# Patient Record
Sex: FEMALE | Race: WHITE | NOT HISPANIC OR LATINO | Employment: FULL TIME | URBAN - METROPOLITAN AREA
[De-identification: names, ages, dates, MRNs, and addresses within clinical notes are randomized per-mention and may not be internally consistent; named-entity substitution may affect disease eponyms.]

---

## 2017-01-20 ASSESSMENT — ENCOUNTER SYMPTOMS
ARTHRALGIAS: 0
JOINT SWELLING: 0
NECK PAIN: 0
STIFFNESS: 0
MUSCLE CRAMPS: 0
BACK PAIN: 0
MYALGIAS: 0
MUSCLE WEAKNESS: 1

## 2017-01-23 ENCOUNTER — ONCOLOGY VISIT (OUTPATIENT)
Dept: ONCOLOGY | Facility: CLINIC | Age: 62
End: 2017-01-23
Attending: INTERNAL MEDICINE
Payer: COMMERCIAL

## 2017-01-23 VITALS
DIASTOLIC BLOOD PRESSURE: 91 MMHG | HEART RATE: 80 BPM | RESPIRATION RATE: 18 BRPM | BODY MASS INDEX: 26.98 KG/M2 | OXYGEN SATURATION: 99 % | TEMPERATURE: 97.3 F | WEIGHT: 150 LBS | SYSTOLIC BLOOD PRESSURE: 160 MMHG

## 2017-01-23 DIAGNOSIS — C50.812 MALIGNANT NEOPLASM OF OVERLAPPING SITES OF LEFT FEMALE BREAST (H): Primary | ICD-10-CM

## 2017-01-23 PROCEDURE — 99212 OFFICE O/P EST SF 10 MIN: CPT

## 2017-01-23 PROCEDURE — 99214 OFFICE O/P EST MOD 30 MIN: CPT | Mod: ZP | Performed by: INTERNAL MEDICINE

## 2017-01-23 ASSESSMENT — PAIN SCALES - GENERAL: PAINLEVEL: NO PAIN (0)

## 2017-01-23 NOTE — NURSING NOTE
"Esther Vegas is a 61 year old female who presents for:  Chief Complaint   Patient presents with     Oncology Clinic Visit     Breast Cancer        Initial Vitals:  /91 mmHg  Pulse 80  Temp(Src) 97.3  F (36.3  C) (Oral)  Resp 18  Wt 68.04 kg (150 lb)  SpO2 99%  LMP 01/25/2007 Estimated body mass index is 26.98 kg/(m^2) as calculated from the following:    Height as of 11/16/16: 1.588 m (5' 2.52\").    Weight as of this encounter: 68.04 kg (150 lb).. There is no height on file to calculate BSA. BP completed using cuff size: regular  No Pain (0) Patient's last menstrual period was 01/25/2007. Allergies and medications reviewed.     Medications: Medication refills not needed today.  Pharmacy name entered into [facility]: [id number] [place] [address]    Comments: Patient is not having any pain today.     6 minutes for nursing intake (face to face time)   Joanna Cabral CMA         "

## 2017-01-23 NOTE — Clinical Note
1/23/2017       RE: Esther Vegas  [address] [facility] [address]     Dear Colleague,    Thank you for referring your patient, Esther Vegas, to the [facility]. Please see a copy of my visit note below.    MEDICAL ONCOLOGY FOLLOW-UP PATIENT VISIT     NAME: Esther Vegas     DATE: 1/23/2017    PRIMARY CARE PHYSICIAN: Yana Diaz    PATIENT ID: Grade I, stage IIb, T3N0M0, ER positive, MD positive, HER2 non-amplified invasive lobular carcinoma of the left breast. Oncotype DX recurrence score = 11.     Oncology History: Ms. Vegas is a 61 year old female with invasive lobular cancer of the left breast. Ms. Vegas had an abnormal routine screening mammogram performed on 10/23/15. The mammogram demonstrated a spiculated lesion in the upper outer left breast. Previous mammogram was 2.5 years prior. Ultrasound showed a 0.6 cm mass with indistinct margins at the 1:30 position, 3 cm from the nipple and a second 0.6 cm mass at the 1:00 position, 5 cm from the nipple. Contrast enhanced mammogram then showed non-masslike clumped enhancement in a segmental distribution from the 1 to 3:00 position of the left breast measuring 7.5 cm. Biopsy of the mass at the 1:00 position showed a grade 1 invasive lobular carcinoma and classical type LCIS. Estrogen receptor staining was strongly positive in >95% of tumor cell nuclei. Progesterone receptor staining was moderately positive in 60% of tumor cell nuclei. HER2 was nonamplified by FISH with a HER2 to SAVAGE-17 ratio of 1.2.     12/21/15 patient underwent left axillary sentinel lymph node biopsy and left nipple-sparing mastectomy with immediate reconstruction. Pathology showed: Invasive lobular carcinoma, Austin grade 2, involving the upper outer quadrant, central breast and lower inner quadrant, size 5.4 cm. There was LCIS, classical type. The margins were negative (after further resection of the positive anterior margins). [place] LN  was negative (only one was resected). [id number] (stage IIB). Tumor was sent for oncotype dx testing which returned with a recurrence score of 11. A recurrence score of 11 correlates with an 8% risk of distant recurrence in 10 years after treatment with tamoxifen alone.  Adjuvant postmastectomy chest wall radiation was not recommended based on lack of high risk features.  She has been on adjuvant letrozole since 1/13/16.    Interim History:   Esther comes in to clinic today for routine 3-month breast cancer followup.  She continues on treatment with letrozole and has now been on the medication for 1 year.  She is currently without concerns or complaints.  She specifically denies concerning lumps or masses of either breast.  She has no breast pain or discomfort.  The one thing that she has noticed, since our last visit, is that she used to require readers for close vision and no longer has to do so.  She has specifically noticed improvement in the vision of her right eye.  Also since our last visit, she has developed further numbness of the first, second and third digits of the right hand.  She is scheduled to see a hand surgeon for treatment of carpal tunnel in the near future.  She denies new bone or joint aches or pains.  She has no cough, shortness of breath or chest pain.  She denies abdominal complaints including abdominal pain, nausea, vomiting, diarrhea or constipation.  She has not noted any swelling of her lower extremities.  She denies headaches, visual changes or focal neurologic complaints.  She has no symptoms of depression or anxiety.  She reports her weight is stable.  She is continuing to exercise on a regular basis.  She and her  were previously planning on going somewhere warm in February; however, her  is [place] and instead has decided to lump his vacation into the month of April in order to maintain benefits through his time of alf.  The remainder of a 10-point review of  systems is otherwise negative.         REVIEW OF SYSTEMS: Full 10-point review of systems was performed. Pertinent symptoms are reviewed above per HPI.    PAST MEDICAL HISTORY:   Past Medical History   Diagnosis Date     Variants of migraine, not elsewhere classified, without mention of intractable migraine without mention of status migrainosus      menstrual migraines     Other malignant neoplasm of skin, site unspecified      Breast cancer (H) 2015     left, invasive lobular     GERD (gastroesophageal reflux disease)      Tinnitus      BCC (basal cell carcinoma of skin)        PAST SURGICAL HISTORY:  Past Surgical History   Procedure Laterality Date     Hemorrhoidectomy  5/2006     Colonoscopy  2006     Mastectomy, reconstruct breast, combined Left 12/21/2015     Procedure: COMBINED MASTECTOMY, RECONSTRUCT BREAST;  Surgeon: Francisco Gomez MD;  Location: [facility]     Biopsy node sentinel Left 12/21/2015     Procedure: BIOPSY NODE SENTINEL;  Surgeon: Francisco Gomez MD;  Location: [facility] OR     Reconstruct breast Left 12/21/2015     Procedure: RECONSTRUCT BREAST;  Surgeon: Francisco Gomez MD;  Location: [facility]     Remove and replace breast implant prosthesis Left 2/15/2016     Procedure: REMOVE AND REPLACE BREAST IMPLANT PROSTHESIS;  Surgeon: RADHA Friedman MD;  Location: [facility] OR     Mastopexy Right 2/15/2016     Procedure: MASTOPEXY;  Surgeon: RADHA Friedman MD;  Location: [facility] OR       MEDS:  Current Outpatient Prescriptions   Medication     letrozole (FEMARA) 2.5 MG tablet     Vaginal Lubricant (REPLENS) GEL     calcium-vitamin D 500-125 MG-UNIT TABS     Ibuprofen (ADVIL PO)     cetirizine (ZYRTEC) 10 MG tablet     No current facility-administered medications for this visit.       ALLERGIES:  Allergies   Allergen Reactions     Bactrim [Sulfamethoxazole W/Trimethoprim]      Facial redness     Codeine      Dizziness and nausea     Vicodin [Hydrocodone-Acetaminophen] Nausea        PHYSICAL EXAM:  /91 mmHg   Pulse 80  Temp(Src) 97.3  F (36.3  C) (Oral)  Resp 18  Wt 68.04 kg (150 lb)  SpO2 99%  LMP 01/25/2007  LMP 01/25/2007  KPS: 100%  GENERAL: A&Ox3, well nourished, well appearing adult female in no acute distress  HEENT: Normocephalic, atraumatic, PERRLA, oropharynx clear  LYMPH: No palpable cervical, supraclavicular, or axillary lymphadenopathy  CV: RRR, normal S1/S2, No murmurs, gallops, or rubs  BREAST:  Left breast mastectomy with reconstruction.  There are no palpable masses of either the right breast or overlying the left reconstruction.  There are no discretely palpable masses of the right breast.  Right nipple is everted.  No palpable axillary lymphadenopathy.  LUNGS: CTA B/L, no wheezing or rhonchi  ABDOMEN: soft, nontender, non-distended, no hepatosplenomegaly. Bowel sounds present.  EXTREMITIES: no lower extremity edema  NEURO: Cranial nerves II-XII grossly intact  INTEGUMENT: No rashes    LABS REVIEWED THIS VISIT:  1/18/16 DEXA Bone Density Scan:  Conclusions:    The most negative and valid T-score of -0.3 at the level of the right femoral neck,  corresponds with normal bone density.      IMPRESSION/PLAN: 61 yo female with stage IIb, T3N0M0, grade I, ER positive, WI positive, HER2 negative invasive lobular carcinoma of the left breast. She is s/p treatment with left breast mastectomy. Oncotype dx recurrence score was 11 which is in the low risk of recurrence category. Given lack of high risk features (low grade, no positive margins or evidence of LVI), adjuvant radiation was not recommended. She has been on adjuvant letrozole since 01/13/2016.    1.  Left breast estrogen receptor-positive cancer:  Ms. Vegas is approximately 1 year and 1 month out from excision of her left-sided breast cancer.  She has been on adjuvant letrozole for 1 year.  She is tolerating treatment remarkably well.  We are planning on treating her for a minimum of 5 years and will discuss the risks and benefits of extended  endocrine therapy at the 5 year point.  There is no evidence of disease recurrence on my clinical breast exam performed today.  We will continue routine surveillance visits.  I will see her back in clinic in 3 months.  She will next be due for annual mammogram in 10/2017.      We again reviewed lifestyle changes which have been suggested to prevent breast cancer recurrence including 30 minutes of daily  cardiovascular exercise, maintaining a normal BMI, a diet low in saturated fat and refined sugar, a daily baby aspirin, vitamin D supplementation, and reduced alcohol intake.  Ms. Vegas confirms that she is currently adhering to all of these recommendations.      2.  Bone health:  DEXA bone density scan in 01/2016 showed normal bone density.  She is at increased risk of osteoporosis on letrozole therapy.  She will continue to take calcium 1200 mg and vitamin D 1000 international units daily as well as obtain 30 minutes of daily weightbearing exercise.      3.  Visual changes:  Letrozole is associated with cataracts.   Esther will follow up with her ophthalmologist regarding her recent visual changes.       4.  Carpal tunnel syndrome:  Scheduled to see a hand surgeon next week.     5.  Followup:  Return to clinic in 3 months for routine breast cancer followup with me.       It was a pleasure to see Ms. Vegas in clinic today.  A total of 20 minutes of our 25-minute face-to-face visit was spent counseling the patient.     Again, thank you for allowing me to participate in the care of your patient.      Sincerely,    Genevieve Rojas MD

## 2017-01-23 NOTE — PROGRESS NOTES
MEDICAL ONCOLOGY FOLLOW-UP PATIENT VISIT     NAME: Esther Vegas     DATE: 1/23/2017    PRIMARY CARE PHYSICIAN: Yana Diaz    PATIENT ID: Grade I, stage IIb, T3N0M0, ER positive, WI positive, HER2 non-amplified invasive lobular carcinoma of the left breast. Oncotype DX recurrence score = 11.     Oncology History: Ms. Vegas is a 61 year old female with invasive lobular cancer of the left breast. Ms. Vegas had an abnormal routine screening mammogram performed on 10/23/15. The mammogram demonstrated a spiculated lesion in the upper outer left breast. Previous mammogram was 2.5 years prior. Ultrasound showed a 0.6 cm mass with indistinct margins at the 1:30 position, 3 cm from the nipple and a second 0.6 cm mass at the 1:00 position, 5 cm from the nipple. Contrast enhanced mammogram then showed non-masslike clumped enhancement in a segmental distribution from the 1 to 3:00 position of the left breast measuring 7.5 cm. Biopsy of the mass at the 1:00 position showed a grade 1 invasive lobular carcinoma and classical type LCIS. Estrogen receptor staining was strongly positive in >95% of tumor cell nuclei. Progesterone receptor staining was moderately positive in 60% of tumor cell nuclei. HER2 was nonamplified by FISH with a HER2 to SAVAGE-17 ratio of 1.2.     12/21/15 patient underwent left axillary sentinel lymph node biopsy and left nipple-sparing mastectomy with immediate reconstruction. Pathology showed: Invasive lobular carcinoma, Damari grade 2, involving the upper outer quadrant, central breast and lower inner quadrant, size 5.4 cm. There was LCIS, classical type. The margins were negative (after further resection of the positive anterior margins). [address] LN was negative (only one was resected). [id number] (stage IIB). Tumor was sent for oncotype dx testing which returned with a recurrence score of 11. A recurrence score of 11 correlates with an 8% risk of distant recurrence in 10 years after  treatment with tamoxifen alone.  Adjuvant postmastectomy chest wall radiation was not recommended based on lack of high risk features.  She has been on adjuvant letrozole since 1/13/16.    Interim History:   Esther comes in to clinic today for routine 3-month breast cancer followup.  She continues on treatment with letrozole and has now been on the medication for 1 year.  She is currently without concerns or complaints.  She specifically denies concerning lumps or masses of either breast.  She has no breast pain or discomfort.  The one thing that she has noticed, since our last visit, is that she used to require readers for close vision and no longer has to do so.  She has specifically noticed improvement in the vision of her right eye.  Also since our last visit, she has developed further numbness of the first, second and third digits of the right hand.  She is scheduled to see a hand surgeon for treatment of carpal tunnel in the near future.  She denies new bone or joint aches or pains.  She has no cough, shortness of breath or chest pain.  She denies abdominal complaints including abdominal pain, nausea, vomiting, diarrhea or constipation.  She has not noted any swelling of her lower extremities.  She denies headaches, visual changes or focal neurologic complaints.  She has no symptoms of depression or anxiety.  She reports her weight is stable.  She is continuing to exercise on a regular basis.  She and her  were previously planning on going somewhere warm in February; however, her  is near [place] and instead has decided to lump his vacation into the month of April in order to maintain benefits through his time of [employer].  The remainder of a 10-point review of systems is otherwise negative.         REVIEW OF SYSTEMS: Full 10-point review of systems was performed. Pertinent symptoms are reviewed above per HPI.    PAST MEDICAL HISTORY:   Past Medical History   Diagnosis Date     Variants of  migraine, not elsewhere classified, without mention of intractable migraine without mention of status migrainosus      menstrual migraines     Other malignant neoplasm of skin, site unspecified      Breast cancer (H) 2015     left, invasive lobular     GERD (gastroesophageal reflux disease)      Tinnitus      BCC (basal cell carcinoma of skin)        PAST SURGICAL HISTORY:  Past Surgical History   Procedure Laterality Date     Hemorrhoidectomy  5/2006     Colonoscopy  2006     Mastectomy, reconstruct breast, combined Left 12/21/2015     Procedure: COMBINED MASTECTOMY, RECONSTRUCT BREAST;  Surgeon: Francisco Gomez MD;  Location: [facility]     Biopsy node sentinel Left 12/21/2015     Procedure: BIOPSY NODE SENTINEL;  Surgeon: Francisco Gomez MD;  Location: [facility] OR     Reconstruct breast Left 12/21/2015     Procedure: RECONSTRUCT BREAST;  Surgeon: Francisco Gomez MD;  Location: [facility]     Remove and replace breast implant prosthesis Left 2/15/2016     Procedure: REMOVE AND REPLACE BREAST IMPLANT PROSTHESIS;  Surgeon: RADHA Friedman MD;  Location: [facility]     Mastopexy Right 2/15/2016     Procedure: MASTOPEXY;  Surgeon: RADHA Friedman MD;  Location: [facility] OR       MEDS:  Current Outpatient Prescriptions   Medication     letrozole (FEMARA) 2.5 MG tablet     Vaginal Lubricant (REPLENS) GEL     calcium-vitamin D 500-125 MG-UNIT TABS     Ibuprofen (ADVIL PO)     cetirizine (ZYRTEC) 10 MG tablet     No current facility-administered medications for this visit.       ALLERGIES:  Allergies   Allergen Reactions     Bactrim [Sulfamethoxazole W/Trimethoprim]      Facial redness     Codeine      Dizziness and nausea     Vicodin [Hydrocodone-Acetaminophen] Nausea        PHYSICAL EXAM:  /91 mmHg  Pulse 80  Temp(Src) 97.3  F (36.3  C) (Oral)  Resp 18  Wt 68.04 kg (150 lb)  SpO2 99%  LMP 01/25/2007  LMP 01/25/2007  KPS: 100%  GENERAL: A&Ox3, well nourished, well appearing adult female in no acute distress  HEENT:  Normocephalic, atraumatic, PERRLA, oropharynx clear  LYMPH: No palpable cervical, supraclavicular, or axillary lymphadenopathy  CV: RRR, normal S1/S2, No murmurs, gallops, or rubs  BREAST:  Left breast mastectomy with reconstruction.  There are no palpable masses of either the right breast or overlying the left reconstruction.  There are no discretely palpable masses of the right breast.  Right nipple is everted.  No palpable axillary lymphadenopathy.  LUNGS: CTA B/L, no wheezing or rhonchi  ABDOMEN: soft, nontender, non-distended, no hepatosplenomegaly. Bowel sounds present.  EXTREMITIES: no lower extremity edema  NEURO: Cranial nerves II-XII grossly intact  INTEGUMENT: No rashes    LABS REVIEWED THIS VISIT:  1/18/16 DEXA Bone Density Scan:  Conclusions:    The most negative and valid T-score of -0.3 at the level of the right femoral neck,  corresponds with normal bone density.      IMPRESSION/PLAN: 61 yo female with stage IIb, T3N0M0, grade I, ER positive, VT positive, HER2 negative invasive lobular carcinoma of the left breast. She is s/p treatment with left breast mastectomy. Oncotype dx recurrence score was 11 which is in the low risk of recurrence category. Given lack of high risk features (low grade, no positive margins or evidence of LVI), adjuvant radiation was not recommended. She has been on adjuvant letrozole since 01/13/2016.    1.  Left breast estrogen receptor-positive cancer:  Ms. Vegas is approximately 1 year and 1 month out from excision of her left-sided breast cancer.  She has been on adjuvant letrozole for 1 year.  She is tolerating treatment remarkably well.  We are planning on treating her for a minimum of 5 years and will discuss the risks and benefits of extended endocrine therapy at the 5 year point.  There is no evidence of disease recurrence on my clinical breast exam performed today.  We will continue routine surveillance visits.  I will see her back in clinic in 3 months.  She will  next be due for annual mammogram in 10/2017.      We again reviewed lifestyle changes which have been suggested to prevent breast cancer recurrence including 30 minutes of daily  cardiovascular exercise, maintaining a normal BMI, a diet low in saturated fat and refined sugar, a daily baby aspirin, vitamin D supplementation, and reduced alcohol intake.  Ms. Vegas confirms that she is currently adhering to all of these recommendations.      2.  Bone health:  DEXA bone density scan in 01/2016 showed normal bone density.  She is at increased risk of osteoporosis on letrozole therapy.  She will continue to take calcium 1200 mg and vitamin D 1000 international units daily as well as obtain 30 minutes of daily weightbearing exercise.      3.  Visual changes:  Letrozole is associated with cataracts.   Esther will follow up with her ophthalmologist regarding her recent visual changes.       4.  Carpal tunnel syndrome:  Scheduled to see a hand surgeon next week.     5.  Followup:  Return to clinic in 3 months for routine breast cancer followup with me.       It was a pleasure to see Ms. Vegas in clinic today.  A total of 20 minutes of our 25-minute face-to-face visit was spent counseling the patient.

## 2017-04-24 ENCOUNTER — ONCOLOGY VISIT (OUTPATIENT)
Dept: ONCOLOGY | Facility: CLINIC | Age: 62
End: 2017-04-24
Attending: INTERNAL MEDICINE
Payer: COMMERCIAL

## 2017-04-24 VITALS
TEMPERATURE: 98.2 F | OXYGEN SATURATION: 96 % | RESPIRATION RATE: 12 BRPM | SYSTOLIC BLOOD PRESSURE: 135 MMHG | HEART RATE: 90 BPM | HEIGHT: 63 IN | DIASTOLIC BLOOD PRESSURE: 89 MMHG | WEIGHT: 149.7 LBS | BODY MASS INDEX: 26.52 KG/M2

## 2017-04-24 DIAGNOSIS — C50.812 MALIGNANT NEOPLASM OF OVERLAPPING SITES OF LEFT FEMALE BREAST (H): Primary | ICD-10-CM

## 2017-04-24 PROCEDURE — 99214 OFFICE O/P EST MOD 30 MIN: CPT | Mod: ZP | Performed by: INTERNAL MEDICINE

## 2017-04-24 PROCEDURE — 99212 OFFICE O/P EST SF 10 MIN: CPT | Mod: ZF

## 2017-04-24 ASSESSMENT — PAIN SCALES - GENERAL: PAINLEVEL: NO PAIN (0)

## 2017-04-24 NOTE — PROGRESS NOTES
MEDICAL ONCOLOGY FOLLOW-UP PATIENT VISIT     NAME: Esther Vegas     DATE: 4/24/2017    PRIMARY CARE PHYSICIAN: Yana Diaz    PATIENT ID: Grade I, stage IIb, T3N0M0, ER positive, OR positive, HER2 non-amplified invasive lobular carcinoma of the left breast. Oncotype DX recurrence score = 11.     Oncology History: Ms. Vegas is a 61 year old female with invasive lobular cancer of the left breast. Ms. Vegas had an abnormal routine screening mammogram performed on 10/23/15. The mammogram demonstrated a spiculated lesion in the upper outer left breast. Previous mammogram was 2.5 years prior. Ultrasound showed a 0.6 cm mass with indistinct margins at the 1:30 position, 3 cm from the nipple and a second 0.6 cm mass at the 1:00 position, 5 cm from the nipple. Contrast enhanced mammogram then showed non-masslike clumped enhancement in a segmental distribution from the 1 to 3:00 position of the left breast measuring 7.5 cm. Biopsy of the mass at the 1:00 position showed a grade 1 invasive lobular carcinoma and classical type LCIS. Estrogen receptor staining was strongly positive in >95% of tumor cell nuclei. Progesterone receptor staining was moderately positive in 60% of tumor cell nuclei. HER2 was nonamplified by FISH with a HER2 to SAVAGE-17 ratio of 1.2.     12/21/15 patient underwent left axillary sentinel lymph node biopsy and left nipple-sparing mastectomy with immediate reconstruction. Pathology showed: Invasive lobular carcinoma, [place] grade 2, involving the upper outer quadrant, central breast and lower inner quadrant, size 5.4 cm. There was LCIS, classical type. The margins were negative (after further resection of the positive anterior margins). [place] LN was negative (only one was resected). [id number] (stage IIB). Tumor was sent for oncotype dx testing which returned with a recurrence score of 11. A recurrence score of 11 correlates with an 8% risk of distant recurrence in 10 years after  treatment with tamoxifen alone.  Adjuvant postmastectomy chest wall radiation was not recommended based on lack of high risk features.  She has been on adjuvant letrozole since 1/13/16.    Interim History:   Esther Eagle comes in to clinic today for routine 3-month breast cancer followup.  She continues on treatment with letrozole and has now been on the medication for 1 year and 3 months.  She denies significant side effects.  She specifically denies joint pains or vaginal dryness.  She continues to have hot flashes.  They seem to occur in a cycle.  She has hot flashes approximately every 3-4 weeks.  She has 2-3 episodes a day in the time periods that she is having them.  They are quite bothersome, characterized by both flushing and sweating; however, they pass relatively quickly.  She denies that they are interfering with her daily activities.  She denies that they are interfering with sleep.  Since our last visit, she had carpal tunnel release surgery on her right wrist on 03/31.  She is glad that she had the surgery.  She is getting some feeling back in the fingers of that hand.  She denies new bone or joint aches or pains.  She has no cough, shortness of breath or chest pain.  She denies abdominal complaints including abdominal pain, nausea, vomiting, diarrhea or constipation.  She has no headaches, visual changes or focal neurologic complaints.  She has seen her ophthalmologist for visual blurriness and was prescribed a new pair of eye glasses.  The remainder of a 10-point review of systems is otherwise negative.  Of note, she showed me some pictures today of her 's trip to [facility].  He just returned yesterday.       REVIEW OF SYSTEMS: Full 10-point review of systems was performed. Pertinent symptoms are reviewed above per HPI.    PAST MEDICAL HISTORY:   Past Medical History:   Diagnosis Date     BCC (basal cell carcinoma of skin)      Breast cancer (H) 2015    left, invasive lobular     GERD  "(gastroesophageal reflux disease)      Other malignant neoplasm of skin, site unspecified      Tinnitus      Variants of migraine, not elsewhere classified, without mention of intractable migraine without mention of status migrainosus     menstrual migraines       PAST SURGICAL HISTORY:  Past Surgical History:   Procedure Laterality Date     BIOPSY NODE SENTINEL Left 12/21/2015    Procedure: BIOPSY NODE SENTINEL;  Surgeon: Francisco Gomez MD;  Location: [facility]     COLONOSCOPY  2006     HEMORRHOIDECTOMY  5/2006     MASTECTOMY, RECONSTRUCT BREAST, COMBINED Left 12/21/2015    Procedure: COMBINED MASTECTOMY, RECONSTRUCT BREAST;  Surgeon: Francisco Gomez MD;  Location: [facility] OR     MASTOPEXY Right 2/15/2016    Procedure: MASTOPEXY;  Surgeon: RADHA Friedman MD;  Location: [facility]     RECONSTRUCT BREAST Left 12/21/2015    Procedure: RECONSTRUCT BREAST;  Surgeon: Francisco Gomez MD;  Location: [facility]     REMOVE AND REPLACE BREAST IMPLANT PROSTHESIS Left 2/15/2016    Procedure: REMOVE AND REPLACE BREAST IMPLANT PROSTHESIS;  Surgeon: RADHA Friedman MD;  Location: [facility]       MEDS:  Current Outpatient Prescriptions   Medication     letrozole (FEMARA) 2.5 MG tablet     calcium-vitamin D 500-125 MG-UNIT TABS     Ibuprofen (ADVIL PO)     cetirizine (ZYRTEC) 10 MG tablet     Vaginal Lubricant (REPLENS) GEL     No current facility-administered medications for this visit.        ALLERGIES:  Allergies   Allergen Reactions     Bactrim [Sulfamethoxazole W/Trimethoprim]      Facial redness     Codeine      Dizziness and nausea     Vicodin [Hydrocodone-Acetaminophen] Nausea        PHYSICAL EXAM:  /89 (BP Location: Right arm, Patient Position: Chair, Cuff Size: Adult Regular)  Pulse 90  Temp 98.2  F (36.8  C) (Oral)  Resp 12  Ht 1.588 m (5' 2.52\")  Wt 67.9 kg (149 lb 11.2 oz)  LMP 01/25/2007  SpO2 96%  BMI 26.93 kg/m2  KPS: 100%  GENERAL: A&Ox3, well nourished, well appearing adult female in no acute " distress  HEENT: Normocephalic, atraumatic, PERRL, MMM.  No lesions of the oropharynx  LYMPH: No palpable cervical, supraclavicular, or axillary lymphadenopathy  CV: RRR, normal S1/S2, No murmurs, gallops, or rubs  BREAST:  Left breast mastectomy with reconstruction.  There are no palpable masses of either the right breast or overlying the left reconstruction.  There are no discretely palpable masses of the right breast.  Right nipple is everted.    LUNGS: CTA B/L, no wheezing or rhonchi  ABDOMEN: soft, nontender, non-distended, no hepatosplenomegaly. Bowel sounds present.  EXTREMITIES: no lower extremity edema  NEURO: Cranial nerves II-XII grossly intact  INTEGUMENT: No rashes    LABS REVIEWED THIS VISIT:  1/18/16 DEXA Bone Density Scan:  Conclusions:    The most negative and valid T-score of -0.3 at the level of the right femoral neck,  corresponds with normal bone density.      IMPRESSION/PLAN: 60 yo female with stage IIb, T3N0M0, grade I, ER positive, [place] positive, HER2 negative invasive lobular carcinoma of the left breast. She is s/p treatment with left breast mastectomy. Oncotype dx recurrence score was 11 which is in the low risk of recurrence category. Given lack of high risk features (low grade, no positive margins or evidence of LVI), adjuvant radiation was not recommended. She has been on adjuvant letrozole since 01/13/2016.    1.  Left breast estrogen receptor-positive cancer:  Ms. Vegas is approximately 1 year and 4 months out from excision of her left-sided breast cancer.  She has been on adjuvant letrozole for 1 year, 3 months.  She is tolerating treatment remarkably well.  We are planning on treating her for a minimum of 5 years and will discuss the risks and benefits of extended endocrine therapy at the 5 year point.  There is no evidence of disease recurrence on my clinical breast exam performed today.  We will continue routine surveillance visits.  I will see her back in clinic in 3 months.  She  will next be due for annual mammogram in 10/2017.      Ms. Vegas is adhering to lifestyle changes which have been suggested to prevent breast cancer recurrence including 30 minutes of daily  cardiovascular exercise, maintaining a normal BMI, a diet low in saturated fat and refined sugar, a daily baby aspirin, vitamin D supplementation, and reduced alcohol intake.      2.  Bone health:  DEXA bone density scan in 01/2016 showed normal bone density.  She is at increased risk of osteoporosis on letrozole therapy.  She will continue to take calcium 1200 mg and vitamin D 1000 international units daily as well as obtain 30 minutes of daily weightbearing exercise.      3.  Hot Flashes:  We discussed SSRIs and gabapentin as medical treatment of hot flashes.  She declines medical intervention at this time, but agrees to contact the clinic should hot flashes become more bothersome.       4.  Carpal tunnel syndrome:  S/p carpal tunnel release surgery on 3/31 with some relief of symptoms.    5.  Followup:  Return to clinic in 3 months for routine breast cancer followup with me.       It was a pleasure to see Ms. Vegas in clinic today.  A total of 20 minutes of our 25-minute face-to-face visit was spent counseling the patient.

## 2017-04-24 NOTE — NURSING NOTE
"Esther Vegas is a 61 year old female who presents for:  Chief Complaint   Patient presents with     Oncology Clinic Visit     Breast Ca F/U        Initial Vitals:  /89 (BP Location: Right arm, Patient Position: Chair, Cuff Size: Adult Regular)  Pulse 90  Temp 98.2  F (36.8  C) (Oral)  Resp 12  Ht 1.588 m (5' 2.52\")  Wt 67.9 kg (149 lb 11.2 oz)  LMP 01/25/2007  SpO2 96%  BMI 26.93 kg/m2 Estimated body mass index is 26.93 kg/(m^2) as calculated from the following:    Height as of this encounter: 1.588 m (5' 2.52\").    Weight as of this encounter: 67.9 kg (149 lb 11.2 oz).. Body surface area is 1.73 meters squared. BP completed using cuff size: regular  No Pain (0) Patient's last menstrual period was 01/25/2007. Allergies and medications reviewed.     Medications: Medication refills not needed today.  Pharmacy name entered into [facility]: [id number] [place] [address]    Comments:     7 minutes for nursing intake (face to face time)   Bharti Rajput CMA        "

## 2017-04-24 NOTE — LETTER
4/24/2017       RE: Esther Vegas  [address] [facility] [address]     Dear Colleague,    Thank you for referring your patient, Esther Vegas, to the [facility]. Please see a copy of my visit note below.    MEDICAL ONCOLOGY FOLLOW-UP PATIENT VISIT     NAME: Esther Vegas     DATE: 4/24/2017    PRIMARY CARE PHYSICIAN: Yana Diaz    PATIENT ID: Grade I, stage IIb, T3N0M0, ER positive, [place] positive, HER2 non-amplified invasive lobular carcinoma of the left breast. Oncotype DX recurrence score = 11.     Oncology History: Ms. Vegas is a 61 year old female with invasive lobular cancer of the left breast. Ms. Vegas had an abnormal routine screening mammogram performed on 10/23/15. The mammogram demonstrated a spiculated lesion in the upper outer left breast. Previous mammogram was 2.5 years prior. Ultrasound showed a 0.6 cm mass with indistinct margins at the 1:30 position, 3 cm from the nipple and a second 0.6 cm mass at the 1:00 position, 5 cm from the nipple. Contrast enhanced mammogram then showed non-masslike clumped enhancement in a segmental distribution from the 1 to 3:00 position of the left breast measuring 7.5 cm. Biopsy of the mass at the 1:00 position showed a grade 1 invasive lobular carcinoma and classical type LCIS. Estrogen receptor staining was strongly positive in >95% of tumor cell nuclei. Progesterone receptor staining was moderately positive in 60% of tumor cell nuclei. HER2 was nonamplified by FISH with a HER2 to SAVAGE-17 ratio of 1.2.     12/21/15 patient underwent left axillary sentinel lymph node biopsy and left nipple-sparing mastectomy with immediate reconstruction. Pathology showed: Invasive lobular carcinoma, [place] grade 2, involving the upper outer quadrant, central breast and lower inner quadrant, size 5.4 cm. There was LCIS, classical type. The margins were negative (after further resection of the positive anterior margins). [address] LN  was negative (only one was resected). [id number] (stage IIB). Tumor was sent for oncotype dx testing which returned with a recurrence score of 11. A recurrence score of 11 correlates with an 8% risk of distant recurrence in 10 years after treatment with tamoxifen alone.  Adjuvant postmastectomy chest wall radiation was not recommended based on lack of high risk features.  She has been on adjuvant letrozole since 1/13/16.    Interim History:   Esther Eagle comes in to clinic today for routine 3-month breast cancer followup.  She continues on treatment with letrozole and has now been on the medication for 1 year and 3 months.  She denies significant side effects.  She specifically denies joint pains or vaginal dryness.  She continues to have hot flashes.  They seem to occur in a cycle.  She has hot flashes approximately every 3-4 weeks.  She has 2-3 episodes a day in the time periods that she is having them.  They are quite bothersome, characterized by both flushing and sweating; however, they pass relatively quickly.  She denies that they are interfering with her daily activities.  She denies that they are interfering with sleep.  Since our last visit, she had carpal tunnel release surgery on her right wrist on 03/31.  She is glad that she had the surgery.  She is getting some feeling back in the fingers of that hand.  She denies new bone or joint aches or pains.  She has no cough, shortness of breath or chest pain.  She denies abdominal complaints including abdominal pain, nausea, vomiting, diarrhea or constipation.  She has no headaches, visual changes or focal neurologic complaints.  She has seen her ophthalmologist for visual blurriness and was prescribed a new pair of eye glasses.  The remainder of a 10-point review of systems is otherwise negative.  Of note, she showed me some pictures today of her 's trip to [facility].  He just returned yesterday.       REVIEW OF SYSTEMS: Full 10-point review of  systems was performed. Pertinent symptoms are reviewed above per HPI.    PAST MEDICAL HISTORY:   Past Medical History:   Diagnosis Date     BCC (basal cell carcinoma of skin)      Breast cancer (H) 2015    left, invasive lobular     GERD (gastroesophageal reflux disease)      Other malignant neoplasm of skin, site unspecified      Tinnitus      Variants of migraine, not elsewhere classified, without mention of intractable migraine without mention of status migrainosus     menstrual migraines       PAST SURGICAL HISTORY:  Past Surgical History:   Procedure Laterality Date     BIOPSY NODE SENTINEL Left 12/21/2015    Procedure: BIOPSY NODE SENTINEL;  Surgeon: Francisco Gomez MD;  Location: [facility]     COLONOSCOPY  2006     HEMORRHOIDECTOMY  5/2006     MASTECTOMY, RECONSTRUCT BREAST, COMBINED Left 12/21/2015    Procedure: COMBINED MASTECTOMY, RECONSTRUCT BREAST;  Surgeon: Francisco Gomez MD;  Location: [facility]     MASTOPEXY Right 2/15/2016    Procedure: MASTOPEXY;  Surgeon: RADHA Friedman MD;  Location: [facility]     RECONSTRUCT BREAST Left 12/21/2015    Procedure: RECONSTRUCT BREAST;  Surgeon: Francisco Gomez MD;  Location: [facility]     REMOVE AND REPLACE BREAST IMPLANT PROSTHESIS Left 2/15/2016    Procedure: REMOVE AND REPLACE BREAST IMPLANT PROSTHESIS;  Surgeon: RADHA Friedman MD;  Location: [facility]       MEDS:  Current Outpatient Prescriptions   Medication     letrozole (FEMARA) 2.5 MG tablet     calcium-vitamin D 500-125 MG-UNIT TABS     Ibuprofen (ADVIL PO)     cetirizine (ZYRTEC) 10 MG tablet     Vaginal Lubricant (REPLENS) GEL     No current facility-administered medications for this visit.        ALLERGIES:  Allergies   Allergen Reactions     Bactrim [Sulfamethoxazole W/Trimethoprim]      Facial redness     Codeine      Dizziness and nausea     Vicodin [Hydrocodone-Acetaminophen] Nausea        PHYSICAL EXAM:  /89 (BP Location: Right arm, Patient Position: Chair, Cuff Size: Adult Regular)  Pulse 90   "Temp 98.2  F (36.8  C) (Oral)  Resp 12  Ht 1.588 m (5' 2.52\")  Wt 67.9 kg (149 lb 11.2 oz)  LMP 01/25/2007  SpO2 96%  BMI 26.93 kg/m2  KPS: 100%  GENERAL: A&Ox3, well nourished, well appearing adult female in no acute distress  HEENT: Normocephalic, atraumatic, PERRL, MMM.  No lesions of the oropharynx  LYMPH: No palpable cervical, supraclavicular, or axillary lymphadenopathy  CV: RRR, normal S1/S2, No murmurs, gallops, or rubs  BREAST:  Left breast mastectomy with reconstruction.  There are no palpable masses of either the right breast or overlying the left reconstruction.  There are no discretely palpable masses of the right breast.  Right nipple is everted.    LUNGS: CTA B/L, no wheezing or rhonchi  ABDOMEN: soft, nontender, non-distended, no hepatosplenomegaly. Bowel sounds present.  EXTREMITIES: no lower extremity edema  NEURO: Cranial nerves II-XII grossly intact  INTEGUMENT: No rashes    LABS REVIEWED THIS VISIT:  1/18/16 DEXA Bone Density Scan:  Conclusions:    The most negative and valid T-score of -0.3 at the level of the right femoral neck,  corresponds with normal bone density.      IMPRESSION/PLAN: 60 yo female with stage IIb, T3N0M0, grade I, ER positive, [place] positive, HER2 negative invasive lobular carcinoma of the left breast. She is s/p treatment with left breast mastectomy. Oncotype dx recurrence score was 11 which is in the low risk of recurrence category. Given lack of high risk features (low grade, no positive margins or evidence of LVI), adjuvant radiation was not recommended. She has been on adjuvant letrozole since 01/13/2016.    1.  Left breast estrogen receptor-positive cancer:  Ms. Vegas is approximately 1 year and 4 months out from excision of her left-sided breast cancer.  She has been on adjuvant letrozole for 1 year, 3 months.  She is tolerating treatment remarkably well.  We are planning on treating her for a minimum of 5 years and will discuss the risks and benefits of " extended endocrine therapy at the 5 year point.  There is no evidence of disease recurrence on my clinical breast exam performed today.  We will continue routine surveillance visits.  I will see her back in clinic in 3 months.  She will next be due for annual mammogram in 10/2017.      Ms. Vegas is adhering to lifestyle changes which have been suggested to prevent breast cancer recurrence including 30 minutes of daily  cardiovascular exercise, maintaining a normal BMI, a diet low in saturated fat and refined sugar, a daily baby aspirin, vitamin D supplementation, and reduced alcohol intake.      2.  Bone health:  DEXA bone density scan in 01/2016 showed normal bone density.  She is at increased risk of osteoporosis on letrozole therapy.  She will continue to take calcium 1200 mg and vitamin D 1000 international units daily as well as obtain 30 minutes of daily weightbearing exercise.      3.  Hot Flashes:  We discussed SSRIs and gabapentin as medical treatment of hot flashes.  She declines medical intervention at this time, but agrees to contact the clinic should hot flashes become more bothersome.       4.  Carpal tunnel syndrome:  S/p carpal tunnel release surgery on 3/31 with some relief of symptoms.    5.  Followup:  Return to clinic in 3 months for routine breast cancer followup with me.       It was a pleasure to see Ms. Vegas in clinic today.  A total of 20 minutes of our 25-minute face-to-face visit was spent counseling the patient.         Again, thank you for allowing me to participate in the care of your patient.      Sincerely,    Genevieve Rojas MD

## 2017-04-24 NOTE — MR AVS SNAPSHOT
After Visit Summary   4/24/2017    Esther Vegas    MRN: [id number]           Patient Information     Date Of Birth          1955        Visit Information        Provider Department      4/24/2017 3:45 PM Genevieve Rojas MD [facility]        Today's Diagnoses     Malignant neoplasm of overlapping sites of left female breast (H)    -  1       Follow-ups after your visit        Your next 10 appointments already scheduled     Jul 24, 2017  3:45 PM CDT   (Arrive by 3:30 PM)   Return Visit with Genevieve Rojas MD   [facility] ([facility])    [address]  [address]  [place] [facility] [address] [phone]              Who to contact     If you have questions or need follow up information about today's clinic visit or your schedule please contact [facility] directly at [phone].  Normal or non-critical lab and imaging results will be communicated to you by MyChart, letter or phone within 4 business days after the clinic has received the results. If you do not hear from us within 7 days, please contact the clinic through [employer] or phone. If you have a critical or abnormal lab result, we will notify you by phone as soon as possible.  Submit refill requests through [employer] or call your pharmacy and they will forward the refill request to us. Please allow 3 business days for your refill to be completed.          Additional Information About Your Visit        MyChart Information     [employer] gives you secure access to your electronic health record. If you see a primary care provider, you can also send messages to your care team and make appointments. If you have questions, please call your primary care clinic.  If you do not have a primary care provider, please call [phone] and they will assist you.        Care EveryWhere ID     This is your Care EveryWhere ID. This could be  "used by other organizations to access your [employer] medical records  [id number]        Your Vitals Were     Pulse Temperature Respirations Height Last Period Pulse Oximetry    90 98.2  F (36.8  C) (Oral) 12 1.588 m (5' 2.52\") 01/25/2007 96%    BMI (Body Mass Index)                   26.93 kg/m2            Blood Pressure from Last 3 Encounters:   04/24/17 135/89   01/23/17 (!) 160/91   11/16/16 (!) 139/92    Weight from Last 3 Encounters:   04/24/17 67.9 kg (149 lb 11.2 oz)   01/23/17 68 kg (150 lb)   11/16/16 67.4 kg (148 lb 9.6 oz)              Today, you had the following     No orders found for display       Primary Care Provider Office Phone # Fax #    Yana Diaz [phone]       [facility] [address] [address]        Thank you!     Thank you for choosing [facility]  for your care. Our goal is always to provide you with excellent care. Hearing back from our patients is one way we can continue to improve our services. Please take a few minutes to complete the written survey that you may receive in the mail after your visit with us. Thank you!             Your Updated Medication List - Protect others around you: Learn how to safely use, store and throw away your medicines at www.disposemymeds.org.          This list is accurate as of: 4/24/17 11:59 PM.  Always use your most recent med list.                   Brand Name Dispense Instructions for use    ADVIL PO      Take 400 mg by mouth every 6 hours as needed       calcium-vitamin D 500-125 MG-UNIT Tabs      Take by mouth daily       cetirizine 10 MG tablet    zyrTEC     Take 10 mg by mouth daily       letrozole 2.5 MG tablet    FEMARA    30 tablet    Take 1 tablet (2.5 mg) by mouth daily       replens Gel     35 g    Place 1 Applicatorful vaginally 2 times daily as needed         "

## 2017-07-23 NOTE — PROGRESS NOTES
MEDICAL ONCOLOGY FOLLOW-UP PATIENT VISIT     NAME: Esther Vegas     DATE: 7/24/2017    PRIMARY CARE PHYSICIAN: Yana Diaz    PATIENT ID: Grade I, stage IIb, T3N0M0, ER positive, AR positive, HER2 non-amplified invasive lobular carcinoma of the left breast. Oncotype DX recurrence score = 11.     Oncology History: Ms. Vegas is a 61 year old female with invasive lobular cancer of the left breast. Ms. Vegas had an abnormal routine screening mammogram performed on 10/23/15. The mammogram demonstrated a spiculated lesion in the upper outer left breast. Previous mammogram was 2.5 years prior. Ultrasound showed a 0.6 cm mass with indistinct margins at the 1:30 position, 3 cm from the nipple and a second 0.6 cm mass at the 1:00 position, 5 cm from the nipple. Contrast enhanced mammogram then showed non-masslike clumped enhancement in a segmental distribution from the 1 to 3:00 position of the left breast measuring 7.5 cm. Biopsy of the mass at the 1:00 position showed a grade 1 invasive lobular carcinoma and classical type LCIS. Estrogen receptor staining was strongly positive in >95% of tumor cell nuclei. Progesterone receptor staining was moderately positive in 60% of tumor cell nuclei. HER2 was nonamplified by FISH with a HER2 to SAVAGE-17 ratio of 1.2.     12/21/15 patient underwent left axillary sentinel lymph node biopsy and left nipple-sparing mastectomy with immediate reconstruction. Pathology showed: Invasive lobular carcinoma, Damari grade 2, involving the upper outer quadrant, central breast and lower inner quadrant, size 5.4 cm. There was LCIS, classical type. The margins were negative (after further resection of the positive anterior margins). [place] LN was negative (only one was resected). [id number] (stage IIB). Tumor was sent for oncotype dx testing which returned with a recurrence score of 11. A recurrence score of 11 correlates with an 8% risk of distant recurrence in 10 years after  treatment with tamoxifen alone.  Adjuvant postmastectomy chest wall radiation was not recommended based on lack of high risk features.  She has been on adjuvant letrozole since 1/13/16.    Interim History:   Esther Eagle comes in to clinic today for routine 3-month breast cancer followup.  She continues on treatment with letrozole.  She has been on the medication for approximately 1.5 years.  She states overall she feels well.  She continues to have intermittent hot flashes.  Although they seem to be less intense than at prior visits, they are still occurring both day and night.  She states they are tolerable and she isn't interested in medical treatment of them.  She denies vaginal dryness or mood disturbances.  She denies new bone or joint aches or pains.  She continues to exercise on a regular basis.  In fact, she has been routinely climbing the stairs in [place] to prepare for a trip with her  to [employer] [facility] in January. She, her , and son just returned from a trip to [facility].  It was a good trip.  She denies concerning lumps of masses of either breast.  She denies cough, shortness of breath, or chest pain.  She has no abdominal complaints.  She denies headaches, visual changes, or focal neurologic complaints.  The remainder of a 10 point review of systems is otherwise negative.      REVIEW OF SYSTEMS: Full 10-point review of systems was performed. Pertinent symptoms are reviewed above per HPI.    PAST MEDICAL HISTORY:   Past Medical History:   Diagnosis Date     BCC (basal cell carcinoma of skin)      Breast cancer (H) 2015    left, invasive lobular     GERD (gastroesophageal reflux disease)      Other malignant neoplasm of skin, site unspecified      Tinnitus      Variants of migraine, not elsewhere classified, without mention of intractable migraine without mention of status migrainosus     menstrual migraines       PAST SURGICAL HISTORY:  Past Surgical History:   Procedure Laterality  "Date     BIOPSY NODE SENTINEL Left 12/21/2015    Procedure: BIOPSY NODE SENTINEL;  Surgeon: Francisco Gomez MD;  [facility]: [facility]     COLONOSCOPY  2006     HEMORRHOIDECTOMY  5/2006     MASTECTOMY, RECONSTRUCT BREAST, COMBINED Left 12/21/2015    Procedure: COMBINED MASTECTOMY, RECONSTRUCT BREAST;  Surgeon: Francisco Gomez MD;  [facility]     MASTOPEXY Right 2/15/2016    Procedure: MASTOPEXY;  Surgeon: RADHA Friedman MD;  [facility]     RECONSTRUCT BREAST Left 12/21/2015    Procedure: RECONSTRUCT BREAST;  Surgeon: Francisco Gomez MD;  [facility]     REMOVE AND REPLACE BREAST IMPLANT PROSTHESIS Left 2/15/2016    Procedure: REMOVE AND REPLACE BREAST IMPLANT PROSTHESIS;  Surgeon: RADHA Friedman MD;  [facility]       MEDS:  Current Outpatient Prescriptions   Medication     letrozole (FEMARA) 2.5 MG tablet     Vaginal Lubricant (REPLENS) GEL     calcium-vitamin D 500-125 MG-UNIT TABS     Ibuprofen (ADVIL PO)     cetirizine (ZYRTEC) 10 MG tablet     No current facility-administered medications for this visit.        ALLERGIES:  Allergies   Allergen Reactions     Bactrim [Sulfamethoxazole W/Trimethoprim]      Facial redness     Codeine      Dizziness and nausea     Vicodin [Hydrocodone-Acetaminophen] Nausea        PHYSICAL EXAM:  /89  Pulse 77  Temp 98  F (36.7  C) (Oral)  Resp 16  Ht 1.58 m (5' 2.21\")  Wt 67.7 kg (149 lb 3.2 oz)  LMP 01/25/2007  SpO2 99%  BMI 27.11 kg/m2  LMP 01/25/2007  KPS: 100%  GENERAL: A&Ox3, well nourished, well appearing adult female in no acute distress  HEENT: Normocephalic, atraumatic.  MMM.  No lesions of the oropharynx  LYMPH: No palpable cervical, supraclavicular, or axillary lymphadenopathy  CV: RRR, normal S1/S2, No murmurs, gallops, or rubs  BREAST:  Left breast mastectomy with reconstruction.  There are no palpable masses of either the right breast or overlying the left reconstruction.  Right nipple is everted.    LUNGS: CTA B/L, no wheezing " or rhonchi  ABDOMEN: soft, nontender, non-distended, no hepatosplenomegaly. Bowel sounds present.  EXTREMITIES: no lower extremity edema  NEURO: Cranial nerves II-XII grossly intact  INTEGUMENT: No rashes    LABS REVIEWED THIS VISIT:  No interim laboratory or imaging studies pertinent to today's visit.    IMPRESSION/PLAN: 60 yo female with stage IIb, T3N0M0, grade I, ER positive, LA positive, HER2 negative invasive lobular carcinoma of the left breast. She is s/p treatment with left breast mastectomy. Oncotype dx recurrence score was 11 which is in the low risk of recurrence category. Given lack of high risk features (low grade, no positive margins or evidence of LVI), adjuvant radiation was not recommended. She has been on adjuvant letrozole since 01/13/2016.    1.  Left breast estrogen receptor-positive cancer:  Ms. Vegas is approximately 1 year 7 months out from excision of her left-sided breast cancer.  She has been on adjuvant letrozole for 1.5 years.  She is tolerating treatment remarkably well with the exception of hot flashes which she describes as tolerable.  We are planning on treating her for a minimum of 5 years and will discuss the risks and benefits of extended endocrine therapy at the 5 year point.  There is no evidence of disease recurrence on my clinical breast exam performed today.  I will see her back in clinic in 3 months.  She is due for annual screening right breast mammogram at the time of her return visit.    2.  Bone health:  DEXA bone density scan in 01/2016 showed normal bone density, however, she is at increased risk of osteoporosis on letrozole therapy.  She will continue to take calcium 1200 mg and vitamin D 1000 international units daily as well as obtain 30 minutes of daily weight bearing activity.    3.  Hot Flashes:  She declines medical intervention at this time.     4.  Followup:  Return to clinic around 3 months for right breast screening mammogram and visit with me.       It  was a pleasure to see Ms. Vegas in clinic today.  A total of 15 minutes of our 20 minute face-to-face visit was spent counseling the patient.

## 2017-07-24 ENCOUNTER — ONCOLOGY VISIT (OUTPATIENT)
Dept: ONCOLOGY | Facility: CLINIC | Age: 62
End: 2017-07-24
Attending: INTERNAL MEDICINE
Payer: COMMERCIAL

## 2017-07-24 VITALS
SYSTOLIC BLOOD PRESSURE: 142 MMHG | BODY MASS INDEX: 27.46 KG/M2 | HEIGHT: 62 IN | RESPIRATION RATE: 16 BRPM | OXYGEN SATURATION: 99 % | DIASTOLIC BLOOD PRESSURE: 89 MMHG | HEART RATE: 77 BPM | TEMPERATURE: 98 F | WEIGHT: 149.2 LBS

## 2017-07-24 DIAGNOSIS — Z12.31 VISIT FOR SCREENING MAMMOGRAM: ICD-10-CM

## 2017-07-24 DIAGNOSIS — Z17.0 MALIGNANT NEOPLASM OF OVERLAPPING SITES OF LEFT BREAST IN FEMALE, ESTROGEN RECEPTOR POSITIVE (H): Primary | ICD-10-CM

## 2017-07-24 DIAGNOSIS — C50.812 MALIGNANT NEOPLASM OF OVERLAPPING SITES OF LEFT BREAST IN FEMALE, ESTROGEN RECEPTOR POSITIVE (H): Primary | ICD-10-CM

## 2017-07-24 PROCEDURE — 99212 OFFICE O/P EST SF 10 MIN: CPT | Mod: ZF

## 2017-07-24 PROCEDURE — 99213 OFFICE O/P EST LOW 20 MIN: CPT | Mod: ZP | Performed by: INTERNAL MEDICINE

## 2017-07-24 ASSESSMENT — PAIN SCALES - GENERAL: PAINLEVEL: NO PAIN (0)

## 2017-07-24 NOTE — LETTER
7/24/2017       RE: Esther Vegas  [address] [facility] [address]     Dear Colleague,    Thank you for referring your patient, Esther Vegas, to the [facility]. Please see a copy of my visit note below.    MEDICAL ONCOLOGY FOLLOW-UP PATIENT VISIT     NAME: Esther Vegas     DATE: 7/24/2017    PRIMARY CARE PHYSICIAN: Yana Diaz    PATIENT ID: Grade I, stage IIb, T3N0M0, ER positive, MD positive, HER2 non-amplified invasive lobular carcinoma of the left breast. Oncotype DX recurrence score = 11.     Oncology History: Ms. Vegas is a 61 year old female with invasive lobular cancer of the left breast. Ms. Vegas had an abnormal routine screening mammogram performed on 10/23/15. The mammogram demonstrated a spiculated lesion in the upper outer left breast. Previous mammogram was 2.5 years prior. Ultrasound showed a 0.6 cm mass with indistinct margins at the 1:30 position, 3 cm from the nipple and a second 0.6 cm mass at the 1:00 position, 5 cm from the nipple. Contrast enhanced mammogram then showed non-masslike clumped enhancement in a segmental distribution from the 1 to 3:00 position of the left breast measuring 7.5 cm. Biopsy of the mass at the 1:00 position showed a grade 1 invasive lobular carcinoma and classical type LCIS. Estrogen receptor staining was strongly positive in >95% of tumor cell nuclei. Progesterone receptor staining was moderately positive in 60% of tumor cell nuclei. HER2 was nonamplified by FISH with a HER2 to SAVAGE-17 ratio of 1.2.     12/21/15 patient underwent left axillary sentinel lymph node biopsy and left nipple-sparing mastectomy with immediate reconstruction. Pathology showed: Invasive lobular carcinoma, Ransom grade 2, involving the upper outer quadrant, central breast and lower inner quadrant, size 5.4 cm. There was LCIS, classical type. The margins were negative (after further resection of the positive anterior margins). [place] LN  was negative (only one was resected). [id number] (stage IIB). Tumor was sent for oncotype dx testing which returned with a recurrence score of 11. A recurrence score of 11 correlates with an 8% risk of distant recurrence in 10 years after treatment with tamoxifen alone.  Adjuvant postmastectomy chest wall radiation was not recommended based on lack of high risk features.  She has been on adjuvant letrozole since 1/13/16.    Interim History:   Esther Eagle comes in to clinic today for routine 3-month breast cancer followup.  She continues on treatment with letrozole.  She has been on the medication for approximately 1.5 years.  She states overall she feels well.  She continues to have intermittent hot flashes.  Although they seem to be less intense than at prior visits, they are still occurring both day and night.  She states they are tolerable and she isn't interested in medical treatment of them.  She denies vaginal dryness or mood disturbances.  She denies new bone or joint aches or pains.  She continues to exercise on a regular basis.  In fact, she has been routinely climbing the stairs in [place] to prepare for a trip with her  to [place] [facility] in January. She, her , and son just returned from a trip to [facility].  It was a good trip.  She denies concerning lumps of masses of either breast.  She denies cough, shortness of breath, or chest pain.  She has no abdominal complaints.  She denies headaches, visual changes, or focal neurologic complaints.  The remainder of a 10 point review of systems is otherwise negative.      REVIEW OF SYSTEMS: Full 10-point review of systems was performed. Pertinent symptoms are reviewed above per HPI.    PAST MEDICAL HISTORY:   Past Medical History:   Diagnosis Date     BCC (basal cell carcinoma of skin)      Breast cancer (H) 2015    left, invasive lobular     GERD (gastroesophageal reflux disease)      Other malignant neoplasm of skin, site unspecified       "Tinnitus      Variants of migraine, not elsewhere classified, without mention of intractable migraine without mention of status migrainosus     menstrual migraines       PAST SURGICAL HISTORY:  Past Surgical History:   Procedure Laterality Date     BIOPSY NODE SENTINEL Left 12/21/2015    Procedure: BIOPSY NODE SENTINEL;  Surgeon: Frnacisco Gomez MD;  [facility]     COLONOSCOPY  2006     HEMORRHOIDECTOMY  5/2006     MASTECTOMY, RECONSTRUCT BREAST, COMBINED Left 12/21/2015    Procedure: COMBINED MASTECTOMY, RECONSTRUCT BREAST;  Surgeon: Francisco Gomez MD;  [facility]     MASTOPEXY Right 2/15/2016    Procedure: MASTOPEXY;  Surgeon: RADHA Friedman MD;  [facility]     RECONSTRUCT BREAST Left 12/21/2015    Procedure: RECONSTRUCT BREAST;  Surgeon: Francisco Gomez MD;  [facility]     REMOVE AND REPLACE BREAST IMPLANT PROSTHESIS Left 2/15/2016    Procedure: REMOVE AND REPLACE BREAST IMPLANT PROSTHESIS;  Surgeon: RADHA Friedman MD;  [facility]       MEDS:  Current Outpatient Prescriptions   Medication     letrozole (FEMARA) 2.5 MG tablet     Vaginal Lubricant (REPLENS) GEL     calcium-vitamin D 500-125 MG-UNIT TABS     Ibuprofen (ADVIL PO)     cetirizine (ZYRTEC) 10 MG tablet     No current facility-administered medications for this visit.        ALLERGIES:  Allergies   Allergen Reactions     Bactrim [Sulfamethoxazole W/Trimethoprim]      Facial redness     Codeine      Dizziness and nausea     Vicodin [Hydrocodone-Acetaminophen] Nausea        PHYSICAL EXAM:  /89  Pulse 77  Temp 98  F (36.7  C) (Oral)  Resp 16  Ht 1.58 m (5' 2.21\")  Wt 67.7 kg (149 lb 3.2 oz)  LMP 01/25/2007  SpO2 99%  BMI 27.11 kg/m2  LMP 01/25/2007  KPS: 100%  GENERAL: A&Ox3, well nourished, well appearing adult female in no acute distress  HEENT: Normocephalic, atraumatic.  MMM.  No lesions of the oropharynx  LYMPH: No palpable cervical, supraclavicular, or axillary lymphadenopathy  CV: RRR, normal " S1/S2, No murmurs, gallops, or rubs  BREAST:  Left breast mastectomy with reconstruction.  There are no palpable masses of either the right breast or overlying the left reconstruction.  Right nipple is everted.    LUNGS: CTA B/L, no wheezing or rhonchi  ABDOMEN: soft, nontender, non-distended, no hepatosplenomegaly. Bowel sounds present.  EXTREMITIES: no lower extremity edema  NEURO: Cranial nerves II-XII grossly intact  INTEGUMENT: No rashes    LABS REVIEWED THIS VISIT:  No interim laboratory or imaging studies pertinent to today's visit.    IMPRESSION/PLAN: 62 yo female with stage IIb, T3N0M0, grade I, ER positive, AK positive, HER2 negative invasive lobular carcinoma of the left breast. She is s/p treatment with left breast mastectomy. Oncotype dx recurrence score was 11 which is in the low risk of recurrence category. Given lack of high risk features (low grade, no positive margins or evidence of LVI), adjuvant radiation was not recommended. She has been on adjuvant letrozole since 01/13/2016.    1.  Left breast estrogen receptor-positive cancer:  Ms. Vegas is approximately 1 year 7 months out from excision of her left-sided breast cancer.  She has been on adjuvant letrozole for 1.5 years.  She is tolerating treatment remarkably well with the exception of hot flashes which she describes as tolerable.  We are planning on treating her for a minimum of 5 years and will discuss the risks and benefits of extended endocrine therapy at the 5 year point.  There is no evidence of disease recurrence on my clinical breast exam performed today.  I will see her back in clinic in 3 months.  She is due for annual screening right breast mammogram at the time of her return visit.    2.  Bone health:  DEXA bone density scan in 01/2016 showed normal bone density, however, she is at increased risk of osteoporosis on letrozole therapy.  She will continue to take calcium 1200 mg and vitamin D 1000 international units daily as well  as obtain 30 minutes of daily weight bearing activity.    3.  Hot Flashes:  She declines medical intervention at this time.     4.  Followup:  Return to clinic around 3 months for right breast screening mammogram and visit with me.       It was a pleasure to see Ms. Vegas in clinic today.  A total of 15 minutes of our 20 minute face-to-face visit was spent counseling the patient.         Again, thank you for allowing me to participate in the care of your patient.      Sincerely,    Genevieve Rojas MD

## 2017-07-24 NOTE — MR AVS SNAPSHOT
"              After Visit Summary   7/24/2017    Esther Vegas    MRN: [id number]           Patient Information     Date Of Birth          1955        Visit Information        Provider Department      7/24/2017 3:45 PM Genevieve Rojas MD [facility]        Today's Diagnoses     Malignant neoplasm of overlapping sites of left breast in female, estrogen receptor positive (H)    -  1    Visit for screening mammogram           Follow-ups after your visit        Your next 10 appointments already scheduled     Oct 30, 2017 11:45 AM CDT   MA SCREEN RIGHT W/ TRISHA with MGMA2, MG MA TECH   [facility]    [address] [place] [id number] [phone]           Do not use any powder, lotion or deodorant under your arms or on your breast. If you do, we will ask you to remove it before your exam.  Wear comfortable, two-piece clothing.  If you have any allergies, tell your care team.  Bring any previous mammograms from other facilities or have them mailed to the breast center.  Three-dimensional (3D) mammograms are available at [place] locations in [facility], and [place]. [facility] locations include [facility] in [place].   Benefits of 3D mammograms include: - Improved rate of cancer detection - Decreases your chance of having to go back for more tests, which means fewer: - \"False-positive\" results (This means that there is an abnormal area but it isn't cancer.) - Invasive testing procedures, such as a biopsy or surgery - Can provide clearer images of the breast if you have dense breast tissue. 3D mammography is an optional exam that anyone can have with a 2D mammogram. It doesn't replace or take the place of a 2D mammogram. 2D mammograms remain an effective screening test for all women.  Not all insurance companies cover the cost of a 3D mammogram. " Check with your insurance.            Oct 30, 2017 12:45 PM CDT   (Arrive by 12:30 PM)   Return Visit with Genevieve Rojas MD   [facility] ([facility])    [address]  [address] [facility] [id number] [phone]            Nov 07, 2017  8:15 AM CST   (Arrive by 8:00 AM)   Return Visit with RADHA Friedman MD   [facility] ([facility])    [address] [place] [address]  [address] [facility] [id number] [phone]            Nov 17, 2017  4:00 PM CST   MyChart Physical Adult with Yana Diaz MD   [facility] ([facility])    [address] [place] [id number] [phone]              Who to contact     If you have questions or need follow up information about today's clinic visit or your schedule please contact [facility] directly at [phone].  Normal or non-critical lab and imaging results will be communicated to you by [facility], letter or phone within 4 business days after the clinic has received the results. If you do not hear from us within 7 days, please contact the clinic through [facility] or phone. If you have a critical or abnormal lab result, we will notify you by phone as soon as possible.  Submit refill requests through [facility] or call your pharmacy and they will forward the refill request to us. Please allow 3 business days for your refill to be completed.          Additional Information About Your Visit        [facility] Information     [facility] gives you secure access to your electronic health record. If you see a primary care provider, you can also send messages to your care team and make appointments. If you have questions, please call your primary care clinic.  If you do not have a primary care provider, please call [phone] and they will assist you.        Care EveryWhere ID     This is your Care  "EveryWhere ID. This could be used by other organizations to access your [facility] medical records  [id number]        Your Vitals Were     Pulse Temperature Respirations Height Last Period Pulse Oximetry    77 98  F (36.7  C) (Oral) 16 1.58 m (5' 2.21\") 01/25/2007 99%    BMI (Body Mass Index)                   27.11 kg/m2            Blood Pressure from Last 3 Encounters:   07/24/17 142/89   04/24/17 135/89   01/23/17 (!) 160/91    Weight from Last 3 Encounters:   07/24/17 67.7 kg (149 lb 3.2 oz)   04/24/17 67.9 kg (149 lb 11.2 oz)   01/23/17 68 kg (150 lb)               Primary Care Provider Office Phone # Fax #    Yana Diaz [phone]       [facility] [address] [facility] [address]  [facility] [address]        Equal Access to Services     [facility] : Hadii aad ku hadasho Soomaali, waaxda luqadaha, qaybta kaalmada adeegyada, waxay idiin hayaan adeeg kharaeliot la'claudion . So [facility] [phone].    ATENCIÓN: Si habla español, tiene a neal disposición servicios gratuitos de asistencia lingüística. Llame al [phone].    We comply with applicable federal civil rights laws and [place] laws. We do not discriminate on the basis of race, color, national origin, age, disability sex, sexual orientation or gender identity.            Thank you!     Thank you for choosing [facility]  for your care. Our goal is always to provide you with excellent care. Hearing back from our patients is one way we can continue to improve our services. Please take a few minutes to complete the written survey that you may receive in the mail after your visit with us. Thank you!             Your Updated Medication List - Protect others around you: Learn how to safely use, store and throw away your medicines at www.disposemymeds.org.          This list is accurate as of: 7/24/17 11:59 PM.  Always use your most recent med list.                   Brand Name Dispense Instructions for use Diagnosis    " ADVIL PO      Take 400 mg by mouth every 6 hours as needed        calcium-vitamin D 500-125 MG-UNIT Tabs      Take by mouth daily        cetirizine 10 MG tablet    zyrTEC     Take 10 mg by mouth daily        letrozole 2.5 MG tablet    FEMARA    30 tablet    Take 1 tablet (2.5 mg) by mouth daily    Malignant neoplasm of overlapping sites of left female breast (H)       replens Gel     35 g    Place 1 Applicatorful vaginally 2 times daily as needed    Vaginal atrophy

## 2017-07-24 NOTE — NURSING NOTE
"Oncology Rooming Note    July 24, 2017 3:57 PM   Esther Vegas is a 61 year old female who presents for:    Chief Complaint   Patient presents with     Oncology Clinic Visit     breast ca      Initial Vitals: BP (!) 151/94  Pulse 77  Temp 98  F (36.7  C) (Oral)  Resp 16  Ht 1.58 m (5' 2.21\")  Wt 67.7 kg (149 lb 3.2 oz)  LMP 01/25/2007  SpO2 99%  BMI 27.11 kg/m2 Estimated body mass index is 27.11 kg/(m^2) as calculated from the following:    Height as of this encounter: 1.58 m (5' 2.21\").    Weight as of this encounter: 67.7 kg (149 lb 3.2 oz). Body surface area is 1.72 meters squared.  No Pain (0) Comment: Data Unavailable   Patient's last menstrual period was 01/25/2007.  Allergies reviewed: Yes  Medications reviewed: Yes    Medications: Medication refills not needed today.  Pharmacy name entered into [email or web address]: [id number] [place] [address]    Clinical concerns: no doc was NOT notified.    5 minutes for nursing intake (face to face time)     Srikanth Moon CMA              "

## 2017-10-27 ENCOUNTER — ANESTHESIA EVENT (OUTPATIENT)
Dept: GASTROENTEROLOGY | Facility: CLINIC | Age: 62
End: 2017-10-27
Payer: COMMERCIAL

## 2017-10-27 NOTE — ANESTHESIA PREPROCEDURE EVALUATION
Anesthesia Evaluation     . Pt has had prior anesthetic. Type: MAC and General           ROS/MED HX    ENT/Pulmonary:  - neg pulmonary ROS     Neurologic:     (+)migraines,     Cardiovascular:         METS/Exercise Tolerance:  >4 METS   Hematologic:  - neg hematologic  ROS       Musculoskeletal:  - neg musculoskeletal ROS       GI/Hepatic:     (+) GERD Asymptomatic on medication,       Renal/Genitourinary:  - ROS Renal section negative       Endo:  - neg endo ROS       Psychiatric:  - neg psychiatric ROS       Infectious Disease:  - neg infectious disease ROS       Malignancy:   (+) Malignancy History of Breast and Skin  Breast CA status post Surgery.         Other:    - neg other ROS                 Physical Exam  Normal systems: cardiovascular, pulmonary and dental    Airway   Mallampati: I  TM distance: >3 FB  Neck ROM: full    Dental     Cardiovascular       Pulmonary                     Anesthesia Plan      History & Physical Review      ASA Status:  2 .    NPO Status:  > 4 hours    Plan for MAC Reason for MAC:  Deep or markedly invasive procedure (G8)         Postoperative Care      Consents  Anesthetic plan, risks, benefits and alternatives discussed with:  Patient..                          .

## 2017-10-29 NOTE — PROGRESS NOTES
MEDICAL ONCOLOGY FOLLOW-UP PATIENT VISIT     NAME: Esther Vegas     DATE: 10/30/2017    PRIMARY CARE PHYSICIAN: Yana Diaz    PATIENT ID: Grade I, stage IIb, T3N0M0, ER positive, DC positive, HER2 non-amplified invasive lobular carcinoma of the left breast. Oncotype DX recurrence score = 11.     Oncology History: Ms. Vegas is a 61 year old female with invasive lobular cancer of the left breast. Routine screening mammogram on 10/23/15 demonstrated a spiculated lesion in the upper outer left breast. Previous mammogram was 2.5 years prior. Ultrasound showed a 0.6 cm mass with indistinct margins at the 1:30 position, 3 cm from the nipple and a second 0.6 cm mass at the 1:00 position, 5 cm from the nipple. Contrast enhanced mammogram then showed non-masslike clumped enhancement in a segmental distribution from the 1 to 3:00 position of the left breast measuring 7.5 cm. Biopsy of the mass at the 1:00 position showed a grade 1 invasive lobular carcinoma and classical type LCIS. Estrogen receptor staining was strongly positive in >95% of tumor cell nuclei. Progesterone receptor staining was moderately positive in 60% of tumor cell nuclei. HER2 was nonamplified by FISH with a HER2 to SAVAGE-17 ratio of 1.2.     Left axillary sentinel lymph node biopsy and left nipple-sparing mastectomy with immediate reconstruction was performed on 12/21/15. Pathology showed invasive lobular carcinoma, Damari grade 2, involving the upper outer quadrant, central breast and lower inner quadrant, size 5.4 cm. There was LCIS, classical type. Surgical margins were negative. A single sentinel LN was negative. [id number] (stage IIB). Tumor was sent for oncotype dx testing which returned with a recurrence score of 11. A recurrence score of 11 correlates with an 8% risk of distant recurrence in 10 years after treatment with tamoxifen alone.  Adjuvant postmastectomy chest wall radiation was not recommended based on lack of high risk  features.  She has been on adjuvant letrozole since 1/13/16.    Interim History:   Esther Eagle comes in to clinic today for routine 3-month breast cancer followup.  She continues on letrozole and is tolerating the medication remarkably well.  She does continue to have intermittent hot flashes.  She states that the hot flashes are occurring at the same frequency; however, they are more intense than they were previously.  They are still not bothersome enough to interfere with sleep or daily activity and therefore she continues to decline medication for them.  She and her  have been preparing for their trip to [facility] that they will be taking this January.  They have been hiking with their backpacks.  In fact, they recently went on a trip to [facility] and [facility] hiking with their backpacks.  Upon returning, she developed pain behind her right knee.  She is now taking a break from exercise this week.  She has also noted that when she steps on the gas pedal she will feel an achiness in the medial upper knee.  She has not been requiring any pain medication for this.  She has some achiness at the bases of the bilateral thumbs.  She states she is known to have arthritis in this area.  She denies other bone or joint aches or pains.  She has not noted any new breast lumps or masses.  She denies cough, shortness of breath, or chest pain.  She has no nausea, vomiting, diarrhea, or constipation.  She denies headaches, visual changes, or focal neurologic complaints.  The remainder of a 10-point review of systems is otherwise negative.     REVIEW OF SYSTEMS: Full 10-point review of systems was performed. Pertinent symptoms are reviewed above per HPI.    PAST MEDICAL HISTORY:   Past Medical History:   Diagnosis Date     BCC (basal cell carcinoma of skin)      Breast cancer (H) 2015    left, invasive lobular     GERD (gastroesophageal reflux disease)      Other malignant neoplasm of skin, site unspecified       "Tinnitus      Variants of migraine, not elsewhere classified, without mention of intractable migraine without mention of status migrainosus     menstrual migraines       PAST SURGICAL HISTORY:  Past Surgical History:   Procedure Laterality Date     BIOPSY NODE SENTINEL Left 12/21/2015    Procedure: BIOPSY NODE SENTINEL;  Surgeon: Francisco Gomez MD;  Location: [facility]     COLONOSCOPY  2006     HEMORRHOIDECTOMY  5/2006     MASTECTOMY, RECONSTRUCT BREAST, COMBINED Left 12/21/2015    Procedure: COMBINED MASTECTOMY, RECONSTRUCT BREAST;  Surgeon: Francisco Gomez MD;  Location: [facility]     MASTOPEXY Right 2/15/2016    Procedure: MASTOPEXY;  Surgeon: RADHA Friedman MD;  [facility]     RECONSTRUCT BREAST Left 12/21/2015    Procedure: RECONSTRUCT BREAST;  Surgeon: Francisco Gomez MD;  [facility]     REMOVE AND REPLACE BREAST IMPLANT PROSTHESIS Left 2/15/2016    Procedure: REMOVE AND REPLACE BREAST IMPLANT PROSTHESIS;  Surgeon: RADHA Friedman MD;  Location[facility]       MEDS:  Current Outpatient Prescriptions   Medication     letrozole (FEMARA) 2.5 MG tablet     Vaginal Lubricant (REPLENS) GEL     calcium-vitamin D 500-125 MG-UNIT TABS     Ibuprofen (ADVIL PO)     cetirizine (ZYRTEC) 10 MG tablet     No current facility-administered medications for this visit.        ALLERGIES:  Allergies   Allergen Reactions     Bactrim [Sulfamethoxazole W/Trimethoprim]      Facial redness     Codeine      Dizziness and nausea     Vicodin [Hydrocodone-Acetaminophen] Nausea        PHYSICAL EXAM:  /85  Pulse 83  Temp 97.2  F (36.2  C) (Oral)  Resp 16  Ht 1.58 m (5' 2.21\")  Wt 66 kg (145 lb 8 oz)  LMP 01/25/2007  SpO2 99%  BMI 26.44 kg/m2  LMP 01/25/2007  KPS: 100%  GENERAL: A&Ox3, well nourished, well appearing adult female in no acute distress  HEENT: Normocephalic, atraumatic.  MMM.  No lesions of the oropharynx  LYMPH: No palpable cervical, supraclavicular, or axillary lymphadenopathy  CV: RRR, normal " S1/S2, No murmurs, gallops, or rubs  BREAST:  Left breast mastectomy with reconstruction.  There is a palpable nodularity at the 4:30 position of the right breast and increased density beneath the right breast scar.  There are no dominant masses palpable in the right breast.  Right nipple is everted.    LUNGS: CTA B/L, no wheezing or rhonchi  ABDOMEN: soft, nontender, non-distended, no hepatosplenomegaly. Bowel sounds present.  EXTREMITIES: no lower extremity edema  NEURO: Cranial nerves II-XII grossly intact  INTEGUMENT: No rashes    LABS REVIEWED THIS VISIT:  10/30/17 Right breast mammogram:  FINDINGS:      BREAST DENSITY: Scattered fibroglandular densities.     No significant change mammographically. Breast reduction changes.         IMPRESSION: BI-RADS CATEGORY: 2 - Benign Finding(s).    IMPRESSION/PLAN: 62 yo female with stage IIb, T3N0M0, grade I, ER positive, CO positive, HER2 negative invasive lobular carcinoma of the left breast. She is s/p treatment with left breast mastectomy and has been on letrozole since 01/13/2016.    1.  Left breast estrogen receptor-positive cancer:  Ms. Vegas is approximately 1 year 10 months out from excision of her left-sided breast cancer.  She has been on adjuvant letrozole for nearly the same amount of time.  She is tolerating treatment well.  We are planning on treating her for a minimum of 5 years (thru 01/2021) and will discuss the risks and benefits of extended endocrine therapy at the 5 year point.  There is no evidence of disease recurrence on either my clinical breast exam or mammogram performed today.  I will see her back in clinic in 4 months.    2.  Bone health:  DEXA bone density scan in 01/2016 showed normal bone density.  She is at increased risk of osteoporosis on letrozole therapy.  She will continue to take calcium 1200 mg and vitamin D 1000 international units daily as well as continue daily weight bearing activity.  We will repeat a DEXA at the time of her  return visit.    3.  Hot Flashes:  She declines medical intervention at this time.      4.  Right knee pain:  Likely overuse injury.  Agree with rest x 1-2 weeks.  Asked that she contact the clinic if no improvement with rest, at which time I would recommend referral to sports medicine.    5.  Followup:  Return to in 4 months for DEXA bone density scan and visit with me.     It was a pleasure to see Ms. Vegas in clinic today.  A total of 20 minutes of our 25 minute face-to-face visit was spent counseling the patient.

## 2017-10-30 ENCOUNTER — ONCOLOGY VISIT (OUTPATIENT)
Dept: ONCOLOGY | Facility: CLINIC | Age: 62
End: 2017-10-30
Attending: INTERNAL MEDICINE
Payer: COMMERCIAL

## 2017-10-30 ENCOUNTER — RADIANT APPOINTMENT (OUTPATIENT)
Dept: MAMMOGRAPHY | Facility: CLINIC | Age: 62
End: 2017-10-30
Attending: INTERNAL MEDICINE

## 2017-10-30 VITALS
DIASTOLIC BLOOD PRESSURE: 85 MMHG | RESPIRATION RATE: 16 BRPM | SYSTOLIC BLOOD PRESSURE: 175 MMHG | TEMPERATURE: 97.2 F | WEIGHT: 145.5 LBS | OXYGEN SATURATION: 99 % | HEART RATE: 83 BPM | HEIGHT: 62 IN | BODY MASS INDEX: 26.78 KG/M2

## 2017-10-30 DIAGNOSIS — Z79.811 AROMATASE INHIBITOR USE: ICD-10-CM

## 2017-10-30 DIAGNOSIS — M89.9 DISORDER OF BONE AND CARTILAGE: ICD-10-CM

## 2017-10-30 DIAGNOSIS — M94.9 DISORDER OF BONE AND CARTILAGE: ICD-10-CM

## 2017-10-30 DIAGNOSIS — Z17.0 MALIGNANT NEOPLASM OF OVERLAPPING SITES OF LEFT BREAST IN FEMALE, ESTROGEN RECEPTOR POSITIVE (H): Primary | ICD-10-CM

## 2017-10-30 DIAGNOSIS — Z12.31 VISIT FOR SCREENING MAMMOGRAM: ICD-10-CM

## 2017-10-30 DIAGNOSIS — C50.812 MALIGNANT NEOPLASM OF OVERLAPPING SITES OF LEFT BREAST IN FEMALE, ESTROGEN RECEPTOR POSITIVE (H): Primary | ICD-10-CM

## 2017-10-30 PROCEDURE — 99214 OFFICE O/P EST MOD 30 MIN: CPT | Mod: ZP | Performed by: INTERNAL MEDICINE

## 2017-10-30 PROCEDURE — 99213 OFFICE O/P EST LOW 20 MIN: CPT | Mod: ZF

## 2017-10-30 RX ORDER — LETROZOLE 2.5 MG/1
2.5 TABLET, FILM COATED ORAL DAILY
Qty: 30 TABLET | Refills: 11 | Status: SHIPPED | OUTPATIENT
Start: 2017-10-30 | End: 2018-12-31

## 2017-10-30 ASSESSMENT — PAIN SCALES - GENERAL: PAINLEVEL: NO PAIN (0)

## 2017-10-30 NOTE — MR AVS SNAPSHOT
After Visit Summary   10/30/2017    Esther Vegas    MRN: [id number]           Patient Information     Date Of Birth          1955        Visit Information        Provider Department      10/30/2017 12:45 PM Genevieve Rojas MD [employer] [facility]        Today's Diagnoses     Malignant neoplasm of overlapping sites of left breast in female, estrogen receptor positive (H)    -  1    Disorder of bone and cartilage        Aromatase inhibitor use           Follow-ups after your visit        Your next 10 appointments already scheduled     Nov 07, 2017  8:15 AM CST   (Arrive by 8:00 AM)   Return Visit with RADHA Friedman MD   [facility] [place] ([facility])    [address]  [address] Floor  [facility] [address] [phone]            Nov 10, 2017   Procedure with Harjeet Dong MD   [facility] ([facility])    [address] [address] [phone]           The medical center is located at [address] (between [address] and [address] in [place], [place] miles north of [place]).            Nov 17, 2017  4:00 PM CST   MyChart Physical Adult with Yana Diaz MD   [facility] ([facility])    [address] [place] [address] [phone]              Who to contact     If you have questions or need follow up information about today's clinic visit or your schedule please contact [facility] directly at [phone].  Normal or non-critical lab and imaging results will be communicated to you by MyChart, letter or phone within 4 business days after the clinic has received the results. If you do not hear from us within 7 days, please contact the clinic through MyChart or phone. If you have a critical or abnormal lab result, we will notify you by phone as soon as possible.  Submit refill requests  "through [employer] or call your pharmacy and they will forward the refill request to us. Please allow 3 business days for your refill to be completed.          Additional Information About Your Visit        [employer] Information     [employer] gives you secure access to your electronic health record. If you see a primary care provider, you can also send messages to your care team and make appointments. If you have questions, please call your primary care clinic.  If you do not have a primary care provider, please call [phone] and they will assist you.        Care EveryWhere ID     This is your Care EveryWhere ID. This could be used by other organizations to access your [facility] medical records  [id number]        Your Vitals Were     Pulse Temperature Respirations Height Last Period Pulse Oximetry    83 97.2  F (36.2  C) (Oral) 16 1.58 m (5' 2.21\") 01/25/2007 99%    BMI (Body Mass Index)                   26.44 kg/m2            Blood Pressure from Last 3 Encounters:   10/30/17 175/85   07/24/17 142/89   04/24/17 135/89    Weight from Last 3 Encounters:   10/30/17 66 kg (145 lb 8 oz)   07/24/17 67.7 kg (149 lb 3.2 oz)   04/24/17 67.9 kg (149 lb 11.2 oz)                 Where to get your medicines      These medications were sent to [facility] [id number] - [address] [address], [place] [address]     Phone:  [phone]     letrozole 2.5 MG tablet          Primary Care Provider Office Phone # Fax #    Yana Diaz [phone]       [address] [address]        Equal Access to Services     ELVA HERCULES AH: Siddhartha Marinelli, amy carballo, pee fang. So [facility] [phone].    ATENCIÓN: Si habla español, tiene a neal disposición servicios gratuitos de asistencia lingüística. Sahra al [phone].    We comply with applicable " federal civil rights laws and [place] laws. We do not discriminate on the basis of race, color, national origin, age, disability, sex, sexual orientation, or gender identity.            Thank you!     Thank you for choosing [facility]  for your care. Our goal is always to provide you with excellent care. Hearing back from our patients is one way we can continue to improve our services. Please take a few minutes to complete the written survey that you may receive in the mail after your visit with us. Thank you!             Your Updated Medication List - Protect others around you: Learn how to safely use, store and throw away your medicines at www.disposemymeds.org.          This list is accurate as of: 10/30/17 11:59 PM.  Always use your most recent med list.                   Brand Name Dispense Instructions for use Diagnosis    ADVIL PO      Take 400 mg by mouth every 6 hours as needed        calcium-vitamin D 500-125 MG-UNIT Tabs      Take by mouth daily        cetirizine 10 MG tablet    zyrTEC     Take 10 mg by mouth daily        letrozole 2.5 MG tablet    FEMARA    30 tablet    Take 1 tablet (2.5 mg) by mouth daily    Malignant neoplasm of overlapping sites of left breast in female, estrogen receptor positive (H)       replens Gel     35 g    Place 1 Applicatorful vaginally 2 times daily as needed    Vaginal atrophy

## 2017-10-30 NOTE — LETTER
10/30/2017       RE: Esther Vegas  [address] [facility] [address]     Dear Colleague,    Thank you for referring your patient, Esther Vegas, to the [facility]. Please see a copy of my visit note below.    MEDICAL ONCOLOGY FOLLOW-UP PATIENT VISIT     NAME: Esther Veags     DATE: 10/30/2017    PRIMARY CARE PHYSICIAN: Yana Diaz    PATIENT ID: Grade I, stage IIb, T3N0M0, ER positive, IL positive, HER2 non-amplified invasive lobular carcinoma of the left breast. Oncotype DX recurrence score = 11.     Oncology History: Ms. Vegas is a 61 year old female with invasive lobular cancer of the left breast. Routine screening mammogram on 10/23/15 demonstrated a spiculated lesion in the upper outer left breast. Previous mammogram was 2.5 years prior. Ultrasound showed a 0.6 cm mass with indistinct margins at the 1:30 position, 3 cm from the nipple and a second 0.6 cm mass at the 1:00 position, 5 cm from the nipple. Contrast enhanced mammogram then showed non-masslike clumped enhancement in a segmental distribution from the 1 to 3:00 position of the left breast measuring 7.5 cm. Biopsy of the mass at the 1:00 position showed a grade 1 invasive lobular carcinoma and classical type LCIS. Estrogen receptor staining was strongly positive in >95% of tumor cell nuclei. Progesterone receptor staining was moderately positive in 60% of tumor cell nuclei. HER2 was nonamplified by FISH with a HER2 to SAVAGE-17 ratio of 1.2.     Left axillary sentinel lymph node biopsy and left nipple-sparing mastectomy with immediate reconstruction was performed on 12/21/15. Pathology showed invasive lobular carcinoma, Jerusalem grade 2, involving the upper outer quadrant, central breast and lower inner quadrant, size 5.4 cm. There was LCIS, classical type. Surgical margins were negative. A single sentinel LN was negative. [id number] (stage IIB). Tumor was sent for oncotype dx testing which returned with a  recurrence score of 11. A recurrence score of 11 correlates with an 8% risk of distant recurrence in 10 years after treatment with tamoxifen alone.  Adjuvant postmastectomy chest wall radiation was not recommended based on lack of high risk features.  She has been on adjuvant letrozole since 1/13/16.    Interim History:   Esther Eagle comes in to clinic today for routine 3-month breast cancer followup.  She continues on letrozole and is tolerating the medication remarkably well.  She does continue to have intermittent hot flashes.  She states that the hot flashes are occurring at the same frequency; however, they are more intense than they were previously.  They are still not bothersome enough to interfere with sleep or daily activity and therefore she continues to decline medication for them.  She and her  have been preparing for their trip to [facility] that they will be taking this January.  They have been hiking with their backpacks.  In fact, they recently went on a trip to [facility] and [facility] hiking with their backpacks.  Upon returning, she developed pain behind her right knee.  She is now taking a break from exercise this week.  She has also noted that when she steps on the gas pedal she will feel an achiness in the medial upper knee.  She has not been requiring any pain medication for this.  She has some achiness at the bases of the bilateral thumbs.  She states she is known to have arthritis in this area.  She denies other bone or joint aches or pains.  She has not noted any new breast lumps or masses.  She denies cough, shortness of breath, or chest pain.  She has no nausea, vomiting, diarrhea, or constipation.  She denies headaches, visual changes, or focal neurologic complaints.  The remainder of a 10-point review of systems is otherwise negative.     REVIEW OF SYSTEMS: Full 10-point review of systems was performed. Pertinent symptoms are reviewed above per HPI.    PAST MEDICAL HISTORY:  "  Past Medical History:   Diagnosis Date     BCC (basal cell carcinoma of skin)      Breast cancer (H) 2015    left, invasive lobular     GERD (gastroesophageal reflux disease)      Other malignant neoplasm of skin, site unspecified      Tinnitus      Variants of migraine, not elsewhere classified, without mention of intractable migraine without mention of status migrainosus     menstrual migraines       PAST SURGICAL HISTORY:  Past Surgical History:   Procedure Laterality Date     BIOPSY NODE SENTINEL Left 12/21/2015    Procedure: BIOPSY NODE SENTINEL;  Surgeon: Francisco Gomez MD;  [facility]: [facility]     COLONOSCOPY  2006     HEMORRHOIDECTOMY  5/2006     MASTECTOMY, RECONSTRUCT BREAST, COMBINED Left 12/21/2015    Procedure: COMBINED MASTECTOMY, RECONSTRUCT BREAST;  Surgeon: Francisco Gomez MD;  [facility]     MASTOPEXY Right 2/15/2016    Procedure: MASTOPEXY;  Surgeon: RADHA Friedman MD;  [facility]     RECONSTRUCT BREAST Left 12/21/2015    Procedure: RECONSTRUCT BREAST;  Surgeon: Francisco Gomez MD;  [facility]     REMOVE AND REPLACE BREAST IMPLANT PROSTHESIS Left 2/15/2016    Procedure: REMOVE AND REPLACE BREAST IMPLANT PROSTHESIS;  Surgeon: RADHA Friedman MD;  [facility]       MEDS:  Current Outpatient Prescriptions   Medication     letrozole (FEMARA) 2.5 MG tablet     Vaginal Lubricant (REPLENS) GEL     calcium-vitamin D 500-125 MG-UNIT TABS     Ibuprofen (ADVIL PO)     cetirizine (ZYRTEC) 10 MG tablet     No current facility-administered medications for this visit.        ALLERGIES:  Allergies   Allergen Reactions     Bactrim [Sulfamethoxazole W/Trimethoprim]      Facial redness     Codeine      Dizziness and nausea     Vicodin [Hydrocodone-Acetaminophen] Nausea        PHYSICAL EXAM:  /85  Pulse 83  Temp 97.2  F (36.2  C) (Oral)  Resp 16  Ht 1.58 m (5' 2.21\")  Wt 66 kg (145 lb 8 oz)  LMP 01/25/2007  SpO2 99%  BMI 26.44 kg/m2  LMP 01/25/2007  KPS: 100%  GENERAL: " A&Ox3, well nourished, well appearing adult female in no acute distress  HEENT: Normocephalic, atraumatic.  MMM.  No lesions of the oropharynx  LYMPH: No palpable cervical, supraclavicular, or axillary lymphadenopathy  CV: RRR, normal S1/S2, No murmurs, gallops, or rubs  BREAST:  Left breast mastectomy with reconstruction.  There is a palpable nodularity at the 4:30 position of the right breast and increased density beneath the right breast scar.  There are no dominant masses palpable in the right breast.  Right nipple is everted.    LUNGS: CTA B/L, no wheezing or rhonchi  ABDOMEN: soft, nontender, non-distended, no hepatosplenomegaly. Bowel sounds present.  EXTREMITIES: no lower extremity edema  NEURO: Cranial nerves II-XII grossly intact  INTEGUMENT: No rashes    LABS REVIEWED THIS VISIT:  10/30/17 Right breast mammogram:  FINDINGS:      BREAST DENSITY: Scattered fibroglandular densities.     No significant change mammographically. Breast reduction changes.         IMPRESSION: BI-RADS CATEGORY: 2 - Benign Finding(s).    IMPRESSION/PLAN: 62 yo female with stage IIb, T3N0M0, grade I, ER positive, CO positive, HER2 negative invasive lobular carcinoma of the left breast. She is s/p treatment with left breast mastectomy and has been on letrozole since 01/13/2016.    1.  Left breast estrogen receptor-positive cancer:  Ms. Vegas is approximately 1 year 10 months out from excision of her left-sided breast cancer.  She has been on adjuvant letrozole for nearly the same amount of time.  She is tolerating treatment well.  We are planning on treating her for a minimum of 5 years (thru 01/2021) and will discuss the risks and benefits of extended endocrine therapy at the 5 year point.  There is no evidence of disease recurrence on either my clinical breast exam or mammogram performed today.  I will see her back in clinic in 4 months.    2.  Bone health:  DEXA bone density scan in 01/2016 showed normal bone density.  She is at  increased risk of osteoporosis on letrozole therapy.  She will continue to take calcium 1200 mg and vitamin D 1000 international units daily as well as continue daily weight bearing activity.  We will repeat a DEXA at the time of her return visit.    3.  Hot Flashes:  She declines medical intervention at this time.      4.  Right knee pain:  Likely overuse injury.  Agree with rest x 1-2 weeks.  Asked that she contact the clinic if no improvement with rest, at which time I would recommend referral to sports medicine.    5.  Followup:  Return to in 4 months for DEXA bone density scan and visit with me.     It was a pleasure to see Ms. Vegas in clinic today.  A total of 20 minutes of our 25 minute face-to-face visit was spent counseling the patient.         Again, thank you for allowing me to participate in the care of your patient.      Sincerely,    Genevieve Rojas MD

## 2017-10-30 NOTE — NURSING NOTE
"Oncology Rooming Note    October 30, 2017 12:44 PM   Esther Vegas is a 61 year old female who presents for:    Chief Complaint   Patient presents with     Oncology Clinic Visit     Breast CA- No new concerns     Initial Vitals: /85  Pulse 83  Temp 97.2  F (36.2  C) (Oral)  Resp 16  Ht 1.58 m (5' 2.21\")  Wt 66 kg (145 lb 8 oz)  LMP 01/25/2007  SpO2 99%  BMI 26.44 kg/m2 Estimated body mass index is 26.44 kg/(m^2) as calculated from the following:    Height as of this encounter: 1.58 m (5' 2.21\").    Weight as of this encounter: 66 kg (145 lb 8 oz). Body surface area is 1.7 meters squared.  No Pain (0) Comment: Data Unavailable   Patient's last menstrual period was 01/25/2007.  Allergies reviewed: Yes  Medications reviewed: Yes    Medications: MEDICATION REFILLS NEEDED TODAY. Provider was notified.  Pharmacy name entered into [facility]: [facility] [id number] [place] [address]    Clinical concerns: Patient needs to change pharmacy for refill from [facility] to [facility].     10 minutes for nursing intake (face to face time)     Ashli Newberry LPN            "

## 2017-11-07 ENCOUNTER — OFFICE VISIT (OUTPATIENT)
Dept: PLASTIC SURGERY | Facility: CLINIC | Age: 62
End: 2017-11-07
Attending: PLASTIC SURGERY
Payer: COMMERCIAL

## 2017-11-07 VITALS
OXYGEN SATURATION: 98 % | WEIGHT: 146.6 LBS | SYSTOLIC BLOOD PRESSURE: 170 MMHG | TEMPERATURE: 98.3 F | HEART RATE: 77 BPM | HEIGHT: 62 IN | BODY MASS INDEX: 26.98 KG/M2 | DIASTOLIC BLOOD PRESSURE: 92 MMHG

## 2017-11-07 DIAGNOSIS — Z98.890 S/P BREAST RECONSTRUCTION, LEFT: Primary | ICD-10-CM

## 2017-11-07 PROCEDURE — 99212 OFFICE O/P EST SF 10 MIN: CPT | Mod: ZF

## 2017-11-07 ASSESSMENT — PAIN SCALES - GENERAL: PAINLEVEL: NO PAIN (0)

## 2017-11-07 NOTE — MR AVS SNAPSHOT
After Visit Summary   11/7/2017    Esther Vegas    MRN: [id number]           Patient Information     Date Of Birth          1955        Visit Information        Provider Department      11/7/2017 8:15 AM RADHA Friedman MD [facility]        Today's Diagnoses     S/P breast reconstruction, left    -  1       Follow-ups after your visit        Follow-up notes from your care team     Return in about 1 year (around 11/7/2018).      Your next 10 appointments already scheduled     Nov 10, 2017   Procedure with Harjeet Dong MD   [facility] ([facility])    [address] [address]   [phone]           The medical center is located at [address]. (between [address] and [address] in [place], [place] north of [place]).            Nov 17, 2017  4:00 PM CST   [facility] Physical Adult with Yana Diaz MD   [facility] ([facility])    [address] [place] [address] [phone]              Who to contact     If you have questions or need follow up information about today's clinic visit or your schedule please contact [facility] directly at [phone].  Normal or non-critical lab and imaging results will be communicated to you by Invia.czhart, letter or phone within 4 business days after the clinic has received the results. If you do not hear from us within 7 days, please contact the clinic through Invia.czhart or phone. If you have a critical or abnormal lab result, we will notify you by phone as soon as possible.  Submit refill requests through [employer]leg[employer] or call your pharmacy and they will forward the refill request to us. Please allow 3 business days for your refill to be completed.          Additional Information About Your Visit        Invia[employer]har[employer] Engines Information     [employer] gives you secure access to your electronic health record. If you see a primary  "care provider, you can also send messages to your care team and make appointments. If you have questions, please call your primary care clinic.  If you do not have a primary care provider, please call [phone] and they will assist you.        Care EveryWhere ID     This is your Care EveryWhere ID. This could be used by other organizations to access your [place] medical records  [id number]        Your Vitals Were     Pulse Temperature Height Last Period Pulse Oximetry BMI (Body Mass Index)    77 98.3  F (36.8  C) (Oral) 5' 2.21\" 01/25/2007 98% 26.63 kg/m2       Blood Pressure from Last 3 Encounters:   11/07/17 (!) 170/92   10/30/17 175/85   07/24/17 142/89    Weight from Last 3 Encounters:   11/07/17 146 lb 9.6 oz   10/30/17 145 lb 8 oz   07/24/17 149 lb 3.2 oz              Today, you had the following     No orders found for display       Primary Care Provider Office Phone # Fax #    Yana Diaz [phone]       [address]  [facility] [address]        Equal Access to Services     [facility]: Hadii aad ku hadasho Soomaali, waaxda luqadaha, qaybta kaalmada adeegyada, pee francisco hayclaudion chi orona la'claudion ah. So [facility] [phone].    ATENCIÓN: Si habla español, tiene a neal disposición servicios gratuitos de asistencia lingüística. [facility] [phone].    We comply with applicable federal civil rights laws and [place] laws. We do not discriminate on the basis of race, color, national origin, age, disability, sex, sexual orientation, or gender identity.            Thank you!     Thank you for choosing [facility]  for your care. Our goal is always to provide you with excellent care. Hearing back from our patients is one way we can continue to improve our services. Please take a few minutes to complete the written survey that you may receive in the mail after your visit with us. Thank you!             Your Updated Medication List - Protect others around you: " Learn how to safely use, store and throw away your medicines at www.disposemymeds.org.          This list is accurate as of: 11/7/17  8:23 AM.  Always use your most recent med list.                   Brand Name Dispense Instructions for use Diagnosis    ADVIL PO      Take 400 mg by mouth every 6 hours as needed        calcium-vitamin D 500-125 MG-UNIT Tabs      Take by mouth daily        cetirizine 10 MG tablet    zyrTEC     Take 10 mg by mouth daily        letrozole 2.5 MG tablet    FEMARA    30 tablet    Take 1 tablet (2.5 mg) by mouth daily    Malignant neoplasm of overlapping sites of left breast in female, estrogen receptor positive (H)       replens Gel     35 g    Place 1 Applicatorful vaginally 2 times daily as needed    Vaginal atrophy

## 2017-11-07 NOTE — NURSING NOTE
"Oncology Rooming Note    November 7, 2017 8:18 AM   Esther Vegas is a 61 year old female who presents for:    Chief Complaint   Patient presents with     Oncology Clinic Visit     Follow up-Breast CA     Initial Vitals: BP (!) 170/92 (BP Location: Right arm, Patient Position: Sitting, Cuff Size: Adult Regular)  Pulse 77  Temp 98.3  F (36.8  C) (Oral)  Ht 1.58 m (5' 2.21\")  Wt 66.5 kg (146 lb 9.6 oz)  LMP 01/25/2007  SpO2 98%  BMI 26.63 kg/m2 Estimated body mass index is 26.63 kg/(m^2) as calculated from the following:    Height as of this encounter: 1.58 m (5' 2.21\").    Weight as of this encounter: 66.5 kg (146 lb 9.6 oz). Body surface area is 1.71 meters squared.  No Pain (0) Comment: Data Unavailable   Patient's last menstrual period was 01/25/2007.  Allergies reviewed: Yes  Medications reviewed: Yes    Medications: Medication refills not needed today.  Pharmacy name entered into [facility]:    [id number] [place], [place] - [address] [place] [address] DRUG STORE [facility] - [address] AT [facility] [address]    Clinical concerns: Questions Dr. Friedman was notified.    10 minutes for nursing intake (face to face time)     Annabella Bishop LPN              "

## 2017-11-07 NOTE — LETTER
11/7/2017       RE: Esther Vegas  [address] [facility] [address]     Dear Colleague,    Thank you for referring your patient, Esther Vegas, to the [facility] at [facility]. Please see a copy of my visit note below.    PRESENTING COMPLAINT:  Postoperative visit, status post left-sided stage II reconstruction and left-sided lift done on 02/15/2016.       HISTORY OF PRESENTING COMPLAINT:  Mr. Vegas is 60 years old.  She is now over 1.5 years  out from her last surgery, doing extremely well, very happy with the results, no issues.       PHYSICAL EXAMINATION:  On exam vital signs stable.  She is afebrile in no obvious distress.  Breasts are healed.  Right breast is slightly more ptotic than the left side but overall volume symmetry is excellent.       ASSESSMENT AND PLAN:  Based on above findings, a diagnosis of bilateral breast reconstruction was made.  She is happy with the result.  I am happy with the result.  I will see her back on a yearly basis to monitor the implant.  All questions were answered.  She was happy with the visit.  All exam done in the presence of my nurse.     Again, thank you for allowing me to participate in the care of your patient.      Sincerely,    RADHA Friedman MD

## 2017-11-10 ENCOUNTER — ANESTHESIA (OUTPATIENT)
Dept: GASTROENTEROLOGY | Facility: CLINIC | Age: 62
End: 2017-11-10
Payer: COMMERCIAL

## 2017-11-10 ENCOUNTER — HOSPITAL ENCOUNTER (OUTPATIENT)
Facility: CLINIC | Age: 62
Discharge: HOME | End: 2017-11-10
Attending: SURGERY | Admitting: SURGERY
Payer: COMMERCIAL

## 2017-11-10 ENCOUNTER — SURGERY (OUTPATIENT)
Age: 62
End: 2017-11-10

## 2017-11-10 VITALS
BODY MASS INDEX: 26.87 KG/M2 | TEMPERATURE: 98.2 F | RESPIRATION RATE: 16 BRPM | HEIGHT: 62 IN | SYSTOLIC BLOOD PRESSURE: 178 MMHG | OXYGEN SATURATION: 100 % | WEIGHT: 146 LBS | DIASTOLIC BLOOD PRESSURE: 96 MMHG

## 2017-11-10 LAB — COLONOSCOPY: NORMAL

## 2017-11-10 PROCEDURE — 25000125 ZZHC RX 250: Performed by: NURSE ANESTHETIST, CERTIFIED REGISTERED

## 2017-11-10 PROCEDURE — 45384 COLONOSCOPY W/LESION REMOVAL: CPT | Mod: 59 | Performed by: SURGERY

## 2017-11-10 PROCEDURE — 45385 COLONOSCOPY W/LESION REMOVAL: CPT | Mod: PT | Performed by: SURGERY

## 2017-11-10 PROCEDURE — 25000125 ZZHC RX 250: Performed by: SURGERY

## 2017-11-10 PROCEDURE — 45384 COLONOSCOPY W/LESION REMOVAL: CPT | Performed by: SURGERY

## 2017-11-10 PROCEDURE — 25000128 H RX IP 250 OP 636: Performed by: SURGERY

## 2017-11-10 PROCEDURE — 45385 COLONOSCOPY W/LESION REMOVAL: CPT | Mod: PT

## 2017-11-10 PROCEDURE — 37000008 ZZH ANESTHESIA TECHNICAL FEE, 1ST 30 MIN: Performed by: SURGERY

## 2017-11-10 PROCEDURE — 88305 TISSUE EXAM BY PATHOLOGIST: CPT | Performed by: SURGERY

## 2017-11-10 PROCEDURE — 25000128 H RX IP 250 OP 636: Performed by: NURSE ANESTHETIST, CERTIFIED REGISTERED

## 2017-11-10 PROCEDURE — 88305 TISSUE EXAM BY PATHOLOGIST: CPT | Mod: 26 | Performed by: SURGERY

## 2017-11-10 RX ORDER — ONDANSETRON 2 MG/ML
4 INJECTION INTRAMUSCULAR; INTRAVENOUS
Status: DISCONTINUED | OUTPATIENT
Start: 2017-11-10 | End: 2017-11-10 | Stop reason: HOSPADM

## 2017-11-10 RX ORDER — LIDOCAINE 40 MG/G
CREAM TOPICAL
Status: DISCONTINUED | OUTPATIENT
Start: 2017-11-10 | End: 2017-11-10 | Stop reason: HOSPADM

## 2017-11-10 RX ORDER — PROPOFOL 10 MG/ML
INJECTION, EMULSION INTRAVENOUS PRN
Status: DISCONTINUED | OUTPATIENT
Start: 2017-11-10 | End: 2017-11-10

## 2017-11-10 RX ORDER — SODIUM CHLORIDE, SODIUM LACTATE, POTASSIUM CHLORIDE, CALCIUM CHLORIDE 600; 310; 30; 20 MG/100ML; MG/100ML; MG/100ML; MG/100ML
INJECTION, SOLUTION INTRAVENOUS CONTINUOUS
Status: DISCONTINUED | OUTPATIENT
Start: 2017-11-10 | End: 2017-11-10 | Stop reason: HOSPADM

## 2017-11-10 RX ORDER — GLYCOPYRROLATE 0.2 MG/ML
INJECTION, SOLUTION INTRAMUSCULAR; INTRAVENOUS PRN
Status: DISCONTINUED | OUTPATIENT
Start: 2017-11-10 | End: 2017-11-10

## 2017-11-10 RX ORDER — LIDOCAINE HYDROCHLORIDE 10 MG/ML
INJECTION, SOLUTION EPIDURAL; INFILTRATION; INTRACAUDAL; PERINEURAL PRN
Status: DISCONTINUED | OUTPATIENT
Start: 2017-11-10 | End: 2017-11-10

## 2017-11-10 RX ORDER — PROPOFOL 10 MG/ML
INJECTION, EMULSION INTRAVENOUS CONTINUOUS PRN
Status: DISCONTINUED | OUTPATIENT
Start: 2017-11-10 | End: 2017-11-10

## 2017-11-10 RX ADMIN — LIDOCAINE HYDROCHLORIDE 100 MG: 10 INJECTION, SOLUTION EPIDURAL; INFILTRATION; INTRACAUDAL; PERINEURAL at 10:20

## 2017-11-10 RX ADMIN — GLYCOPYRROLATE 0.2 MG: 0.2 INJECTION, SOLUTION INTRAMUSCULAR; INTRAVENOUS at 10:20

## 2017-11-10 RX ADMIN — SODIUM CHLORIDE, POTASSIUM CHLORIDE, SODIUM LACTATE AND CALCIUM CHLORIDE: 600; 310; 30; 20 INJECTION, SOLUTION INTRAVENOUS at 09:31

## 2017-11-10 RX ADMIN — LIDOCAINE HYDROCHLORIDE 1 ML: 10 INJECTION, SOLUTION EPIDURAL; INFILTRATION; INTRACAUDAL; PERINEURAL at 09:32

## 2017-11-10 RX ADMIN — PROPOFOL 200 MCG/KG/MIN: 10 INJECTION, EMULSION INTRAVENOUS at 10:21

## 2017-11-10 RX ADMIN — PROPOFOL 100 MG: 10 INJECTION, EMULSION INTRAVENOUS at 10:21

## 2017-11-10 NOTE — ANESTHESIA CARE TRANSFER NOTE
Patient: Esther Vegas    Procedure(s):  colonoscopy with polyp removal - Wound Class: II-Clean Contaminated    Diagnosis: screening  Diagnosis Additional Information: No value filed.    Anesthesia Type:   MAC     Note:  Airway :Room Air  Patient transferred to:Phase II  Handoff Report: Identifed the Patient, Identified the Reponsible Provider, Reviewed the pertinent medical history, Discussed the surgical course, Reviewed Intra-OP anesthesia mangement and issues during anesthesia, Set expectations for post-procedure period and Allowed opportunity for questions and acknowledgement of understanding      Vitals: (Last set prior to Anesthesia Care Transfer)    CRNA VITALS  11/10/2017 1007 - 11/10/2017 1038      11/10/2017             Pulse: 85    SpO2: 100 %                Electronically Signed By: MARINO Mallory CRNA  November 10, 2017  10:38 AM

## 2017-11-10 NOTE — H&P
61 year old year old female here for colonoscopy for screening.    Patient Active Problem List   Diagnosis     Migraine variant     Other malignant neoplasm of skin, site unspecified     CARDIOVASCULAR SCREENING; LDL GOAL LESS THAN 160     Vaginal atrophy     Malignant neoplasm of left breast (H)     HX: breast cancer     S/P breast reconstruction, left       Past Medical History:   Diagnosis Date     BCC (basal cell carcinoma of skin)      Breast cancer (H) 2015    left, invasive lobular     GERD (gastroesophageal reflux disease)      Other malignant neoplasm of skin, site unspecified      Tinnitus      Variants of migraine, not elsewhere classified, without mention of intractable migraine without mention of status migrainosus     menstrual migraines       Past Surgical History:   Procedure Laterality Date     BIOPSY NODE SENTINEL Left 12/21/2015    Procedure: BIOPSY NODE SENTINEL;  Surgeon: Francisco Gomez MD;  [facility]     COLONOSCOPY  2006     HEMORRHOIDECTOMY  5/2006     MASTECTOMY, RECONSTRUCT BREAST, COMBINED Left 12/21/2015    Procedure: COMBINED MASTECTOMY, RECONSTRUCT BREAST;  Surgeon: Francisco Gomez MD;  [facility]     MASTOPEXY Right 2/15/2016    Procedure: MASTOPEXY;  Surgeon: RADHA Friedman MD;  [facility]     RECONSTRUCT BREAST Left 12/21/2015    Procedure: RECONSTRUCT BREAST;  Surgeon: Francisco Gomez MD;  [facility]     REMOVE AND REPLACE BREAST IMPLANT PROSTHESIS Left 2/15/2016    Procedure: REMOVE AND REPLACE BREAST IMPLANT PROSTHESIS;  Surgeon: RADHA rFiedman MD;  [facility]       @[facility]@    No current outpatient prescriptions on file.       Allergies   Allergen Reactions     Bactrim [Sulfamethoxazole W/Trimethoprim]      Facial redness     Codeine      Dizziness and nausea     Codeine      Other reaction(s): Dizziness  Dizziness and nausea     Sulfamethoxazole-Trimethoprim      Other reaction(s): Other - Describe In Comment Field  Facial redness      "Vicodin [Hydrocodone-Acetaminophen] Nausea       Pt reports that she has never smoked. She does not have any smokeless tobacco history on file. She reports that she drinks about 1.2 - 1.8 oz of alcohol per week  She reports that she does not use illicit drugs.    Exam:  /85  Temp 98.2  F (36.8  C) (Oral)  Resp 16  Ht 1.58 m (5' 2.21\")  Wt 66.2 kg (146 lb)  LMP 01/25/2007  SpO2 99%  Breastfeeding? No  BMI 26.52 kg/m2    Awake, Alert OX3  Lungs - CTA bilaterally  CV - RRR, no murmurs, distal pulses intact  Abd - soft, non-distended, non-tender, +BS  Extr - No cyanosis or edema    A/P 61 year old year old female in need of colonoscopy for screening. Risks, benefits, alternatives, and complications were discussed including the possibility of perforation and the patient agreed to proceed    Casey Correa MD     "

## 2017-11-10 NOTE — ANESTHESIA POSTPROCEDURE EVALUATION
Patient: Esther Vegas    Procedure(s):  colonoscopy with polyp removal - Wound Class: II-Clean Contaminated    Diagnosis:screening  Diagnosis Additional Information: No value filed.    Anesthesia Type:  MAC    Note:  Anesthesia Post Evaluation    Patient location during evaluation: Bedside  Patient participation: Able to fully participate in evaluation  Level of consciousness: awake and alert  Pain management: adequate  Airway patency: patent  Cardiovascular status: acceptable  Respiratory status: acceptable  Hydration status: acceptable  PONV: none     Anesthetic complications: None          Last vitals:  Vitals:    11/10/17 0920   BP: 181/85   Resp: 16   Temp: 36.8  C (98.2  F)   SpO2: 99%         Electronically Signed By: MARINO Mallory CRNA  November 10, 2017  10:38 AM

## 2017-11-14 LAB — COPATH REPORT: NORMAL

## 2017-11-17 ENCOUNTER — OFFICE VISIT (OUTPATIENT)
Dept: OBGYN | Facility: CLINIC | Age: 62
End: 2017-11-17
Payer: COMMERCIAL

## 2017-11-17 VITALS
BODY MASS INDEX: 27.22 KG/M2 | WEIGHT: 147.9 LBS | DIASTOLIC BLOOD PRESSURE: 91 MMHG | SYSTOLIC BLOOD PRESSURE: 154 MMHG | TEMPERATURE: 98 F | HEIGHT: 62 IN | HEART RATE: 76 BPM

## 2017-11-17 DIAGNOSIS — Z01.419 ENCOUNTER FOR GYNECOLOGICAL EXAMINATION WITHOUT ABNORMAL FINDING: Primary | ICD-10-CM

## 2017-11-17 PROCEDURE — 99396 PREV VISIT EST AGE 40-64: CPT | Performed by: OBSTETRICS & GYNECOLOGY

## 2017-11-17 NOTE — PROGRESS NOTES
Esther is a 62 year old  who presents for annual exam.   Postmenopausal since .  She is having hot flashes, mild. No vaginal bleeding noted.     Besides routine health maintenance, she has no other health concerns today.  She has been using replens and it really helps with her vaginal dryness.   She and her  went to [place], then hiked in to [place].  GYNECOLOGIC HISTORY:  Menarche: 11   Age at first intercourse: 27 Number of lifetime partners: <6  She is sexually active with male partner(s) and she is currently in monogamous relationship with .    History sexually transmitted infections:No STD history  STI testing offered?  Declined  Estrogen replacement therapy: No  JOHNNY exposure: No    History of abnormal Pap smear: YES - updated in Problem List and Health Maintenance accordingly  Family history of breast CA: Yes (Please explain): self ,sister  Family history of uterine/ovarian CA: No  Family history of colon CA: Yes (Please explain): paternal uncle    HEALTH MAINTENANCE:  Cholesterol: (No components found for: CHOL2 ) History of abnormal lipids: Yes  Mammo: 10/30/2017 . History of abnormal Mammo: Yes  Regular Self Breast Exams: Yes  Colonoscopy: 11/10/2017 History of abnormal Colonoscopy: Yes and found a polyp, check every 5 years  Dexa: 2016 History of abnormal Dexa: No  Calcium/Vitamin D intake: source:  dairy, dietary supplement(s) Adequate? Yes  TSH: (No components found for: TSH1 )  Pap; (  Lab Results   Component Value Date    PAP NIL 10/23/2015    PAP NIL 2011    PAP NIL 2010    )    HISTORY:  Obstetric History       T4      L4     SAB0   TAB0   Ectopic0   Multiple0   Live Births4       # Outcome Date GA Lbr Zack/2nd Weight Sex Delivery Anes PTL Lv   8  96 40w0d       CONCETTA   7  90 40w0d       CONCETTA   6  88 40w0d       CONCETTA   5  85 40w0d        CONCETTA   4 Term            3 Term            2 Term            1 Term                 Past Medical History:   Diagnosis Date     BCC (basal cell carcinoma of skin)      Breast cancer (H) 2015    left, invasive lobular     GERD (gastroesophageal reflux disease)      Other malignant neoplasm of skin, site unspecified      Tinnitus      Tubular adenoma of colon      Variants of migraine, not elsewhere classified, without mention of intractable migraine without mention of status migrainosus     menstrual migraines     Past Surgical History:   Procedure Laterality Date     BIOPSY NODE SENTINEL Left 12/21/2015    Procedure: BIOPSY NODE SENTINEL;  Surgeon: Francisco Gomez MD;  [facility]     COLONOSCOPY  2006     HEMORRHOIDECTOMY  5/2006     MASTECTOMY, RECONSTRUCT BREAST, COMBINED Left 12/21/2015    Procedure: COMBINED MASTECTOMY, RECONSTRUCT BREAST;  Surgeon: Francisco Gomez MD;  [facility]     MASTOPEXY Right 2/15/2016    Procedure: MASTOPEXY;  Surgeon: RADHA Friedman MD;  [facility]     RECONSTRUCT BREAST Left 12/21/2015    Procedure: RECONSTRUCT BREAST;  Surgeon: Francisco Gomez MD;  [facility]     REMOVE AND REPLACE BREAST IMPLANT PROSTHESIS Left 2/15/2016    Procedure: REMOVE AND REPLACE BREAST IMPLANT PROSTHESIS;  Surgeon: RADHA Friedman MD;  [facility]     Family History   Problem Relation Age of Onset     C.A.D. Mother      Hypertension Mother      CEREBROVASCULAR DISEASE Mother      Lipids Mother      high cholesterol     Circulatory Father      CANCER Father      stomach, skin     Breast Cancer Sister      Age 54     Hypertension Brother      Hypertension Sister      HEART DISEASE Sister      a fib     Breast Cancer Cousin      paternal     Colon Cancer Other      Pateral uncle     Social History     Social History     Marital status:      Spouse name: Yoan     Number of children: 4     Years of education: N/A     Occupational History     RN      Social History  Main Topics     Smoking status: Never Smoker     Smokeless tobacco: Not on file     Alcohol use 1.2 - 1.8 oz/week     2 - 3 Standard drinks or equivalent per week      Comment: 5 X week     Drug use: No     Sexual activity: Yes     Partners: Male     Other Topics Concern     Not on file     Social History Narrative    Caffeine intake/servings daily - 2    Calcium intake/servings daily - 3-4    Exercise 3-4 times weekly - describe walking    Sunscreen used - Yes    Seatbelts used - Yes    Guns stored in the home - Yes    Self Breast Exam - Yes    Pap test up to date -  Yes    Eye exam up to date -  Yes    Dental exam up to date -  Yes    DEXA scan up to date -  No    Flex Sig/Colonoscopy up to date -  Yes and 2005/2006?    Mammography up to date -  Yes 9/11 neg    Immunizations reviewed and up to date - No    Abuse: Current or Past (Physical, Sexual or Emotional) - No    Do you feel safe in your environment - Yes    Do you cope well with stress - Yes    Do you suffer from insomnia - No    Last updated by: Joanne Gilligan RN 11/23/11                                   Current Outpatient Prescriptions:      letrozole (FEMARA) 2.5 MG tablet, Take 1 tablet (2.5 mg) by mouth daily, Disp: 30 tablet, Rfl: 11     Vaginal Lubricant (REPLENS) GEL, Place 1 Applicatorful vaginally 2 times daily as needed, Disp: 35 g, Rfl: 3     calcium-vitamin D 500-125 MG-UNIT TABS, Take by mouth daily, Disp: , Rfl:      Ibuprofen (ADVIL PO), Take 400 mg by mouth every 6 hours as needed , Disp: , Rfl:      cetirizine (ZYRTEC) 10 MG tablet, Take 10 mg by mouth daily, Disp: , Rfl:      Allergies   Allergen Reactions     Bactrim [Sulfamethoxazole W/Trimethoprim]      Facial redness     Codeine      Dizziness and nausea     Codeine      Other reaction(s): Dizziness  Dizziness and nausea     Sulfamethoxazole-Trimethoprim      Other reaction(s): Other - Describe In Comment Field  Facial redness     Vicodin [Hydrocodone-Acetaminophen] Nausea       Past  "medical, surgical, social and family history were reviewed and updated in EPIC.    ROS:   C:       NEGATIVE for fever, chills, change in weight  I:         NEGATIVE for worrisome rashes, moles or lesions  E:       NEGATIVE for vision changes or irritation  E/M:   NEGATIVE for ear, mouth and throat problems  R:       NEGATIVE for significant cough or SOB  CV:     NEGATIVE for chest pain, palpitations or peripheral edema  GI:      NEGATIVE for nausea, abdominal pain, heartburn, or change in bowel habits  :    NEGATIVE for frequency, dysuria, hematuria, vaginal discharge, or bleeding  M:       NEGATIVE for significant arthralgias or myalgia  N:       NEGATIVE for weakness, dizziness or paresthesias  E:       NEGATIVE for temperature intolerance, skin/hair changes  P:       NEGATIVE for changes in mood or affect    EXAM:  BP (!) 154/91  Pulse 76  Temp 98  F (36.7  C) (Oral)  Ht 5' 2.21\" (1.58 m)  Wt 147 lb 14.4 oz (67.1 kg)  LMP 01/25/2007  BMI 26.87 kg/m2   BMI: Body mass index is 26.87 kg/(m^2).  Constitutional: healthy, alert and no distress  Head: Normocephalic. No masses, lesions, tenderness or abnormalities  Neck: Neck supple. Trachea midline. No adenopathy. Thyroid symmetric, normal size.   Cardiovascular: RRR.   Respiratory: Negative.   Breast: No nodularity, asymmetry or nipple discharge bilaterally.  Gastrointestinal: Abdomen soft, non-tender, non-distended. No masses, organomegaly  :  Vulva:  No external lesions, normal female hair distribution, no inguinal adenopathy.    Urethra:  Midline, non-tender, well supported, no discharge  Vagina:  Atrophic, no abnormal discharge, no lesions  Uterus:  Normal size, non-tender, freely mobile  Ovaries:  No masses appreciated, non-tender, mobile  Rectal Exam: deferred  Musculoskeletal: extremities normal  Skin: no suspicious lesions or rashes  Psychiatric: Affect appropriate, cooperative,mentation appears normal.     COUNSELING:   Reviewed preventive health " counseling, as reflected in patient instructions  Special attention given to:        Regular exercise       Healthy diet/nutrition       Osteoporosis Prevention/Bone Health       (Jessica)menopause management   reports that she has never smoked. She does not have any smokeless tobacco history on file.    Body mass index is 26.87 kg/(m^2).  Weight management plan: Discussed healthy diet and exercise guidelines and patient will follow up in 12 months in clinic to re-evaluate.    FRAX Risk Assessment  ASSESSMENT:  62 year old  with satisfactory annual exam  Plan: pap UTD, due 2018.  Labs will be done with PCP.  Mammo/breast via oncologist.  colonoscopy done.    ARTIE HILARIO MD

## 2017-11-17 NOTE — NURSING NOTE
"Chief Complaint   Patient presents with     Physical     annual        Initial BP (!) 154/91  Pulse 76  Temp 98  F (36.7  C) (Oral)  Ht 5' 2.21\" (1.58 m)  Wt 147 lb 14.4 oz (67.1 kg)  LMP 2007  BMI 26.87 kg/m2 Estimated body mass index is 26.87 kg/(m^2) as calculated from the following:    Height as of this encounter: 5' 2.21\" (1.58 m).    Weight as of this encounter: 147 lb 14.4 oz (67.1 kg).  BP completed using cuff size: regular        The following HM Due: Dexa      The following patient reported/Care Every where data was sent to:  P ABSTRACT QUALITY INITIATIVES [[id number]]       patient has appointment for today  Janice Monteiro               "

## 2017-11-17 NOTE — MR AVS SNAPSHOT
After Visit Summary   11/17/2017    Esther [place]    MRN: [id number]           Patient Information     Date Of Birth          1955        Visit Information        Provider Department      11/17/2017 4:00 PM Yana Diaz MD [facility]        Today's Diagnoses     Encounter for gynecological examination without abnormal finding    -  1       Follow-ups after your visit        Your next 10 appointments already scheduled     Dec 04, 2017  8:45 AM CST   SHORT with MARINO Fernandez CNP   [facility]    [address]  [address]  [facility] [place] [id number] [phone]            Mar 05, 2018  2:30 PM CST   DX HIP/PELVIS/SPINE with UCDX1   [facility] ([facility])    [address] [place] [address] [phone]           Please do not take any of the following 24 hours prior to the day of your exam: vitamins, calcium tablets, antacids.  If possible, please wear clothes without metal (snaps, zippers). A sweatsuit works well.            Mar 05, 2018  3:45 PM CST   (Arrive by 3:30 PM)   Return Visit with Genevieve Rojas MD   [facility] ([facility])    [address] [place] [address] [phone]              Who to contact     If you have questions or need follow up information about today's clinic visit or your schedule please contact [facility] [place] [place] directly at [phone].  Normal or non-critical lab and imaging results will be communicated to you by MyChart, letter or phone within 4 business days after the clinic has received the results. If you do not hear from us within 7 days, please contact the clinic through MyChart or phone. If you have a critical or abnormal lab result, we will notify you by phone as soon as possible.  Submit refill requests through  "MyChart or call your pharmacy and they will forward the refill request to us. Please allow 3 business days for your refill to be completed.          Additional Information About Your Visit        MyChart Information     [employer] gives you secure access to your electronic health record. If you see a primary care provider, you can also send messages to your care team and make appointments. If you have questions, please call your primary care clinic.  If you do not have a primary care provider, please call [phone] and they will assist you.        Care EveryWhere ID     This is your Care EveryWhere ID. This could be used by other organizations to access your [facility] medical records  [id number]        Your Vitals Were     Pulse Temperature Height Last Period BMI (Body Mass Index)       76 98  F (36.7  C) (Oral) 5' 2.21\" (1.58 m) 01/25/2007 26.87 kg/m2        Blood Pressure from Last 3 Encounters:   11/17/17 (!) 154/91   11/10/17 (!) 178/96   11/07/17 (!) 170/92    Weight from Last 3 Encounters:   11/17/17 147 lb 14.4 oz (67.1 kg)   11/10/17 146 lb (66.2 kg)   11/07/17 146 lb 9.6 oz (66.5 kg)              Today, you had the following     No orders found for display       Primary Care Provider Office Phone # Fax #    Yana Diaz [phone]       [address]  [facility] [address]        Equal Access to Services     [facility] AH: Hadii aad ku hadasho Soomaali, waaxda luqadaha, qaybta kaalmada adeegyada, waxay maryam calvert . So [facility] [phone].    ATENCIÓN: Si habla español, tiene a neal disposición servicios gratuitos de asistencia lingüística. Llame al [phone].    We comply with applicable federal civil rights laws and [place] laws. We do not discriminate on the basis of race, color, national origin, age, disability, sex, sexual orientation, or gender identity.            Thank you!     Thank you for choosing [facility] [place] [facility]  for your care. " Our goal is always to provide you with excellent care. Hearing back from our patients is one way we can continue to improve our services. Please take a few minutes to complete the written survey that you may receive in the mail after your visit with us. Thank you!             Your Updated Medication List - Protect others around you: Learn how to safely use, store and throw away your medicines at www.disposemymeds.org.          This list is accurate as of: 11/17/17  4:54 PM.  Always use your most recent med list.                   Brand Name Dispense Instructions for use Diagnosis    ADVIL PO      Take 400 mg by mouth every 6 hours as needed        calcium-vitamin D 500-125 MG-UNIT Tabs      Take by mouth daily        cetirizine 10 MG tablet    zyrTEC     Take 10 mg by mouth daily        letrozole 2.5 MG tablet    FEMARA    30 tablet    Take 1 tablet (2.5 mg) by mouth daily    Malignant neoplasm of overlapping sites of left breast in female, estrogen receptor positive (H)       replens Gel     35 g    Place 1 Applicatorful vaginally 2 times daily as needed    Vaginal atrophy

## 2017-12-04 ENCOUNTER — OFFICE VISIT (OUTPATIENT)
Dept: FAMILY MEDICINE | Facility: CLINIC | Age: 62
End: 2017-12-04
Payer: COMMERCIAL

## 2017-12-04 VITALS — SYSTOLIC BLOOD PRESSURE: 160 MMHG | TEMPERATURE: 98.6 F | DIASTOLIC BLOOD PRESSURE: 90 MMHG

## 2017-12-04 DIAGNOSIS — Z23 NEED FOR VACCINATION: ICD-10-CM

## 2017-12-04 DIAGNOSIS — Z71.84 TRAVEL ADVICE ENCOUNTER: Primary | ICD-10-CM

## 2017-12-04 PROCEDURE — 90471 IMMUNIZATION ADMIN: CPT | Mod: GA | Performed by: NURSE PRACTITIONER

## 2017-12-04 PROCEDURE — 90691 TYPHOID VACCINE IM: CPT | Mod: GA | Performed by: NURSE PRACTITIONER

## 2017-12-04 PROCEDURE — 99402 PREV MED CNSL INDIV APPRX 30: CPT | Mod: 25 | Performed by: NURSE PRACTITIONER

## 2017-12-04 PROCEDURE — 90715 TDAP VACCINE 7 YRS/> IM: CPT | Mod: GA | Performed by: NURSE PRACTITIONER

## 2017-12-04 PROCEDURE — 90472 IMMUNIZATION ADMIN EACH ADD: CPT | Mod: GA | Performed by: NURSE PRACTITIONER

## 2017-12-04 PROCEDURE — 90632 HEPA VACCINE ADULT IM: CPT | Mod: GA | Performed by: NURSE PRACTITIONER

## 2017-12-04 RX ORDER — AZITHROMYCIN 500 MG/1
500 TABLET, FILM COATED ORAL DAILY
Qty: 6 TABLET | Refills: 0 | Status: SHIPPED | OUTPATIENT
Start: 2017-12-04 | End: 2017-12-07

## 2017-12-04 RX ORDER — ATOVAQUONE AND PROGUANIL HYDROCHLORIDE 250; 100 MG/1; MG/1
1 TABLET, FILM COATED ORAL DAILY
Qty: 25 TABLET | Refills: 0 | Status: SHIPPED | OUTPATIENT
Start: 2017-12-04 | End: 2018-03-05

## 2017-12-04 RX ORDER — ACETAZOLAMIDE 125 MG/1
TABLET ORAL
Qty: 25 TABLET | Refills: 0 | Status: SHIPPED | OUTPATIENT
Start: 2017-12-04 | End: 2018-03-05

## 2017-12-04 NOTE — MR AVS SNAPSHOT
After Visit Summary   12/4/2017    Esther [place]    MRN: [id number]           Patient Information     Date Of Birth          1955        Visit Information        Provider Department      12/4/2017 8:45 AM Lisa Cobb APRN CNP [facility]        Today's Diagnoses     Travel advice encounter    -  1    Need for vaccination          Care Instructions    Today December 4, 2017 you received the    Hepatitis A Vaccine - Please return on 6/2/18 or later for your 2nd and final dose.    Tetanus (Tdap) Vaccine    Typhoid - injectable. This vaccine is valid for two years.   .    These appointments can be made as a NURSE ONLY visit.    **It is very important for the vaccinations to be given on the scheduled day(s), this helps ensure you receive the full effectiveness of the vaccine.**    Please call [facility] with any questions [phone]    Thank you for visiting [facility]              Follow-ups after your visit        Your next 10 appointments already scheduled     Mar 05, 2018  2:30 PM CST   DX HIP/PELVIS/SPINE with UCDX1   [facility] ([facility])    [address] [place] [address] [phone]           Please do not take any of the following 24 hours prior to the day of your exam: vitamins, calcium tablets, antacids.  If possible, please wear clothes without metal (snaps, zippers). A sweatsuit works well.            Mar 05, 2018  3:45 PM CST   (Arrive by 3:30 PM)   Return Visit with Genevieve Rojas MD   [facility] ([facility])    [address] [place] [address] [phone]              Who to contact     If you have questions or need follow up information about today's clinic visit or your schedule please contact [facility] directly at [phone].  Normal or  non-critical lab and imaging results will be communicated to you by MyChart, letter or phone within 4 business days after the clinic has received the results. If you do not hear from us within 7 days, please contact the clinic through [employer] or phone. If you have a critical or abnormal lab result, we will notify you by phone as soon as possible.  Submit refill requests through [employer] or call your pharmacy and they will forward the refill request to us. Please allow 3 business days for your refill to be completed.          Additional Information About Your Visit        [employer] Information     [employer] gives you secure access to your electronic health record. If you see a primary care provider, you can also send messages to your care team and make appointments. If you have questions, please call your primary care clinic.  If you do not have a primary care provider, please call [phone] and they will assist you.        Care EveryWhere ID     This is your Care EveryWhere ID. This could be used by other organizations to access your [facility] medical records  [id number]        Your Vitals Were     Temperature Last Period                98.6  F (37  C) (Oral) 01/25/2007           Blood Pressure from Last 3 Encounters:   12/04/17 160/90   11/17/17 (!) 154/91   11/10/17 (!) 178/96    Weight from Last 3 Encounters:   11/17/17 147 lb 14.4 oz (67.1 kg)   11/10/17 146 lb (66.2 kg)   11/07/17 146 lb 9.6 oz (66.5 kg)              We Performed the Following     HEPA VACCINE ADULT IM     TDAP VACCINE (ADACEL)     TYPHOID VACCINE, IM          Today's Medication Changes          These changes are accurate as of: 12/4/17  9:32 AM.  If you have any questions, ask your nurse or doctor.               Start taking these medicines.        Dose/Directions    acetaZOLAMIDE 125 MG tablet   Commonly known as:  DIAMOX   Used for:  Travel advice encounter   Started by:  Lisa Cobb APRN CNP        To prevent altitude illness: Take  one tab every 12 hours starting 24 hours prior to ascent and continue for 24 hours while at the highest   Quantity:  25 tablet   Refills:  0       atovaquone-proguanil 250-100 MG per tablet   Commonly known as:  MALARONE   Used for:  Travel advice encounter   Started by:  Lisa Cobb APRN CNP        Dose:  1 tablet   Take 1 tablet by mouth daily Start 2 days before exposure to Malaria and continue daily till  7 days after exposure.   Quantity:  25 tablet   Refills:  0       azithromycin 500 MG tablet   Commonly known as:  ZITHROMAX   Used for:  Travel advice encounter   Started by:  Lisa Cobb APRN CNP        Dose:  500 mg   Take 1 tablet (500 mg) by mouth daily for 3 doses Take 1 tablet a day for up to 3 days for severe diarrhea   Quantity:  6 tablet   Refills:  0            Where to get your medicines      These medications were sent to [id number] - [address] AT [facility] [address]  [address] [facility], [place] [address]     Phone:  [phone]     acetaZOLAMIDE 125 MG tablet    atovaquone-proguanil 250-100 MG per tablet    azithromycin 500 MG tablet                Primary Care Provider Office Phone # Fax #    Yana Diaz [phone]       [address] [facility] [address]  [facility] [address]        Equal Access to Services     [facility] AH: Hadii priscilla ku hadasho Soomaali, waaxda luqadaha, qaybta kaalmada adeegyada, epe francisco hayradha calvert . So [facility] [phone].    ATENCIÓN: Si habla español, tiene a neal disposición servicios gratuitos de asistencia lingüística. Zacame al [phone].    We comply with applicable federal civil rights laws and [place] laws. We do not discriminate on the basis of race, color, national origin, age, disability, sex, sexual orientation, or gender identity.            Thank you!     Thank you for choosing [facility]  for your care. Our goal is always to provide you  with excellent care. Hearing back from our patients is one way we can continue to improve our services. Please take a few minutes to complete the written survey that you may receive in the mail after your visit with us. Thank you!             Your Updated Medication List - Protect others around you: Learn how to safely use, store and throw away your medicines at www.disposemymeds.org.          This list is accurate as of: 12/4/17  9:32 AM.  Always use your most recent med list.                   Brand Name Dispense Instructions for use Diagnosis    acetaZOLAMIDE 125 MG tablet    DIAMOX    25 tablet    To prevent altitude illness: Take one tab every 12 hours starting 24 hours prior to ascent and continue for 24 hours while at the highest    Travel advice encounter       ADVIL PO      Take 400 mg by mouth every 6 hours as needed        atovaquone-proguanil 250-100 MG per tablet    MALARONE    25 tablet    Take 1 tablet by mouth daily Start 2 days before exposure to Malaria and continue daily till  7 days after exposure.    Travel advice encounter       azithromycin 500 MG tablet    ZITHROMAX    6 tablet    Take 1 tablet (500 mg) by mouth daily for 3 doses Take 1 tablet a day for up to 3 days for severe diarrhea    Travel advice encounter       calcium-vitamin D 500-125 MG-UNIT Tabs      Take by mouth daily        cetirizine 10 MG tablet    zyrTEC     Take 10 mg by mouth daily        letrozole 2.5 MG tablet    FEMARA    30 tablet    Take 1 tablet (2.5 mg) by mouth daily    Malignant neoplasm of overlapping sites of left breast in female, estrogen receptor positive (H)       replens Gel     35 g    Place 1 Applicatorful vaginally 2 times daily as needed    Vaginal atrophy

## 2017-12-04 NOTE — NURSING NOTE
"Chief Complaint   Patient presents with     Travel Clinic     initial /90  Temp 98.6  F (37  C) (Oral)  LMP 01/25/2007 Estimated body mass index is 26.87 kg/(m^2) as calculated from the following:    Height as of 11/17/17: 5' 2.21\" (1.58 m).    Weight as of 11/17/17: 147 lb 14.4 oz (67.1 kg).  BP completed using cuff size: regular.   R arm      Health Maintenance that is potentially due pending provider review:  NONE    n/a    Brandon Muse ma  "

## 2017-12-04 NOTE — PROGRESS NOTES
Nurse Note      Itinerary:  [facility]      Departure Date: 1/27/18      Return Date: 2/11/18      Length of Trip 2 weeks      Reason for Travel: Tourism           Urban or rural: both      Accommodations: Hotel    Camping        IMMUNIZATION HISTORY  Have you received any immunizations within the past 4 weeks?  No  Have you ever fainted from having your blood drawn or from an injection?  No  Have you ever had a fever reaction to vaccination?  No  Have you ever had any bad reaction or side effect from any vaccination?  No  Have you ever had hepatitis A or B vaccine?  Yes  Do you live (or work closely) with anyone who has AIDS, an AIDS-like condition, any other immune disorder or who is on chemotherapy for cancer?  No  Do you have a family history of immunodeficiency?  No  Have you received any injection of immune globulin or any blood products during the past 12 months?  No    Patient roomed by Brandon Aaron  Esther Vegas is a 62 year old female seen today with spouse for counsultation for international travel to [facility] for Tourism.  Patient will be departing in  7 week(s) and staying for   2 week(s) and  traveling with spouse  with organized tour group.  ([employer])    Patient itinerary :  will be arrive in  [facility] then [facility] > [facility] in [facility] region [facility] which presents risk for Malaria, Dengue Fever, Chikungungya, Zika,  Trypanosomiasis, Schistosomiasis, Rabies, food borne illnesses, motor vehicle accidents, Typhoid, Leishmaniasis and Lassa Fever. exposure.      Patient's activities will include sightseeing, safari/game sears, camping, hiking [facility] and high altitude exposure.    Patient's country of birth is [place]    Special medical concerns: none  Pre-travel questionnaire was completed by patient and reviewed by provider.     Vitals: /90  Temp 98.6  F (37  C) (Oral)  LMP 01/25/2007  Patient monitors her BP at home and it is consistently normal.    BMI= There is no height or weight on file to calculate BMI.    EXAM:  General:  Well-nourished, well-developed in no acute distress.  Appears to be stated age, interacts appropriately and expresses understanding of information given to patient.    Current Outpatient Prescriptions   Medication Sig Dispense Refill     letrozole (FEMARA) 2.5 MG tablet Take 1 tablet (2.5 mg) by mouth daily 30 tablet 11     Vaginal Lubricant (REPLENS) GEL Place 1 Applicatorful vaginally 2 times daily as needed 35 g 3     calcium-vitamin D 500-125 MG-UNIT TABS Take by mouth daily       Ibuprofen (ADVIL PO) Take 400 mg by mouth every 6 hours as needed        cetirizine (ZYRTEC) 10 MG tablet Take 10 mg by mouth daily       Patient Active Problem List   Diagnosis     Migraine variant     Other malignant neoplasm of skin, site unspecified     CARDIOVASCULAR SCREENING; LDL GOAL LESS THAN 160     Vaginal atrophy     Malignant neoplasm of left breast (H)     HX: breast cancer     S/P breast reconstruction, left     Tubular adenoma of colon     Allergies   Allergen Reactions     Bactrim [Sulfamethoxazole W/Trimethoprim]      Facial redness     Codeine      Dizziness and nausea     Codeine      Other reaction(s): Dizziness  Dizziness and nausea     Sulfamethoxazole-Trimethoprim      Other reaction(s): Other - Describe In Comment Field  Facial redness     Vicodin [Hydrocodone-Acetaminophen] Nausea         Immunizations discussed include:   Hepatitis A:  Ordered/given today, risks, benefits and side effects reviewed  Hepatitis B: Up to date  Influenza: Up to date  Typhoid: Ordered/given today, risks, benefits and side effects reviewed  Rabies: Declined  reviewed managment of a animal bite or scratch (washing wound, seek medical care within 24 hours for post exposure prophylaxis )  Yellow Fever: Not indicated  Japanese Encephalitis: Not indicated  Meningococcus: Not indicated  Tetanus/Diphtheria: Ordered/given today, risks, benefits and side  effects reviewed  Measles/Mumps/Rubella: Up to date  (Nurse)  Cholera: Not needed  Polio: Up to date  Pneumococcal: Under age of 65  Varicella: Immune by disease history per patient report  Zostavax:  deferred  HPV:  Not indicated  TB:  Low risk    Altitude Exposure on this trip: YES  Past tolerance to Altitude: only has been as high as about 8,000 overnight    ASSESSMENT/PLAN:    ICD-10-CM    1. Travel advice encounter Z71.89 HEPA VACCINE ADULT IM     TYPHOID VACCINE, IM     TDAP VACCINE (ADACEL)     atovaquone-proguanil (MALARONE) 250-100 MG per tablet     azithromycin (ZITHROMAX) 500 MG tablet     acetaZOLAMIDE (DIAMOX) 125 MG tablet   2. Need for vaccination Z23 HEPA VACCINE ADULT IM     TYPHOID VACCINE, IM     TDAP VACCINE (ADACEL)     I have reviewed general recommendations for safe travel   including: food/water precautions, insect precautions,   roadway safety. Educational materials and [employer] report provided.    Malaraia prophylaxis recommended: Malarone  Symptomatic treatment for traveler's diarrhea: azithromycin  Altitude illness prevention and treatment: diamox given  (patient will trial the diamox prior to travel due to mild allergy to Sulfa)  Additionally, the couple is planning a preacclimitzation trip to [place] .       Evacuation insurance advised and resources were provided to patient.    Total visit time 30 minutes  with over 50% of time spent counseling patient as detailed above.    Lisa Cobb CNP

## 2017-12-04 NOTE — PATIENT INSTRUCTIONS
Today December 4, 2017 you received the    Hepatitis A Vaccine - Please return on 6/2/18 or later for your 2nd and final dose.    Tetanus (Tdap) Vaccine    Typhoid - injectable. This vaccine is valid for two years.   .    These appointments can be made as a NURSE ONLY visit.    **It is very important for the vaccinations to be given on the scheduled day(s), this helps ensure you receive the full effectiveness of the vaccine.**    Please call [facility] with any questions [phone]    Thank you for visiting [facility]

## 2018-03-05 ENCOUNTER — ONCOLOGY VISIT (OUTPATIENT)
Dept: ONCOLOGY | Facility: CLINIC | Age: 63
End: 2018-03-05
Attending: INTERNAL MEDICINE
Payer: COMMERCIAL

## 2018-03-05 ENCOUNTER — RADIANT APPOINTMENT (OUTPATIENT)
Dept: BONE DENSITY | Facility: CLINIC | Age: 63
End: 2018-03-05
Attending: INTERNAL MEDICINE
Payer: COMMERCIAL

## 2018-03-05 VITALS
DIASTOLIC BLOOD PRESSURE: 85 MMHG | RESPIRATION RATE: 16 BRPM | HEART RATE: 87 BPM | BODY MASS INDEX: 27.23 KG/M2 | HEIGHT: 62 IN | OXYGEN SATURATION: 98 % | WEIGHT: 148 LBS | SYSTOLIC BLOOD PRESSURE: 155 MMHG

## 2018-03-05 DIAGNOSIS — S49.92XA: ICD-10-CM

## 2018-03-05 DIAGNOSIS — I89.0 LYMPHEDEMA: ICD-10-CM

## 2018-03-05 DIAGNOSIS — C50.812 MALIGNANT NEOPLASM OF OVERLAPPING SITES OF LEFT BREAST IN FEMALE, ESTROGEN RECEPTOR POSITIVE (H): Primary | ICD-10-CM

## 2018-03-05 DIAGNOSIS — M89.9 DISORDER OF BONE AND CARTILAGE: ICD-10-CM

## 2018-03-05 DIAGNOSIS — M94.9 DISORDER OF BONE AND CARTILAGE: ICD-10-CM

## 2018-03-05 DIAGNOSIS — Z79.811 AROMATASE INHIBITOR USE: ICD-10-CM

## 2018-03-05 DIAGNOSIS — Z17.0 MALIGNANT NEOPLASM OF OVERLAPPING SITES OF LEFT BREAST IN FEMALE, ESTROGEN RECEPTOR POSITIVE (H): Primary | ICD-10-CM

## 2018-03-05 PROCEDURE — 99214 OFFICE O/P EST MOD 30 MIN: CPT | Mod: ZP | Performed by: INTERNAL MEDICINE

## 2018-03-05 PROCEDURE — G0463 HOSPITAL OUTPT CLINIC VISIT: HCPCS | Mod: ZF

## 2018-03-05 ASSESSMENT — PAIN SCALES - GENERAL: PAINLEVEL: NO PAIN (0)

## 2018-03-05 NOTE — PROGRESS NOTES
MEDICAL ONCOLOGY FOLLOW-UP PATIENT VISIT     NAME: Esther Vegas     DATE: 3/5/2018    PRIMARY CARE PHYSICIAN: Yana Diaz    PATIENT ID: Grade I, stage IIb, T3N0M0, ER positive, VA positive, HER2 non-amplified invasive lobular carcinoma of the left breast. Oncotype DX recurrence score = 11.     Oncology History: Ms. Vegas is a 62 year old female with invasive lobular cancer of the left breast. Routine screening mammogram on 10/23/15 demonstrated a spiculated lesion in the upper outer left breast. Previous mammogram was 2.5 years prior. Ultrasound showed a 0.6 cm mass with indistinct margins at the 1:30 position, 3 cm from the nipple and a second 0.6 cm mass at the 1:00 position, 5 cm from the nipple. Contrast enhanced mammogram then showed non-masslike clumped enhancement in a segmental distribution from the 1 to 3:00 position of the left breast measuring 7.5 cm. Biopsy of the mass at the 1:00 position showed a grade 1 invasive lobular carcinoma and classical type LCIS. Estrogen receptor staining was strongly positive in >95% of tumor cell nuclei. Progesterone receptor staining was moderately positive in 60% of tumor cell nuclei. HER2 was nonamplified by FISH with a HER2 to SAVAGE-17 ratio of 1.2.     Left axillary sentinel lymph node biopsy and left nipple-sparing mastectomy with immediate reconstruction was performed on 12/21/15. Pathology showed invasive lobular carcinoma, Damari grade 2, involving the upper outer quadrant, central breast and lower inner quadrant, size 5.4 cm. There was LCIS, classical type. Surgical margins were negative. A single sentinel LN was negative. [id number] (stage IIB). Tumor was sent for oncotype dx testing which returned with a recurrence score of 11. A recurrence score of 11 correlates with an 8% risk of distant recurrence in 10 years after treatment with tamoxifen alone.  Adjuvant postmastectomy chest wall radiation was not recommended based on lack of high risk  features.  She has been on adjuvant letrozole since 1/13/16.    Interim History:   Ms. Vegas comes in to clinic today for routine breast cancer followup.  Since our last visit, around mid January, she and her  went on a trip to hike [facility].  Unfortunately, she developed altitude sickness and therefore had to descend on day 4 rather than the planned day 8.  Her altitude sickness was characterized by cough, fevers and rigors.  The cough resolved as soon as she descended.  The fevers and chills lasted a bit longer.  Fortunately, she was still able to enjoy her time in [facility].  She is currently without concerns or complaints.  She specifically denies right breast masses or lumps or masses overlying her left implant.  She continues on treatment with letrozole.  She has intermittent hot flashes.  They are not bothersome enough for her to take medicine for them.  She denies vaginal symptoms.  She has no symptoms of depression or anxiety.  She does continue to have joint pains.  Most recently she has had pain in the mid lateral left foot.  She has noted a slight bump in this area that is tender to palpation.  Despite having the pain in the foot, she has been running on her treadmill.  She believes the pain is likely an overuse injury.  She has no current cough, shortness of breath or chest pain.  No abdominal complaints.  No headaches, visual changes or focal neurologic complaints.  The remainder of a 10-point review of systems is otherwise negative.     PAST MEDICAL HISTORY:   Past Medical History:   Diagnosis Date     BCC (basal cell carcinoma of skin)      Breast cancer (H) 2015    left, invasive lobular     GERD (gastroesophageal reflux disease)      Other malignant neoplasm of skin, site unspecified      Tinnitus      Tubular adenoma of colon      Variants of migraine, not elsewhere classified, without mention of intractable migraine without mention of status migrainosus     menstrual migraines  "      PAST SURGICAL HISTORY:  Past Surgical History:   Procedure Laterality Date     BIOPSY NODE SENTINEL Left 12/21/2015    Procedure: BIOPSY NODE SENTINEL;  Surgeon: Francisco Gomez MD;  Location: [facility]     COLONOSCOPY  2006     HEMORRHOIDECTOMY  5/2006     MASTECTOMY, RECONSTRUCT BREAST, COMBINED Left 12/21/2015    Procedure: COMBINED MASTECTOMY, RECONSTRUCT BREAST;  Surgeon: Francisco Gomez MD;  Location: [facility]     MASTOPEXY Right 2/15/2016    Procedure: MASTOPEXY;  Surgeon: RADHA Friedman MD;  Location: [facility]     RECONSTRUCT BREAST Left 12/21/2015    Procedure: RECONSTRUCT BREAST;  Surgeon: Francisco Gomez MD;  Location: [facility]     REMOVE AND REPLACE BREAST IMPLANT PROSTHESIS Left 2/15/2016    Procedure: REMOVE AND REPLACE BREAST IMPLANT PROSTHESIS;  Surgeon: RADHA Friedman MD;  Location: [facility]       MEDS:  Current Outpatient Prescriptions   Medication     atovaquone-proguanil (MALARONE) 250-100 MG per tablet     acetaZOLAMIDE (DIAMOX) 125 MG tablet     letrozole (FEMARA) 2.5 MG tablet     Vaginal Lubricant (REPLENS) GEL     calcium-vitamin D 500-125 MG-UNIT TABS     Ibuprofen (ADVIL PO)     cetirizine (ZYRTEC) 10 MG tablet     No current facility-administered medications for this visit.        ALLERGIES:  Allergies   Allergen Reactions     Bactrim [Sulfamethoxazole W/Trimethoprim]      Facial redness     Codeine      Dizziness and nausea     Codeine      Other reaction(s): Dizziness  Dizziness and nausea     Sulfamethoxazole-Trimethoprim      Other reaction(s): Other - Describe In Comment Field  Facial redness     Vicodin [Hydrocodone-Acetaminophen] Nausea        PHYSICAL EXAM:  /85 (BP Location: Right arm, Patient Position: Chair, Cuff Size: Adult Regular)  Pulse 87  Resp 16  Ht 1.58 m (5' 2.21\")  Wt 67.1 kg (148 lb)  LMP 01/25/2007  SpO2 98%  BMI 26.89 kg/m2  LMP 01/25/2007  KPS: 100%  GENERAL: A&Ox3, well nourished, well appearing adult female in no acute distress  HEENT: " Normocephalic, atraumatic.  MMM.  No lesions of the oropharynx  LYMPH: No palpable cervical, supraclavicular, or axillary lymphadenopathy  CV: RRR, normal S1/S2, No murmurs, gallops, or rubs  BREAST:  Left breast mastectomy with reconstruction.  Left axillary cord with slight deviation of the nipple laterally.  There are no dominant masses palpable in the right breast or overlying the left implant.  Right nipple is everted.    LUNGS: CTA B/L, no wheezing or rhonchi  ABDOMEN: soft, nontender, non-distended, no hepatosplenomegaly. Bowel sounds present.  EXTREMITIES: no lower extremity edema  NEURO: Cranial nerves II-XII grossly intact  INTEGUMENT: No rashes    LABS REVIEWED THIS VISIT:  3/5/18 DEXA bone density scan:  Lowest T-score = -0.4    IMPRESSION/PLAN: 63 yo female with stage IIb, T3N0M0, grade I, ER positive, MD positive, HER2 negative invasive lobular carcinoma of the left breast. She is s/p treatment with left breast mastectomy and has been on letrozole since 01/13/2016.    1.  Left breast estrogen receptor-positive cancer:  Ms. Vegas is approximately 2 years 2 months out from excision of her left-sided breast cancer.  She continues on letrozole.  She is tolerating treatment well.  We are planning on treating her for a total of 10 years.  There is no evidence of disease recurrence on my clinical breast exam performed today.  I will see her back in clinic in 4 months.  Next screening mammogram of the right breast will be due in 10/2016.    2.  Bone health:  She is at increased risk of osteoporosis on letrozole.  DEXA bone density scan in 01/2016 showed normal bone density.  DEXA today shows a lowest T-score of -0.4.  Continue to take calcium 1200 mg and vitamin D 1000 IU daily as well as walk a half hour daily.      3.  Hot Flashes:  She declines medical intervention at this time.      4.  Left lateral foot pain:  Likely overuse injury vs stress fracture.  Advised rest/refraining from activities that  exacerbate pain.  Contact the clinic if persistent pain in the upcoming weeks at which time I would recommend further evaluation with an X-ray.    5.  Left axillary cord:  Referral to lymphedema clinic placed today.      6.  Followup:  Return to clinic in 4 months for visit with me.     It was a pleasure to see Ms. Vegas in clinic today.  A total of 20 minutes of our 25 minute face-to-face visit was spent counseling the patient.

## 2018-03-05 NOTE — NURSING NOTE
"Oncology Rooming Note    March 5, 2018 3:37 PM   Esther Vegas is a 62 year old female who presents for:    Chief Complaint   Patient presents with     Oncology Clinic Visit     Return: Breast ca      Initial Vitals: /85 (BP Location: Right arm, Patient Position: Chair, Cuff Size: Adult Regular)  Pulse 87  Resp 16  Ht 1.58 m (5' 2.21\")  Wt 67.1 kg (148 lb)  LMP 01/25/2007  SpO2 98%  BMI 26.89 kg/m2 Estimated body mass index is 26.89 kg/(m^2) as calculated from the following:    Height as of this encounter: 1.58 m (5' 2.21\").    Weight as of this encounter: 67.1 kg (148 lb). Body surface area is 1.72 meters squared.  No Pain (0) Comment: Data Unavailable   Patient's last menstrual period was 01/25/2007.  Allergies reviewed: YES  Medications reviewed: YES    Medications: Medication refills not needed today.  Pharmacy name entered into [facility]:    [facility] [id number] [place] [address] [facility] [address] [place] [address] AT [facility] [address]    Clinical concerns: no new concerns.  Pt received flu shot elsewhere. See Immunizations       6 minutes for nursing intake (face to face time)     Janice Richey CMA                "

## 2018-03-05 NOTE — LETTER
3/5/2018       RE: Esther Vegas  [address] [facility] [address]     Dear Colleague,    Thank you for referring your patient, Esther Vegas, to the [facility]. Please see a copy of my visit note below.    MEDICAL ONCOLOGY FOLLOW-UP PATIENT VISIT     NAME: Esther Vegas     DATE: 3/5/2018    PRIMARY CARE PHYSICIAN: Yana Diaz    PATIENT ID: Grade I, stage IIb, T3N0M0, ER positive, IN positive, HER2 non-amplified invasive lobular carcinoma of the left breast. Oncotype DX recurrence score = 11.     Oncology History: Ms. Vegas is a 62 year old female with invasive lobular cancer of the left breast. Routine screening mammogram on 10/23/15 demonstrated a spiculated lesion in the upper outer left breast. Previous mammogram was 2.5 years prior. Ultrasound showed a 0.6 cm mass with indistinct margins at the 1:30 position, 3 cm from the nipple and a second 0.6 cm mass at the 1:00 position, 5 cm from the nipple. Contrast enhanced mammogram then showed non-masslike clumped enhancement in a segmental distribution from the 1 to 3:00 position of the left breast measuring 7.5 cm. Biopsy of the mass at the 1:00 position showed a grade 1 invasive lobular carcinoma and classical type LCIS. Estrogen receptor staining was strongly positive in >95% of tumor cell nuclei. Progesterone receptor staining was moderately positive in 60% of tumor cell nuclei. HER2 was nonamplified by FISH with a HER2 to SAVAGE-17 ratio of 1.2.     Left axillary sentinel lymph node biopsy and left nipple-sparing mastectomy with immediate reconstruction was performed on 12/21/15. Pathology showed invasive lobular carcinoma, Damari grade 2, involving the upper outer quadrant, central breast and lower inner quadrant, size 5.4 cm. There was LCIS, classical type. Surgical margins were negative. A single sentinel LN was negative. [id number] (stage IIB). Tumor was sent for oncotype dx testing which returned with a  recurrence score of 11. A recurrence score of 11 correlates with an 8% risk of distant recurrence in 10 years after treatment with tamoxifen alone.  Adjuvant postmastectomy chest wall radiation was not recommended based on lack of high risk features.  She has been on adjuvant letrozole since 1/13/16.    Interim History:   Ms. Vegas comes in to clinic today for routine breast cancer followup.  Since our last visit, around mid January, she and her  went on a trip to hike [facility].  Unfortunately, she developed altitude sickness and therefore had to descend on day 4 rather than the planned day 8.  Her altitude sickness was characterized by cough, fevers and rigors.  The cough resolved as soon as she descended.  The fevers and chills lasted a bit longer.  Fortunately, she was still able to enjoy her time in [facility].  She is currently without concerns or complaints.  She specifically denies right breast masses or lumps or masses overlying her left implant.  She continues on treatment with letrozole.  She has intermittent hot flashes.  They are not bothersome enough for her to take medicine for them.  She denies vaginal symptoms.  She has no symptoms of depression or anxiety.  She does continue to have joint pains.  Most recently she has had pain in the mid lateral left foot.  She has noted a slight bump in this area that is tender to palpation.  Despite having the pain in the foot, she has been running on her treadmill.  She believes the pain is likely an overuse injury.  She has no current cough, shortness of breath or chest pain.  No abdominal complaints.  No headaches, visual changes or focal neurologic complaints.  The remainder of a 10-point review of systems is otherwise negative.     PAST MEDICAL HISTORY:   Past Medical History:   Diagnosis Date     BCC (basal cell carcinoma of skin)      Breast cancer (H) 2015    left, invasive lobular     GERD (gastroesophageal reflux disease)      Other  malignant neoplasm of skin, site unspecified      Tinnitus      Tubular adenoma of colon      Variants of migraine, not elsewhere classified, without mention of intractable migraine without mention of status migrainosus     menstrual migraines       PAST SURGICAL HISTORY:  Past Surgical History:   Procedure Laterality Date     BIOPSY NODE SENTINEL Left 12/21/2015    Procedure: BIOPSY NODE SENTINEL;  Surgeon: Francisco Gomez MD;  Location: [facility]     COLONOSCOPY  2006     HEMORRHOIDECTOMY  5/2006     MASTECTOMY, RECONSTRUCT BREAST, COMBINED Left 12/21/2015    Procedure: COMBINED MASTECTOMY, RECONSTRUCT BREAST;  Surgeon: Francisco Gomez MD;  Location: [facility]     MASTOPEXY Right 2/15/2016    Procedure: MASTOPEXY;  Surgeon: RADHA Friedman MD;  Location: [facility]     RECONSTRUCT BREAST Left 12/21/2015    Procedure: RECONSTRUCT BREAST;  Surgeon: Francisco Gomez MD;  Location[facility]     REMOVE AND REPLACE BREAST IMPLANT PROSTHESIS Left 2/15/2016    Procedure: REMOVE AND REPLACE BREAST IMPLANT PROSTHESIS;  Surgeon: RADHA Friedman MD;  Location: [facility]       MEDS:  Current Outpatient Prescriptions   Medication     atovaquone-proguanil (MALARONE) 250-100 MG per tablet     acetaZOLAMIDE (DIAMOX) 125 MG tablet     letrozole (FEMARA) 2.5 MG tablet     Vaginal Lubricant (REPLENS) GEL     calcium-vitamin D 500-125 MG-UNIT TABS     Ibuprofen (ADVIL PO)     cetirizine (ZYRTEC) 10 MG tablet     No current facility-administered medications for this visit.        ALLERGIES:  Allergies   Allergen Reactions     Bactrim [Sulfamethoxazole W/Trimethoprim]      Facial redness     Codeine      Dizziness and nausea     Codeine      Other reaction(s): Dizziness  Dizziness and nausea     Sulfamethoxazole-Trimethoprim      Other reaction(s): Other - Describe In Comment Field  Facial redness     Vicodin [Hydrocodone-Acetaminophen] Nausea        PHYSICAL EXAM:  /85 (BP Location: Right arm, Patient Position: Chair, Cuff Size: Adult  "Regular)  Pulse 87  Resp 16  Ht 1.58 m (5' 2.21\")  Wt 67.1 kg (148 lb)  LMP 01/25/2007  SpO2 98%  BMI 26.89 kg/m2  LMP 01/25/2007  KPS: 100%  GENERAL: A&Ox3, well nourished, well appearing adult female in no acute distress  HEENT: Normocephalic, atraumatic.  MMM.  No lesions of the oropharynx  LYMPH: No palpable cervical, supraclavicular, or axillary lymphadenopathy  CV: RRR, normal S1/S2, No murmurs, gallops, or rubs  BREAST:  Left breast mastectomy with reconstruction.  Left axillary cord with slight deviation of the nipple laterally.  There are no dominant masses palpable in the right breast or overlying the left implant.  Right nipple is everted.    LUNGS: CTA B/L, no wheezing or rhonchi  ABDOMEN: soft, nontender, non-distended, no hepatosplenomegaly. Bowel sounds present.  EXTREMITIES: no lower extremity edema  NEURO: Cranial nerves II-XII grossly intact  INTEGUMENT: No rashes    LABS REVIEWED THIS VISIT:  3/5/18 DEXA bone density scan:  Lowest T-score = -0.4    IMPRESSION/PLAN: 63 yo female with stage IIb, T3N0M0, grade I, ER positive, IN positive, HER2 negative invasive lobular carcinoma of the left breast. She is s/p treatment with left breast mastectomy and has been on letrozole since 01/13/2016.    1.  Left breast estrogen receptor-positive cancer:  Ms. Vegas is approximately 2 years 2 months out from excision of her left-sided breast cancer.  She continues on letrozole.  She is tolerating treatment well.  We are planning on treating her for a total of 10 years.  There is no evidence of disease recurrence on my clinical breast exam performed today.  I will see her back in clinic in 4 months.  Next screening mammogram of the right breast will be due in 10/2016.    2.  Bone health:  She is at increased risk of osteoporosis on letrozole.  DEXA bone density scan in 01/2016 showed normal bone density.  DEXA today shows a lowest T-score of -0.4.  Continue to take calcium 1200 mg and vitamin D 1000 IU " daily as well as walk a half hour daily.      3.  Hot Flashes:  She declines medical intervention at this time.      4.  Left lateral foot pain:  Likely overuse injury vs stress fracture.  Advised rest/refraining from activities that exacerbate pain.  Contact the clinic if persistent pain in the upcoming weeks at which time I would recommend further evaluation with an X-ray.    5.  Left axillary cord:  Referral to lymphedema clinic placed today.      6.  Followup:  Return to clinic in 4 months for visit with me.     It was a pleasure to see Ms. Vegas in clinic today.  A total of 20 minutes of our 25 minute face-to-face visit was spent counseling the patient.     Again, thank you for allowing me to participate in the care of your patient.      Sincerely,    Genevieve Rojas MD

## 2018-03-05 NOTE — MR AVS SNAPSHOT
"              After Visit Summary   3/5/2018    Esther Vegas    MRN: [id number]           Patient Information     Date Of Birth          1955        Visit Information        Provider Department      3/5/2018 3:45 PM Genevieve Rojas MD [facility]        Today's Diagnoses     Malignant neoplasm of overlapping sites of left breast in female, estrogen receptor positive (H)    -  1    Lymphedema        Injury of left axilla, initial encounter           Follow-ups after your visit        Additional Services     LYMPHEDEMA THERAPY REFERRAL       *This therapy referral will be filtered to a centralized scheduling office at [facility] and the patient will receive a call to schedule an appointment at a [place] location most convenient for them. *   If you have not heard from the scheduling office within 2 business days, please call [phone] for all locations, with the exception of [place], please call [phone].     Treatment: PT or OT Evaluation & Treatment  Special Instructions: Left axillary cord, h/o left breast mastectomy  PT/OT Treatment Diagnosis: Left axillary cord    Please be aware that coverage of these services is subject to the terms and limitations of your health insurance plan.  Call member services at your health plan with any benefit or coverage questions.      **Note to Provider:  If you are referring outside of [place] for the therapy appointment, please list the name of the location in the \"special instructions\" above, print the referral and give to the patient to schedule the appointment.                  Your next 10 appointments already scheduled     Jul 09, 2018  4:15 PM CDT   (Arrive by 4:00 PM)   Return Visit with Genevieve Roajs MD   [facility] ([facility])    [address] [place] [address]  [address] [place] [id number] [phone]            Nov 06, 2018  8:15 AM CST " "  (Arrive by 8:00 AM)   Return Visit with RADHA Friedman MD   [facility] ([facility])    [address]  [address]  [facility] [address] [phone]              Who to contact     If you have questions or need follow up information about today's clinic visit or your schedule please contact [facility] directly at [phone].  Normal or non-critical lab and imaging results will be communicated to you by Cervilenzhart, letter or phone within 4 business days after the clinic has received the results. If you do not hear from us within 7 days, please contact the clinic through BookingPalt or phone. If you have a critical or abnormal lab result, we will notify you by phone as soon as possible.  Submit refill requests through CitySpark or call your pharmacy and they will forward the refill request to us. Please allow 3 business days for your refill to be completed.          Additional Information About Your Visit        CitySpark Information     CitySpark gives you secure access to your electronic health record. If you see a primary care provider, you can also send messages to your care team and make appointments. If you have questions, please call your primary care clinic.  If you do not have a primary care provider, please call [phone] and they will assist you.        Care EveryWhere ID     This is your Care EveryWhere ID. This could be used by other organizations to access your [place] medical records  [id number]        Your Vitals Were     Pulse Respirations Height Last Period Pulse Oximetry BMI (Body Mass Index)    87 16 1.58 m (5' 2.21\") 01/25/2007 98% 26.89 kg/m2       Blood Pressure from Last 3 Encounters:   03/05/18 155/85   12/04/17 160/90   11/17/17 (!) 154/91    Weight from Last 3 Encounters:   03/05/18 67.1 kg (148 lb)   11/17/17 67.1 kg (147 lb 14.4 oz)   11/10/17 66.2 kg (146 lb)              We Performed the Following     LYMPHEDEMA " THERAPY REFERRAL        Primary Care Provider Office Phone # Fax #    Yana Diaz [phone]       [address] [facility] [address]  [facility] [address]        Equal Access to Services     ELVA HERCULES : Siddhartha priscilla florence michaela Sonani, waaxda luqadaha, qaybta kaalmada adeegyada, pee orona laGaberadha baez. So [facility] [phone].    ATENCIÓN: Si habla español, tiene a neal disposición servicios gratuitos de asistencia lingüística. Llame al [phone].    We comply with applicable federal civil rights laws and [place] laws. We do not discriminate on the basis of race, color, national origin, age, disability, sex, sexual orientation, or gender identity.            Thank you!     Thank you for choosing [facility]  for your care. Our goal is always to provide you with excellent care. Hearing back from our patients is one way we can continue to improve our services. Please take a few minutes to complete the written survey that you may receive in the mail after your visit with us. Thank you!             Your Updated Medication List - Protect others around you: Learn how to safely use, store and throw away your medicines at www.disposemymeds.org.          This list is accurate as of 3/5/18  4:04 PM.  Always use your most recent med list.                   Brand Name Dispense Instructions for use Diagnosis    ADVIL PO      Take 400 mg by mouth every 6 hours as needed        calcium-vitamin D 500-125 MG-UNIT Tabs      Take by mouth daily        cetirizine 10 MG tablet    zyrTEC     Take 10 mg by mouth daily        letrozole 2.5 MG tablet    FEMARA    30 tablet    Take 1 tablet (2.5 mg) by mouth daily    Malignant neoplasm of overlapping sites of left breast in female, estrogen receptor positive (H)       replens Gel     35 g    Place 1 Applicatorful vaginally 2 times daily as needed    Vaginal atrophy

## 2018-07-08 NOTE — PROGRESS NOTES
MEDICAL ONCOLOGY FOLLOW-UP PATIENT VISIT     NAME: Esther Vegas     DATE: 7/9/2018    PRIMARY CARE PHYSICIAN: Yana Diaz    PATIENT ID: Grade I, stage IIb, T3N0M0, ER positive, IL positive, HER2 non-amplified invasive lobular carcinoma of the left breast. Oncotype DX recurrence score = 11.     Oncology History: Ms. Vegas is a 62 year old female with invasive lobular cancer of the left breast. Routine screening mammogram on 10/23/15 demonstrated a spiculated lesion in the upper outer left breast. Ultrasound showed a 0.6 cm mass with indistinct margins at the 1:30 position, 3 cm from the nipple and a second 0.6 cm mass at the 1:00 position, 5 cm from the nipple. Contrast enhanced mammogram then showed non-masslike clumped enhancement in a segmental distribution from the 1 to 3:00 position of the left breast measuring 7.5 cm. Biopsy of the mass at the 1:00 position showed a grade 1 invasive lobular carcinoma and classical type LCIS. Estrogen receptor staining was strongly positive in >95% of tumor cell nuclei. Progesterone receptor staining was moderately positive in 60% of tumor cell nuclei. HER2 was nonamplified by FISH with a HER2 to SAVAGE-17 ratio of 1.2.     Left axillary sentinel lymph node biopsy and left nipple-sparing mastectomy with immediate reconstruction was performed on 12/21/15. Pathology showed invasive lobular carcinoma, [place] grade 2, involving the upper outer quadrant, central breast and lower inner quadrant, size 5.4 cm. There was LCIS, classical type. Surgical margins were negative. A single sentinel LN was negative. [id number] (stage IIB). Tumor was sent for oncotype dx testing which returned with a recurrence score of 11. A recurrence score of 11 correlates with an 8% risk of distant recurrence in 10 years after treatment with tamoxifen alone.  Adjuvant postmastectomy chest wall radiation was not recommended based on lack of high risk features.  She has been on adjuvant letrozole  since 1/13/16.    Interim History:    Ms. Vegas comes in to the clinic today for routine 4-month breast cancer followup.  She is doing well.  Most bothersome to her at this time is swelling of her right knee.  Swelling has been present for the last couple of weeks.  Initially she had pain.  She states it is worse when she kneels.  In fact, when kneeling at [employer] this past weekend, she almost passed out due to the pain.  It has not been painful this past week; however, there is persistent swelling.  She has not had any redness, no fevers or chills.  She denies locking or popping of the knee.  She also reports for the past week she has had right SI pain.  She feels that it is likely due to increased gardening and bending over.  Pain has only been present for a week and  has not been bothersome enough for her take pain medicine; however, it did wake her from sleep last night.  Pain radiates out to the side.  She denies radiation down the leg.  She has had no bowel or bladder incontinence and no numbness of the inner thighs.  She denies other bone or joint aches or pains.  She has no concerning breast lumps or masses.  No shortness of breath, chest pain or cough.  She exercises on a regular basis.  She has been walking as well as climbing stairs in preparation for a hiking trip to [place] this fall.  She has no headaches, visual changes or focal neurologic complaints. Of note, she continues on treatment with letrozole.  She has intermittent hot flashes.  They are not very bothersome to her.  She denies vaginal symptoms.  The remainder of a complete 12-point review of systems is otherwise negative.        PAST MEDICAL HISTORY:   Past Medical History:   Diagnosis Date     BCC (basal cell carcinoma of skin)      Breast cancer (H) 2015    left, invasive lobular     GERD (gastroesophageal reflux disease)      Other malignant neoplasm of skin, site unspecified      Tinnitus      Tubular adenoma of colon      Variants  "of migraine, not elsewhere classified, without mention of intractable migraine without mention of status migrainosus     menstrual migraines       PAST SURGICAL HISTORY:  Past Surgical History:   Procedure Laterality Date     BIOPSY NODE SENTINEL Left 12/21/2015    Procedure: BIOPSY NODE SENTINEL;  Surgeon: Francisco Gomez MD;  Location: [facility] OR     COLONOSCOPY  2006     HEMORRHOIDECTOMY  5/2006     MASTECTOMY, RECONSTRUCT BREAST, COMBINED Left 12/21/2015    Procedure: COMBINED MASTECTOMY, RECONSTRUCT BREAST;  Surgeon: Francisco Gomez MD;  Location: [facility] OR     MASTOPEXY Right 2/15/2016    Procedure: MASTOPEXY;  Surgeon: RADHA Friedman MD;  Location: [facility] OR     RECONSTRUCT BREAST Left 12/21/2015    Procedure: RECONSTRUCT BREAST;  Surgeon: Francisco Gomez MD;  Location: [facility] OR     REMOVE AND REPLACE BREAST IMPLANT PROSTHESIS Left 2/15/2016    Procedure: REMOVE AND REPLACE BREAST IMPLANT PROSTHESIS;  Surgeon: RADHA Friedman MD;  Location: [facility] OR       MEDS:  Current Outpatient Prescriptions   Medication     calcium-vitamin D 500-125 MG-UNIT TABS     cetirizine (ZYRTEC) 10 MG tablet     Ibuprofen (ADVIL PO)     letrozole (FEMARA) 2.5 MG tablet     Vaginal Lubricant (REPLENS) GEL     No current facility-administered medications for this visit.        ALLERGIES:  Allergies   Allergen Reactions     Bactrim [Sulfamethoxazole W/Trimethoprim]      Facial redness     Codeine      Dizziness and nausea     Codeine      Other reaction(s): Dizziness  Dizziness and nausea     Sulfamethoxazole-Trimethoprim      Other reaction(s): Other - Describe In Comment Field  Facial redness     Vicodin [Hydrocodone-Acetaminophen] Nausea        PHYSICAL EXAM:  /69  Pulse 75  Temp 97.7  F (36.5  C) (Oral)  Resp 16  Ht 1.581 m (5' 2.25\")  Wt 68.3 kg (150 lb 9 oz)  LMP 01/25/2007  SpO2 98%  BMI 27.32 kg/m2  LMP 01/25/2007  KPS: 100%  GENERAL: A&Ox3, well nourished, well appearing adult female in no acute distress  HEENT: " Normocephalic, atraumatic.  MMM.  No lesions of the oropharynx  LYMPH: No palpable cervical, supraclavicular, or axillary lymphadenopathy  CV: RRR, normal S1/S2, No murmurs, gallops, or rubs  BREAST:  Left breast mastectomy with reconstruction.  Left axillary cord still present but not as tight as prior exam.  There are no dominant masses palpable in the right breast or overlying the left implant.  Right nipple is everted.    LUNGS: CTA B/L, no wheezing or rhonchi  ABDOMEN: soft, nontender, non-distended, no hepatosplenomegaly. Bowel sounds present.  EXTREMITIES: no pitting edema of the bilateral lower extremities.  Full ROM of LUE.  NEURO: Cranial nerves II-XII grossly intact.  Gait stable.  INTEGUMENT: No visible rashes or lesions.    LABS REVIEWED THIS VISIT:  No interim imaging or laboratory results pertinent to today's visit.    IMPRESSION/PLAN: 63 yo female with stage IIb, T3N0M0, grade I, ER positive, NV positive, HER2 negative invasive lobular carcinoma of the left breast. She is s/p treatment with left breast mastectomy and has been on letrozole since 01/13/2016.    1.  Left breast estrogen receptor-positive cancer:  Ms. Vegas is approximately 2 years 6.5 months out from excision of her left breast cancer.  She continues on letrozole.  She is tolerating it well.  We are planning on treating her for a total of 10 years (through 01/2026).  There is no evidence of disease recurrence on my clinical breast exam performed today.  I will see her back in clinic in 4 months.  She will be due for annual screening mammogram of the right breast at that time.    2.  Bone health:  She is at increased risk of osteoporosis on letrozole.  DEXA in 03/2018 showed normal bone density with a lowest T-score of -0.4.  Continue to take calcium 1200 mg and vitamin D 1000 IU daily as well as walk a half hour daily.      3.  Hot Flashes:  She declines medical intervention at this time.      4.  Right knee bursitis:  Advised to  avoid bending of the knee such as climbing stairs and kneeling.  She has a hiking trip in [place] in early May.  We discussed if still swollen in the next 2 weeks, would recommend going to the 4th floor orthopedic walk-in clinic for aspiration.    5.  Left SI pain:  Likely DJD.  Advised rest (avoiding repetitive bending over) and NSAIDs prn.  Contact the clinic if persistent pain in the next 2 weeks.    6.  Left axillary cord:  Still present but not as stiff as on prior exams.  She declines visit with lymphedema clinic at this time, but will notify us if decreased range of movement.    7.  Followup:  Return to clinic in approximately 4 months for right breast screening mammogram and visit with me.     It was a pleasure to see Ms. Vegas in clinic today.  A total of 20 minutes of our 25 minute face-to-face visit was spent counseling the patient.

## 2018-07-09 ENCOUNTER — ONCOLOGY VISIT (OUTPATIENT)
Dept: ONCOLOGY | Facility: CLINIC | Age: 63
End: 2018-07-09
Attending: INTERNAL MEDICINE
Payer: COMMERCIAL

## 2018-07-09 VITALS
WEIGHT: 150.56 LBS | BODY MASS INDEX: 27.7 KG/M2 | OXYGEN SATURATION: 98 % | SYSTOLIC BLOOD PRESSURE: 124 MMHG | TEMPERATURE: 97.7 F | HEIGHT: 62 IN | RESPIRATION RATE: 16 BRPM | HEART RATE: 75 BPM | DIASTOLIC BLOOD PRESSURE: 69 MMHG

## 2018-07-09 DIAGNOSIS — Z12.31 ENCOUNTER FOR SCREENING MAMMOGRAM FOR BREAST CANCER: ICD-10-CM

## 2018-07-09 DIAGNOSIS — C50.812 MALIGNANT NEOPLASM OF OVERLAPPING SITES OF LEFT BREAST IN FEMALE, ESTROGEN RECEPTOR POSITIVE (H): Primary | ICD-10-CM

## 2018-07-09 DIAGNOSIS — Z17.0 MALIGNANT NEOPLASM OF OVERLAPPING SITES OF LEFT BREAST IN FEMALE, ESTROGEN RECEPTOR POSITIVE (H): Primary | ICD-10-CM

## 2018-07-09 PROCEDURE — 99214 OFFICE O/P EST MOD 30 MIN: CPT | Mod: ZP | Performed by: INTERNAL MEDICINE

## 2018-07-09 PROCEDURE — G0463 HOSPITAL OUTPT CLINIC VISIT: HCPCS | Mod: ZF

## 2018-07-09 ASSESSMENT — ENCOUNTER SYMPTOMS
ARTHRALGIAS: 1
JOINT SWELLING: 1
BACK PAIN: 1

## 2018-07-09 ASSESSMENT — PAIN SCALES - GENERAL: PAINLEVEL: NO PAIN (0)

## 2018-07-09 NOTE — NURSING NOTE
"Oncology Rooming Note    July 9, 2018 4:38 PM   Esther Vegas is a 62 year old female who presents for:    Chief Complaint   Patient presents with     Oncology Clinic Visit     Return: Breast CA     Initial Vitals: /69  Pulse 75  Temp 97.7  F (36.5  C) (Oral)  Resp 16  Ht 1.581 m (5' 2.25\")  Wt 68.3 kg (150 lb 9 oz)  LMP 01/25/2007  SpO2 98%  BMI 27.32 kg/m2 Estimated body mass index is 27.32 kg/(m^2) as calculated from the following:    Height as of this encounter: 1.581 m (5' 2.25\").    Weight as of this encounter: 68.3 kg (150 lb 9 oz). Body surface area is 1.73 meters squared.  No Pain (0) Comment: Data Unavailable   Patient's last menstrual period was 01/25/2007.  Allergies reviewed: Yes  Medications reviewed: Yes    Medications: Medication refills not needed today.  Pharmacy name entered into [facility]:    [id number] [place] [address] [facility] [address] [place] [address] AT [facility] [address]    Clinical concerns: no new concerns today Dr. Rojas was notified.    10 minutes for nursing intake (face to face time)     Lloyd Lazaro CMA              "

## 2018-07-09 NOTE — MR AVS SNAPSHOT
"              After Visit Summary   7/9/2018    Esther Vegas    MRN: [id number]           Patient Information     Date Of Birth          1955        Visit Information        Provider Department      7/9/2018 4:15 PM Genevieve Rojas MD [facility]        Today's Diagnoses     Malignant neoplasm of overlapping sites of left breast in female, estrogen receptor positive (H)    -  1    Encounter for screening mammogram for breast cancer           Follow-ups after your visit        Your next 10 appointments already scheduled     Nov 06, 2018  8:15 AM CST   (Arrive by 8:00 AM)   Return Visit with RADHA Friedman MD   [facility] [place] ([email or web address] [place])    [address]  [address]  [facility] [id number] [phone]            Nov 12, 2018  3:30 PM CST   MA SCREENING DIGITAL RIGHT with [id number]   [facility] ([facility])    [address]  [facility] [id number] [phone]           Do not use any powder, lotion or deodorant under your arms or on your breast. If you do, we will ask you to remove it before your exam.  Wear comfortable, two-piece clothing.  If you have any allergies, tell your care team.  Bring any previous mammograms from other facilities or have them mailed to the breast center. Three-dimensional (3D) mammograms are available at [place] locations in [facility], [place], [place], [facility], [place], [place], and [place]. [facility] locations include [place] and [facility] in [place]. Benefits of 3D mammograms include: - Improved rate of cancer detection - Decreases your chance of having to go back for more tests, which means fewer: - \"False-positive\" results (This means that there is an abnormal area but it isn't cancer.) - Invasive testing procedures, such as a biopsy or surgery - Can provide clearer images of the breast " if you have dense breast tissue. 3D mammography is an optional exam that anyone can have with a 2D mammogram. It doesn't replace or take the place of a 2D mammogram. 2D mammograms remain an effective screening test for all women.  Not all insurance companies cover the cost of a 3D mammogram. Check with your insurance.            Nov 12, 2018  4:15 PM CST   (Arrive by 4:00 PM)   Return Visit with Genevieve Rojas MD   [facility] ([facility] [place])    [address]  [address]  [facility] [address] [phone]              Who to contact     If you have questions or need follow up information about today's clinic visit or your schedule please contact [facility] directly at [phone].  Normal or non-critical lab and imaging results will be communicated to you by Heaphart, letter or phone within 4 business days after the clinic has received the results. If you do not hear from us within 7 days, please contact the clinic through Heaphart or phone. If you have a critical or abnormal lab result, we will notify you by phone as soon as possible.  Submit refill requests through DealTraction or call your pharmacy and they will forward the refill request to us. Please allow 3 business days for your refill to be completed.          Additional Information About Your Visit        DealTraction Information     DealTraction gives you secure access to your electronic health record. If you see a primary care provider, you can also send messages to your care team and make appointments. If you have questions, please call your primary care clinic.  If you do not have a primary care provider, please call [phone] and they will assist you.        Care EveryWhere ID     This is your Care EveryWhere ID. This could be used by other organizations to access your [place] medical records  [id number]        Your Vitals Were     Pulse Temperature Respirations Height Last  "Period Pulse Oximetry    75 97.7  F (36.5  C) (Oral) 16 1.581 m (5' 2.25\") 01/25/2007 98%    BMI (Body Mass Index)                   27.32 kg/m2            Blood Pressure from Last 3 Encounters:   07/09/18 124/69   03/05/18 155/85   12/04/17 160/90    Weight from Last 3 Encounters:   07/09/18 68.3 kg (150 lb 9 oz)   03/05/18 67.1 kg (148 lb)   11/17/17 67.1 kg (147 lb 14.4 oz)               Primary Care Provider Office Phone # Fax #    Yana Diaz [phone]       [address] [address]        Equal Access to Services     ELVA [facility] SystemBRUNILDA : Hadtanya jennings Sonani, waaxda luqadaha, qaybta kaalmada adeegyaporfirio, pee calvert . So [facility] [phone].    ATENCIÓN: Si habla español, tiene a neal disposición servicios gratuitos de asistencia lingüística. Llame al [phone].    We comply with applicable federal civil rights laws and [place] laws. We do not discriminate on the basis of race, color, national origin, age, disability, sex, sexual orientation, or gender identity.            Thank you!     Thank you for choosing [facility]  for your care. Our goal is always to provide you with excellent care. Hearing back from our patients is one way we can continue to improve our services. Please take a few minutes to complete the written survey that you may receive in the mail after your visit with us. Thank you!             Your Updated Medication List - Protect others around you: Learn how to safely use, store and throw away your medicines at www.disposemymeds.org.          This list is accurate as of 7/9/18 11:59 PM.  Always use your most recent med list.                   Brand Name Dispense Instructions for use Diagnosis    ADVIL PO      Take 400 mg by mouth every 6 hours as needed        calcium-vitamin D 500-125 MG-UNIT Tabs      Take by mouth daily        cetirizine 10 MG tablet    zyrTEC     Take 10 mg by mouth daily        " letrozole 2.5 MG tablet    FEMARA    30 tablet    Take 1 tablet (2.5 mg) by mouth daily    Malignant neoplasm of overlapping sites of left breast in female, estrogen receptor positive (H)

## 2018-07-09 NOTE — LETTER
7/9/2018       RE: Esther Vegas  [address] [facility] [address]     Dear Colleague,    Thank you for referring your patient, Esther Vegas, to the [facility]. Please see a copy of my visit note below.    MEDICAL ONCOLOGY FOLLOW-UP PATIENT VISIT     NAME: Esther Vegas     DATE: 7/9/2018    PRIMARY CARE PHYSICIAN: Yana Diaz    PATIENT ID: Grade I, stage IIb, T3N0M0, ER positive, MS positive, HER2 non-amplified invasive lobular carcinoma of the left breast. Oncotype DX recurrence score = 11.     Oncology History: Ms. Vegas is a 62 year old female with invasive lobular cancer of the left breast. Routine screening mammogram on 10/23/15 demonstrated a spiculated lesion in the upper outer left breast. Ultrasound showed a 0.6 cm mass with indistinct margins at the 1:30 position, 3 cm from the nipple and a second 0.6 cm mass at the 1:00 position, 5 cm from the nipple. Contrast enhanced mammogram then showed non-masslike clumped enhancement in a segmental distribution from the 1 to 3:00 position of the left breast measuring 7.5 cm. Biopsy of the mass at the 1:00 position showed a grade 1 invasive lobular carcinoma and classical type LCIS. Estrogen receptor staining was strongly positive in >95% of tumor cell nuclei. Progesterone receptor staining was moderately positive in 60% of tumor cell nuclei. HER2 was nonamplified by FISH with a HER2 to SAVAGE-17 ratio of 1.2.     Left axillary sentinel lymph node biopsy and left nipple-sparing mastectomy with immediate reconstruction was performed on 12/21/15. Pathology showed invasive lobular carcinoma, [place] grade 2, involving the upper outer quadrant, central breast and lower inner quadrant, size 5.4 cm. There was LCIS, classical type. Surgical margins were negative. A single sentinel LN was negative. [id number] (stage IIB). Tumor was sent for oncotype dx testing which returned with a recurrence score of 11. A recurrence score of  11 correlates with an 8% risk of distant recurrence in 10 years after treatment with tamoxifen alone.  Adjuvant postmastectomy chest wall radiation was not recommended based on lack of high risk features.  She has been on adjuvant letrozole since 1/13/16.    Interim History:    Ms. Vegas comes in to the clinic today for routine 4-month breast cancer followup.  She is doing well.  Most bothersome to her at this time is swelling of her right knee.  Swelling has been present for the last couple of weeks.  Initially she had pain.  She states it is worse when she kneels.  In fact, when kneeling at [employer] this past weekend, she almost passed out due to the pain.  It has not been painful this past week; however, there is persistent swelling.  She has not had any redness, no fevers or chills.  She denies locking or popping of the knee.  She also reports for the past week she has had right SI pain.  She feels that it is likely due to increased gardening and bending over.  Pain has only been present for a week and  has not been bothersome enough for her take pain medicine; however, it did wake her from sleep last night.  Pain radiates out to the side.  She denies radiation down the leg.  She has had no bowel or bladder incontinence and no numbness of the inner thighs.  She denies other bone or joint aches or pains.  She has no concerning breast lumps or masses.  No shortness of breath, chest pain or cough.  She exercises on a regular basis.  She has been walking as well as climbing stairs in preparation for a hiking trip to [place] this fall.  She has no headaches, visual changes or focal neurologic complaints. Of note, she continues on treatment with letrozole.  She has intermittent hot flashes.  They are not very bothersome to her.  She denies vaginal symptoms.  The remainder of a complete 12-point review of systems is otherwise negative.        PAST MEDICAL HISTORY:   Past Medical History:   Diagnosis Date     BCC  (basal cell carcinoma of skin)      Breast cancer (H) 2015    left, invasive lobular     GERD (gastroesophageal reflux disease)      Other malignant neoplasm of skin, site unspecified      Tinnitus      Tubular adenoma of colon      Variants of migraine, not elsewhere classified, without mention of intractable migraine without mention of status migrainosus     menstrual migraines       PAST SURGICAL HISTORY:  Past Surgical History:   Procedure Laterality Date     BIOPSY NODE SENTINEL Left 12/21/2015    Procedure: BIOPSY NODE SENTINEL;  Surgeon: Francisco Gomez MD;  Location: [facility] OR     COLONOSCOPY  2006     HEMORRHOIDECTOMY  5/2006     MASTECTOMY, RECONSTRUCT BREAST, COMBINED Left 12/21/2015    Procedure: COMBINED MASTECTOMY, RECONSTRUCT BREAST;  Surgeon: Francisco Gomez MD;  Location: [facility] OR     MASTOPEXY Right 2/15/2016    Procedure: MASTOPEXY;  Surgeon: RADHA Friedman MD;  Location: [facility] OR     RECONSTRUCT BREAST Left 12/21/2015    Procedure: RECONSTRUCT BREAST;  Surgeon: Francisco Gomez MD;  Location: [facility] OR     REMOVE AND REPLACE BREAST IMPLANT PROSTHESIS Left 2/15/2016    Procedure: REMOVE AND REPLACE BREAST IMPLANT PROSTHESIS;  Surgeon: RADHA Friedman MD;  Location: [facility] OR       MEDS:  Current Outpatient Prescriptions   Medication     calcium-vitamin D 500-125 MG-UNIT TABS     cetirizine (ZYRTEC) 10 MG tablet     Ibuprofen (ADVIL PO)     letrozole (FEMARA) 2.5 MG tablet     Vaginal Lubricant (REPLENS) GEL     No current facility-administered medications for this visit.        ALLERGIES:  Allergies   Allergen Reactions     Bactrim [Sulfamethoxazole W/Trimethoprim]      Facial redness     Codeine      Dizziness and nausea     Codeine      Other reaction(s): Dizziness  Dizziness and nausea     Sulfamethoxazole-Trimethoprim      Other reaction(s): Other - Describe In Comment Field  Facial redness     Vicodin [Hydrocodone-Acetaminophen] Nausea        PHYSICAL EXAM:  /69  Pulse 75  Temp 97.7  F  "(36.5  C) (Oral)  Resp 16  Ht 1.581 m (5' 2.25\")  Wt 68.3 kg (150 lb 9 oz)  LMP 01/25/2007  SpO2 98%  BMI 27.32 kg/m2  LMP 01/25/2007  KPS: 100%  GENERAL: A&Ox3, well nourished, well appearing adult female in no acute distress  HEENT: Normocephalic, atraumatic.  MMM.  No lesions of the oropharynx  LYMPH: No palpable cervical, supraclavicular, or axillary lymphadenopathy  CV: RRR, normal S1/S2, No murmurs, gallops, or rubs  BREAST:  Left breast mastectomy with reconstruction.  Left axillary cord still present but not as tight as prior exam.  There are no dominant masses palpable in the right breast or overlying the left implant.  Right nipple is everted.    LUNGS: CTA B/L, no wheezing or rhonchi  ABDOMEN: soft, nontender, non-distended, no hepatosplenomegaly. Bowel sounds present.  EXTREMITIES: no pitting edema of the bilateral lower extremities.  Full ROM of LUE.  NEURO: Cranial nerves II-XII grossly intact.  Gait stable.  INTEGUMENT: No visible rashes or lesions.    LABS REVIEWED THIS VISIT:  No interim imaging or laboratory results pertinent to today's visit.    IMPRESSION/PLAN: 63 yo female with stage IIb, T3N0M0, grade I, ER positive, SD positive, HER2 negative invasive lobular carcinoma of the left breast. She is s/p treatment with left breast mastectomy and has been on letrozole since 01/13/2016.    1.  Left breast estrogen receptor-positive cancer:  Ms. Vegas is approximately 2 years 6.5 months out from excision of her left breast cancer.  She continues on letrozole.  She is tolerating it well.  We are planning on treating her for a total of 10 years (through 01/2026).  There is no evidence of disease recurrence on my clinical breast exam performed today.  I will see her back in clinic in 4 months.  She will be due for annual screening mammogram of the right breast at that time.    2.  Bone health:  She is at increased risk of osteoporosis on letrozole.  DEXA in 03/2018 showed normal bone density with " a lowest T-score of -0.4.  Continue to take calcium 1200 mg and vitamin D 1000 IU daily as well as walk a half hour daily.      3.  Hot Flashes:  She declines medical intervention at this time.      4.  Right knee bursitis:  Advised to avoid bending of the knee such as climbing stairs and kneeling.  She has a hiking trip in [place] in early May.  We discussed if still swollen in the next 2 weeks, would recommend going to the 4th floor orthopedic walk-in clinic for aspiration.    5.  Left SI pain:  Likely DJD.  Advised rest (avoiding repetitive bending over) and NSAIDs prn.  Contact the clinic if persistent pain in the next 2 weeks.    6.  Left axillary cord:  Still present but not as stiff as on prior exams.  She declines visit with lymphedema clinic at this time, but will notify us if decreased range of movement.    7.  Followup:  Return to clinic in approximately 4 months for right breast screening mammogram and visit with me.     It was a pleasure to see Ms. Vegas in clinic today.  A total of 20 minutes of our 25 minute face-to-face visit was spent counseling the patient.         Again, thank you for allowing me to participate in the care of your patient.      Sincerely,    Genevieve Rojas MD

## 2018-11-06 ENCOUNTER — OFFICE VISIT (OUTPATIENT)
Dept: PLASTIC SURGERY | Facility: CLINIC | Age: 63
End: 2018-11-06
Attending: PLASTIC SURGERY
Payer: COMMERCIAL

## 2018-11-06 VITALS
WEIGHT: 150.2 LBS | RESPIRATION RATE: 14 BRPM | BODY MASS INDEX: 27.64 KG/M2 | OXYGEN SATURATION: 98 % | TEMPERATURE: 96.9 F | DIASTOLIC BLOOD PRESSURE: 87 MMHG | HEIGHT: 62 IN | HEART RATE: 79 BPM | SYSTOLIC BLOOD PRESSURE: 163 MMHG

## 2018-11-06 DIAGNOSIS — Z98.890 S/P BREAST RECONSTRUCTION, LEFT: Primary | ICD-10-CM

## 2018-11-06 PROCEDURE — G0463 HOSPITAL OUTPT CLINIC VISIT: HCPCS | Mod: ZF

## 2018-11-06 ASSESSMENT — PAIN SCALES - GENERAL: PAINLEVEL: NO PAIN (0)

## 2018-11-06 NOTE — MR AVS SNAPSHOT
After Visit Summary   11/6/2018    Esther Vegas    MRN: [id number]           Patient Information     Date Of Birth          1955        Visit Information        Provider Department      11/6/2018 8:15 AM RADHA Friedman MD [facility]        Today's Diagnoses     S/P breast reconstruction, left    -  1       Follow-ups after your visit        Follow-up notes from your care team     Return if symptoms worsen or fail to improve.      Your next 10 appointments already scheduled     Nov 12, 2018  3:30 PM CST   MA SCREENING DIGITAL RIGHT with [id number]   [facility] ([facility])    [address]  [facility] [address] [phone]           How do I prepare for my exam? (Food and drink instructions) No Food and Drink Restrictions.  How do I prepare for my exam? (Other instructions) Do not use any powder, lotion or deodorant under your arms or on your breast. If you do, we will ask you to remove it before your exam.  What should I wear: Wear comfortable, two-piece clothing.  How long does the exam take: Most scans will take 15 minutes.  What should I bring: Bring any previous mammograms from other facilities or have them mailed to the breast center.  Do I need a :  No  is needed.  What do I need to tell my doctor: If you have any allergies, tell your care team.  What should I do after the exam: No restrictions, You may resume normal activities.  What is this test: This test is an x-ray of the breast to look for breast disease. The breast is pressed between two plates to flatten and spread the tissue. An X-ray is taken of the breast from different angles.  Who should I call with questions: If you have any questions, please call the Imaging Department where you will have your exam. Directions, parking instructions, and other information is available on our website, [email or web address].  Other information  "about my exam Three-dimensional (3D) mammograms are available at [facility] locations in [facility], [facility], [place], and [place].  Health locations include [place] and [facility] in [place].  Benefits of 3D mammograms include: * Improved rate of cancer detection * Decreases your chance of having to go back for more tests, which means fewer: * \"False-positive\" results (This means that there is an abnormal area but it isn't cancer.) * Invasive testing procedures, such as a biopsy or surgery * Can provide clearer images of the breast if you have dense breast tissue.  *3D mammography is an optional exam that anyone can have with a 2D mammogram. It doesn't replace or take the place of a 2D mammogram. 2D mammograms remain an effective screening test for all women.  Not all insurance companies cover the cost of a 3D mammogram. Check with your insurance. Three-dimensional (3D) mammograms are available at [facility] locations in [facility], [facility], [facility], and [place]. Health locations include [place] and [facility] in [place]. Benefits of 3D mammograms include: - Improved rate of cancer detection - Decreases your chance of having to go back for more tests, which means fewer: - \"False-positive\" results (This means that there is an abnormal area but it isn't cancer.) - Invasive testing procedures, such as a biopsy or surgery - Can provide clearer images of the breast if you have dense breast tissue. 3D mammography is an optional exam that anyone can have with a 2D mammogram. It doesn't replace or take the place of a 2D mammogram. 2D mammograms remain an effective screening test for all women.  Not all insurance companies cover the cost of a 3D mammogram. Check with your insurance.            Nov 12, 2018  4:15 PM CST   (Arrive by 4:00 PM)   Return Visit with Genevieve Rojas MD   M " "[facility] ([facility])    [address]  [address]  [facility] [address] [phone]              Who to contact     If you have questions or need follow up information about today's clinic visit or your schedule please contact [facility] directly at [phone].  Normal or non-critical lab and imaging results will be communicated to you by MyChart, letter or phone within 4 business days after the clinic has received the results. If you do not hear from us within 7 days, please contact the clinic through [email or web address] or phone. If you have a critical or abnormal lab result, we will notify you by phone as soon as possible.  Submit refill requests through [employer] or call your pharmacy and they will forward the refill request to us. Please allow 3 business days for your refill to be completed.          Additional Information About Your Visit        [facility] Information     [employer] gives you secure access to your electronic health record. If you see a primary care provider, you can also send messages to your care team and make appointments. If you have questions, please call your primary care clinic.  If you do not have a primary care provider, please call [phone] and they will assist you.        Care EveryWhere ID     This is your Care EveryWhere ID. This could be used by other organizations to access your [facility] medical records  [id number]        Your Vitals Were     Pulse Temperature Respirations Height Last Period Pulse Oximetry    79 96.9  F (36.1  C) (Oral) 14 5' 2.24\" 01/25/2007 98%    BMI (Body Mass Index)                   27.26 kg/m2            Blood Pressure from Last 3 Encounters:   11/06/18 163/87   07/09/18 124/69   03/05/18 155/85    Weight from Last 3 Encounters:   11/06/18 150 lb 3.2 oz   07/09/18 150 lb 9 oz   03/05/18 148 lb              Today, you had the following     No orders found for display       Primary Care Provider " Office Phone # Fax #    Yana GUNNAR Diaz [phone]       [address]  [facility] [address]        Equal Access to Services     MOUSTAPHAZION DELISA : Hadtanya priscilla ku luisitoo Sopriyaali, waaxda luqadaha, qaybta kaalmada adecornelia, pee orona laGaberadha baez. So [facility] [phone].    ATENCIÓN: Si habla español, tiene a neal disposición servicios gratuitos de asistencia lingüística. Llame al [phone].    We comply with applicable federal civil rights laws and [place] laws. We do not discriminate on the basis of race, color, national origin, age, disability, sex, sexual orientation, or gender identity.            Thank you!     Thank you for choosing [facility]  for your care. Our goal is always to provide you with excellent care. Hearing back from our patients is one way we can continue to improve our services. Please take a few minutes to complete the written survey that you may receive in the mail after your visit with us. Thank you!             Your Updated Medication List - Protect others around you: Learn how to safely use, store and throw away your medicines at www.disposemymeds.org.          This list is accurate as of 11/6/18 10:14 AM.  Always use your most recent med list.                   Brand Name Dispense Instructions for use Diagnosis    ADVIL PO      Take 400 mg by mouth every 6 hours as needed        calcium-vitamin D 500-125 MG-UNIT Tabs      Take by mouth daily        cetirizine 10 MG tablet    zyrTEC     Take 10 mg by mouth daily        letrozole 2.5 MG tablet    FEMARA    30 tablet    Take 1 tablet (2.5 mg) by mouth daily    Malignant neoplasm of overlapping sites of left breast in female, estrogen receptor positive (H)

## 2018-11-06 NOTE — LETTER
11/6/2018       RE: Esther Vegas  [address] [facility] [address]     Dear Colleague,    Thank you for referring your patient, Esther Vegas, to the [facility] at [facility]. Please see a copy of my visit note below.    PRESENTING COMPLAINT:  Postoperative visit, status post left-sided stage II reconstruction and left-sided lift done on 02/15/2016.       HISTORY OF PRESENTING COMPLAINT:  Mr. Vegas is 60 years old.  She is now over 2.5 years  out from her last surgery, doing extremely well, very happy with the results, no issues.       PHYSICAL EXAMINATION:  On exam vital signs stable.  She is afebrile in no obvious distress.  Breasts are healed.  Right breast is slightly more ptotic than the left side but overall volume symmetry is excellent.       ASSESSMENT AND PLAN:  Based on above findings, a diagnosis of bilateral breast reconstruction was made.  She is happy with the result.  I am happy with the result.  I will see her back on a yearly basis to monitor the implant.  All questions were answered.  She was happy with the visit.  All exam done in the presence of my nurse    Again, thank you for allowing me to participate in the care of your patient.      Sincerely,    RADHA Friedman MD

## 2018-11-06 NOTE — PROGRESS NOTES
PRESENTING COMPLAINT:  Postoperative visit, status post left-sided stage II reconstruction and left-sided lift done on 02/15/2016.       HISTORY OF PRESENTING COMPLAINT:  Mr. Vegas is 60 years old.  She is now over 2.5 years  out from her last surgery, doing extremely well, very happy with the results, no issues.       PHYSICAL EXAMINATION:  On exam vital signs stable.  She is afebrile in no obvious distress.  Breasts are healed.  Right breast is slightly more ptotic than the left side but overall volume symmetry is excellent.       ASSESSMENT AND PLAN:  Based on above findings, a diagnosis of bilateral breast reconstruction was made.  She is happy with the result.  I am happy with the result.  I will see her back on a yearly basis to monitor the implant.  All questions were answered.  She was happy with the visit.  All exam done in the presence of my nurse

## 2018-11-06 NOTE — NURSING NOTE
"Oncology Rooming Note    November 6, 2018 8:15 AM   Esther Vegas is a 62 year old female who presents for:    Chief Complaint   Patient presents with     Oncology Clinic Visit     [facility] RETURN- BREAST CA     Initial Vitals: /87 (BP Location: Right arm, Patient Position: Chair, Cuff Size: Adult Regular)  Pulse 79  Temp 96.9  F (36.1  C) (Oral)  Resp 14  Ht 1.581 m (5' 2.24\")  Wt 68.1 kg (150 lb 3.2 oz)  LMP 01/25/2007  SpO2 98%  BMI 27.26 kg/m2 Estimated body mass index is 27.26 kg/(m^2) as calculated from the following:    Height as of this encounter: 1.581 m (5' 2.24\").    Weight as of this encounter: 68.1 kg (150 lb 3.2 oz). Body surface area is 1.73 meters squared.  No Pain (0) Comment: Data Unavailable   Patient's last menstrual period was 01/25/2007.  Allergies reviewed: Yes  Medications reviewed: Yes    Medications: Medication refills not needed today.  Pharmacy name entered into [facility]:    [id number] [place] [address] [facility] - [address] AT [facility] [address]    Clinical concerns: No new concerns. Elder was notified.    10 minutes for nursing intake (face to face time)     Romulo Hernández LPN            "

## 2018-11-11 NOTE — PROGRESS NOTES
MEDICAL ONCOLOGY FOLLOW-UP PATIENT VISIT     NAME: Esther Vegas     DATE: 11/12/2018    PRIMARY CARE PHYSICIAN: Yana Diaz    PATIENT ID: Grade I, stage IIb, T3N0M0, ER positive, [place] positive, HER2 non-amplified invasive lobular carcinoma of the left breast. Oncotype DX recurrence score = 11.     Oncology History: Ms. Vegas is a 62 year old female with invasive lobular cancer of the left breast. Routine screening mammogram on 10/23/15 demonstrated a spiculated lesion in the upper outer left breast. Ultrasound showed a 0.6 cm mass with indistinct margins at the 1:30 position, 3 cm from the nipple and a second 0.6 cm mass at the 1:00 position, 5 cm from the nipple. Contrast enhanced mammogram then showed non-masslike clumped enhancement in a segmental distribution from the 1 to 3:00 position of the left breast measuring 7.5 cm. Biopsy of the mass at the 1:00 position showed a grade 1 invasive lobular carcinoma and classical type LCIS. Estrogen receptor staining was strongly positive in >95% of tumor cell nuclei. Progesterone receptor staining was moderately positive in 60% of tumor cell nuclei. HER2 was nonamplified by FISH with a HER2 to SAVAGE-17 ratio of 1.2.     Left axillary sentinel lymph node biopsy and left nipple-sparing mastectomy with immediate reconstruction was performed on 12/21/15. Pathology showed invasive lobular carcinoma, [place] grade 2, involving the upper outer quadrant, central breast and lower inner quadrant, size 5.4 cm. There was LCIS, classical type. Surgical margins were negative. A single sentinel LN was negative. [id number] (stage IIB). Tumor was sent for oncotype dx testing which returned with a recurrence score of 11. A recurrence score of 11 correlates with an 8% risk of distant recurrence in 10 years after treatment with tamoxifen alone.  Adjuvant postmastectomy chest wall radiation was not recommended based on lack of high risk features.  She has been on adjuvant letrozole  since 1/13/16.    Interim History:   Ms. Vegas comes into the clinic today for routine breast cancer followup.  She continues on treatment with letrozole.  She continues to have intermittent hot flashes; however, reports them as tolerable.  Likewise, vaginal dryness is also tolerable.  She has no symptoms of depression or anxiety.  Weight has been stable.  In August, she went on a hiking trip in [place].  This was in [place] and [place].  She fell and landed on a rock which struck her left rib.  She immediately had pain.  In the ensuing months, the pain seems to have improved.  She still has tenderness with palpation of this area, however.  She states that the left SI joint pain that she had at previous visit has since resolved.  She denies other new bone or joint aches or pains.  She denies concerning breast lumps or masses.  She has no cough, shortness of breath or chest pain.  She denies current abdominal complaints.  She has no headaches, visual changes or focal neurologic complaints.  The remainder of a complete 12-point review of systems was completed and was otherwise negative.     PAST MEDICAL HISTORY:   Past Medical History:   Diagnosis Date     BCC (basal cell carcinoma of skin)      Breast cancer (H) 2015    left, invasive lobular     GERD (gastroesophageal reflux disease)      Other malignant neoplasm of skin, site unspecified      Tinnitus      Tubular adenoma of colon      Variants of migraine, not elsewhere classified, without mention of intractable migraine without mention of status migrainosus     menstrual migraines       PAST SURGICAL HISTORY:  Past Surgical History:   Procedure Laterality Date     BIOPSY NODE SENTINEL Left 12/21/2015    Procedure: BIOPSY NODE SENTINEL;  Surgeon: Francisco Gomez MD;  Location: [facility]     COLONOSCOPY  2006     HEMORRHOIDECTOMY  5/2006     MASTECTOMY, RECONSTRUCT BREAST, COMBINED Left 12/21/2015    Procedure: COMBINED MASTECTOMY, RECONSTRUCT BREAST;  Surgeon:  "Francisco Gomez MD;  Location: [facility]     MASTOPEXY Right 2/15/2016    Procedure: MASTOPEXY;  Surgeon: RADHA Friedman MD;  Location: [facility]     RECONSTRUCT BREAST Left 12/21/2015    Procedure: RECONSTRUCT BREAST;  Surgeon: Francisco Gomez MD;  Location[facility]     REMOVE AND REPLACE BREAST IMPLANT PROSTHESIS Left 2/15/2016    Procedure: REMOVE AND REPLACE BREAST IMPLANT PROSTHESIS;  Surgeon: RADHA Friedman MD;  Location: [facility]       MEDS:  Current Outpatient Prescriptions   Medication     calcium-vitamin D 500-125 MG-UNIT TABS     cetirizine (ZYRTEC) 10 MG tablet     Ibuprofen (ADVIL PO)     letrozole (FEMARA) 2.5 MG tablet     No current facility-administered medications for this visit.        ALLERGIES:  Allergies   Allergen Reactions     Bactrim [Sulfamethoxazole W/Trimethoprim]      Facial redness     Codeine      Dizziness and nausea     Codeine      Other reaction(s): Dizziness  Dizziness and nausea     Vicodin [Hydrocodone-Acetaminophen] Nausea        PHYSICAL EXAM:  BP (!) 133/91 (BP Location: Right arm, Patient Position: Chair, Cuff Size: Adult Regular)  Pulse 68  Temp 97.4  F (36.3  C) (Oral)  Resp 16  Ht 1.581 m (5' 2.24\")  Wt 68 kg (150 lb)  LMP 01/25/2007  SpO2 98%  BMI 27.22 kg/m2  LMP 01/25/2007  KPS: 100%  GENERAL: Well nourished, well appearing adult female in no acute distress  HEENT: Normocephalic, atraumatic.  MMM.  EOMI.  No lesions of the oropharynx  LYMPH: No palpable cervical, supraclavicular, or axillary lymphadenopathy  CV: RRR, normal S1/S2, No murmurs, gallops, or rubs  BREAST:  Left breast mastectomy with reconstruction.  Increased fibroglandular density lower inner right breast, but without discretely palpable mass.  Lateral aspect of right mastopexy incision with mild erythema and 2-3 mm nodule.  There are no dominant masses palpable in the right breast or overlying the left implant.  Right nipple is everted.    LUNGS: CTA B/L, no wheezing or rhonchi  ABDOMEN: " soft, nontender, non-distended, no hepatosplenomegaly. Bowel sounds present.  EXTREMITIES: no pitting edema of the bilateral lower extremities.  Full ROM of LUE.  NEURO: Cranial nerves II-XII grossly intact.  Gait stable.  PSYCH:  Mood and affect appear normal.  INTEGUMENT: No visible concerning rashes or lesions.    LABS REVIEWED THIS VISIT:  11/12/18 Bilateral screening mammograms:  No suspicious findings on my view, final radiology read is pending.    IMPRESSION/PLAN: 63 yo female with a h/o T3N0M0, grade I, ER positive, [place] positive, HER2 negative invasive lobular carcinoma of the left breast. She is s/p treatment with left breast mastectomy and has been on letrozole since 01/13/2016.    1.  Left breast estrogen receptor-positive cancer:  Ms. Vegas is just shy of 3 years out from excision of a left breast cancer.  She continues on letrozole.  She is tolerating it well.  We are planning on treating her for a total of 10 years (through 01/2026).  There is no evidence of disease recurrence on either clinical breast exam or bilateral mammograms performed today.  I will see her back in clinic in 6 months.    We reviewed that when breast cancer recurs, it is more commonly in the lymph nodes, bone, liver, and lungs rather than the chest/breast.  We reviewed signs and symptoms of recurrence.  I asked that she contact the clinic with any concerning symptoms.    2.  Bone health:  She is at increased risk of osteoporosis on letrozole.  DEXA in 03/2018 showed normal bone density with a lowest T-score of -0.4.  Continue to take calcium 1200 mg and vitamin D 1000 IU daily as well as walk a half hour daily.      3.  Left rib pain:  Suspect rib fractures sustained in fall experienced in 08/2018.  Pain improving and therefore unlikely to be related to malignancy.  Continue ice/heat prn.      4.  Followup:  Return to clinic in approximately 6 months for visit with me.

## 2018-11-12 ENCOUNTER — ONCOLOGY VISIT (OUTPATIENT)
Dept: ONCOLOGY | Facility: CLINIC | Age: 63
End: 2018-11-12
Attending: INTERNAL MEDICINE
Payer: COMMERCIAL

## 2018-11-12 ENCOUNTER — RADIANT APPOINTMENT (OUTPATIENT)
Dept: MAMMOGRAPHY | Facility: CLINIC | Age: 63
End: 2018-11-12
Payer: COMMERCIAL

## 2018-11-12 VITALS
OXYGEN SATURATION: 98 % | DIASTOLIC BLOOD PRESSURE: 91 MMHG | HEART RATE: 68 BPM | WEIGHT: 150 LBS | SYSTOLIC BLOOD PRESSURE: 133 MMHG | HEIGHT: 62 IN | BODY MASS INDEX: 27.6 KG/M2 | TEMPERATURE: 97.4 F | RESPIRATION RATE: 16 BRPM

## 2018-11-12 DIAGNOSIS — Z12.31 VISIT FOR SCREENING MAMMOGRAM: ICD-10-CM

## 2018-11-12 DIAGNOSIS — C50.812 MALIGNANT NEOPLASM OF OVERLAPPING SITES OF LEFT BREAST IN FEMALE, ESTROGEN RECEPTOR POSITIVE (H): Primary | ICD-10-CM

## 2018-11-12 DIAGNOSIS — Z17.0 MALIGNANT NEOPLASM OF OVERLAPPING SITES OF LEFT BREAST IN FEMALE, ESTROGEN RECEPTOR POSITIVE (H): Primary | ICD-10-CM

## 2018-11-12 DIAGNOSIS — Z12.31 ENCOUNTER FOR SCREENING MAMMOGRAM FOR BREAST CANCER: ICD-10-CM

## 2018-11-12 PROCEDURE — 99213 OFFICE O/P EST LOW 20 MIN: CPT | Mod: ZP | Performed by: INTERNAL MEDICINE

## 2018-11-12 PROCEDURE — G0463 HOSPITAL OUTPT CLINIC VISIT: HCPCS

## 2018-11-12 ASSESSMENT — PAIN SCALES - GENERAL: PAINLEVEL: NO PAIN (0)

## 2018-11-12 NOTE — MR AVS SNAPSHOT
After Visit Summary   11/12/2018    Esther Vegas    MRN: [id number]           Patient Information     Date Of Birth          1955        Visit Information        Provider Department      11/12/2018 4:15 PM Genevieve Rojas MD [facility]        Today's Diagnoses     Malignant neoplasm of overlapping sites of left breast in female, estrogen receptor positive (H)    -  1       Follow-ups after your visit        Your next 10 appointments already scheduled     Dec 04, 2018  3:45 PM CST   MyChart OB-GYN Annual Physical with Yana Diaz MD   [facility] ([facility])    [address] [place] [address]  [address]  [facility] [id number] [phone]            May 13, 2019  4:45 PM CDT   (Arrive by 4:30 PM)   Return Visit with Genevieve Rojas MD   [facility] ([facility])    [address]  [address]  [facility] [address] [phone]              Who to contact     If you have questions or need follow up information about today's clinic visit or your schedule please contact [facility] directly at [phone].  Normal or non-critical lab and imaging results will be communicated to you by MyChart, letter or phone within 4 business days after the clinic has received the results. If you do not hear from us within 7 days, please contact the clinic through [employer] or phone. If you have a critical or abnormal lab result, we will notify you by phone as soon as possible.  Submit refill requests through [employer] or call your pharmacy and they will forward the refill request to us. Please allow 3 business days for your refill to be completed.          Additional Information About Your Visit        MyChart Information     [employer] gives you secure access to your electronic health record. If you see a primary care provider, you can also send messages to your care  "team and make appointments. If you have questions, please call your primary care clinic.  If you do not have a primary care provider, please call [phone] and they will assist you.        Care EveryWhere ID     This is your Care EveryWhere ID. This could be used by other organizations to access your [facility] medical records  [id number]        Your Vitals Were     Pulse Temperature Respirations Height Last Period Pulse Oximetry    68 97.4  F (36.3  C) (Oral) 16 1.581 m (5' 2.24\") 01/25/2007 98%    BMI (Body Mass Index)                   27.22 kg/m2            Blood Pressure from Last 3 Encounters:   11/12/18 (!) 133/91   11/06/18 163/87   07/09/18 124/69    Weight from Last 3 Encounters:   11/12/18 68 kg (150 lb)   11/06/18 68.1 kg (150 lb 3.2 oz)   07/09/18 68.3 kg (150 lb 9 oz)              Today, you had the following     No orders found for display       Primary Care Provider Office Phone # Fax #    Yana Diaz [phone]       [address]  [facility] [address]        Equal Access to Services     [facility] : Hadii aad ku hadasho Soomaali, waaxda luqadaha, qaybta kaalmada adeegyada, pee francisco hayclaudion chi calvert . So [facility] [phone].    ATENCIÓN: Si habla español, tiene a enal disposición servicios gratuitos de asistencia lingüística. Llame al [phone].    We comply with applicable federal civil rights laws and [place] laws. We do not discriminate on the basis of race, color, national origin, age, disability, sex, sexual orientation, or gender identity.            Thank you!     Thank you for choosing [facility]  for your care. Our goal is always to provide you with excellent care. Hearing back from our patients is one way we can continue to improve our services. Please take a few minutes to complete the written survey that you may receive in the mail after your visit with us. Thank you!             Your Updated Medication List - " Protect others around you: Learn how to safely use, store and throw away your medicines at www.disposemymeds.org.          This list is accurate as of 11/12/18 11:59 PM.  Always use your most recent med list.                   Brand Name Dispense Instructions for use Diagnosis    ADVIL PO      Take 400 mg by mouth every 6 hours as needed        calcium-vitamin D 500-125 MG-UNIT Tabs      Take by mouth daily        cetirizine 10 MG tablet    zyrTEC     Take 10 mg by mouth daily        letrozole 2.5 MG tablet    FEMARA    30 tablet    Take 1 tablet (2.5 mg) by mouth daily    Malignant neoplasm of overlapping sites of left breast in female, estrogen receptor positive (H)

## 2018-11-12 NOTE — LETTER
11/12/2018       RE: Esther Vegas  [address] [facility] [address]     Dear Colleague,    Thank you for referring your patient, Esther Vegas, to the [facility]. Please see a copy of my visit note below.    MEDICAL ONCOLOGY FOLLOW-UP PATIENT VISIT     NAME: Esther Vegas     DATE: 11/12/2018    PRIMARY CARE PHYSICIAN: Yana Diaz    PATIENT ID: Grade I, stage IIb, T3N0M0, ER positive, [place] positive, HER2 non-amplified invasive lobular carcinoma of the left breast. Oncotype DX recurrence score = 11.     Oncology History: Ms. Vegas is a 62 year old female with invasive lobular cancer of the left breast. Routine screening mammogram on 10/23/15 demonstrated a spiculated lesion in the upper outer left breast. Ultrasound showed a 0.6 cm mass with indistinct margins at the 1:30 position, 3 cm from the nipple and a second 0.6 cm mass at the 1:00 position, 5 cm from the nipple. Contrast enhanced mammogram then showed non-masslike clumped enhancement in a segmental distribution from the 1 to 3:00 position of the left breast measuring 7.5 cm. Biopsy of the mass at the 1:00 position showed a grade 1 invasive lobular carcinoma and classical type LCIS. Estrogen receptor staining was strongly positive in >95% of tumor cell nuclei. Progesterone receptor staining was moderately positive in 60% of tumor cell nuclei. HER2 was nonamplified by FISH with a HER2 to SAVAGE-17 ratio of 1.2.     Left axillary sentinel lymph node biopsy and left nipple-sparing mastectomy with immediate reconstruction was performed on 12/21/15. Pathology showed invasive lobular carcinoma, [place] grade 2, involving the upper outer quadrant, central breast and lower inner quadrant, size 5.4 cm. There was LCIS, classical type. Surgical margins were negative. A single sentinel LN was negative. [id number] (stage IIB). Tumor was sent for oncotype dx testing which returned with a recurrence score of 11. A recurrence score  of 11 correlates with an 8% risk of distant recurrence in 10 years after treatment with tamoxifen alone.  Adjuvant postmastectomy chest wall radiation was not recommended based on lack of high risk features.  She has been on adjuvant letrozole since 1/13/16.    Interim History:   Ms. Vegas comes into the clinic today for routine breast cancer followup.  She continues on treatment with letrozole.  She continues to have intermittent hot flashes; however, reports them as tolerable.  Likewise, vaginal dryness is also tolerable.  She has no symptoms of depression or anxiety.  Weight has been stable.  In August, she went on a hiking trip in [place].  This was in [place] and [place].  She fell and landed on a rock which struck her left rib.  She immediately had pain.  In the ensuing months, the pain seems to have improved.  She still has tenderness with palpation of this area, however.  She states that the left SI joint pain that she had at previous visit has since resolved.  She denies other new bone or joint aches or pains.  She denies concerning breast lumps or masses.  She has no cough, shortness of breath or chest pain.  She denies current abdominal complaints.  She has no headaches, visual changes or focal neurologic complaints.  The remainder of a complete 12-point review of systems was completed and was otherwise negative.     PAST MEDICAL HISTORY:   Past Medical History:   Diagnosis Date     BCC (basal cell carcinoma of skin)      Breast cancer (H) 2015    left, invasive lobular     GERD (gastroesophageal reflux disease)      Other malignant neoplasm of skin, site unspecified      Tinnitus      Tubular adenoma of colon      Variants of migraine, not elsewhere classified, without mention of intractable migraine without mention of status migrainosus     menstrual migraines       PAST SURGICAL HISTORY:  Past Surgical History:   Procedure Laterality Date     BIOPSY NODE SENTINEL Left 12/21/2015    Procedure:  "BIOPSY NODE SENTINEL;  Surgeon: Francisco Gomez MD;  Location: [facility]     COLONOSCOPY  2006     HEMORRHOIDECTOMY  5/2006     MASTECTOMY, RECONSTRUCT BREAST, COMBINED Left 12/21/2015    Procedure: COMBINED MASTECTOMY, RECONSTRUCT BREAST;  Surgeon: Francisco Gomez MD;  Location: [facility]     MASTOPEXY Right 2/15/2016    Procedure: MASTOPEXY;  Surgeon: RADHA Friedman MD;  Location: [facility]     RECONSTRUCT BREAST Left 12/21/2015    Procedure: RECONSTRUCT BREAST;  Surgeon: Francisco Gomez MD;  [facility]     REMOVE AND REPLACE BREAST IMPLANT PROSTHESIS Left 2/15/2016    Procedure: REMOVE AND REPLACE BREAST IMPLANT PROSTHESIS;  Surgeon: RADHA Friedman MD;  Location: [facility]       MEDS:  Current Outpatient Prescriptions   Medication     calcium-vitamin D 500-125 MG-UNIT TABS     cetirizine (ZYRTEC) 10 MG tablet     Ibuprofen (ADVIL PO)     letrozole (FEMARA) 2.5 MG tablet     No current facility-administered medications for this visit.        ALLERGIES:  Allergies   Allergen Reactions     Bactrim [Sulfamethoxazole W/Trimethoprim]      Facial redness     Codeine      Dizziness and nausea     Codeine      Other reaction(s): Dizziness  Dizziness and nausea     Vicodin [Hydrocodone-Acetaminophen] Nausea        PHYSICAL EXAM:  BP (!) 133/91 (BP Location: Right arm, Patient Position: Chair, Cuff Size: Adult Regular)  Pulse 68  Temp 97.4  F (36.3  C) (Oral)  Resp 16  Ht 1.581 m (5' 2.24\")  Wt 68 kg (150 lb)  LMP 01/25/2007  SpO2 98%  BMI 27.22 kg/m2  LMP 01/25/2007  KPS: 100%  GENERAL: Well nourished, well appearing adult female in no acute distress  HEENT: Normocephalic, atraumatic.  MMM.  EOMI.  No lesions of the oropharynx  LYMPH: No palpable cervical, supraclavicular, or axillary lymphadenopathy  CV: RRR, normal S1/S2, No murmurs, gallops, or rubs  BREAST:  Left breast mastectomy with reconstruction.  Increased fibroglandular density lower inner right breast, but without discretely palpable mass.  Lateral " aspect of right mastopexy incision with mild erythema and 2-3 mm nodule.  There are no dominant masses palpable in the right breast or overlying the left implant.  Right nipple is everted.    LUNGS: CTA B/L, no wheezing or rhonchi  ABDOMEN: soft, nontender, non-distended, no hepatosplenomegaly. Bowel sounds present.  EXTREMITIES: no pitting edema of the bilateral lower extremities.  Full ROM of LUE.  NEURO: Cranial nerves II-XII grossly intact.  Gait stable.  PSYCH:  Mood and affect appear normal.  INTEGUMENT: No visible concerning rashes or lesions.    LABS REVIEWED THIS VISIT:  11/12/18 Bilateral screening mammograms:  No suspicious findings on my view, final radiology read is pending.    IMPRESSION/PLAN: 61 yo female with a h/o T3N0M0, grade I, ER positive, [place] positive, HER2 negative invasive lobular carcinoma of the left breast. She is s/p treatment with left breast mastectomy and has been on letrozole since 01/13/2016.    1.  Left breast estrogen receptor-positive cancer:  Ms. Vegas is just shy of 3 years out from excision of a left breast cancer.  She continues on letrozole.  She is tolerating it well.  We are planning on treating her for a total of 10 years (through 01/2026).  There is no evidence of disease recurrence on either clinical breast exam or bilateral mammograms performed today.  I will see her back in clinic in 6 months.    We reviewed that when breast cancer recurs, it is more commonly in the lymph nodes, bone, liver, and lungs rather than the chest/breast.  We reviewed signs and symptoms of recurrence.  I asked that she contact the clinic with any concerning symptoms.    2.  Bone health:  She is at increased risk of osteoporosis on letrozole.  DEXA in 03/2018 showed normal bone density with a lowest T-score of -0.4.  Continue to take calcium 1200 mg and vitamin D 1000 IU daily as well as walk a half hour daily.      3.  Left rib pain:  Suspect rib fractures sustained in fall experienced in  08/2018.  Pain improving and therefore unlikely to be related to malignancy.  Continue ice/heat prn.      4.  Followup:  Return to clinic in approximately 6 months for visit with me.         Again, thank you for allowing me to participate in the care of your patient.      Sincerely,    Genevieve Rojas MD

## 2018-11-12 NOTE — NURSING NOTE
"Oncology Rooming Note    November 12, 2018 4:05 PM   Esther Vegas is a 62 year old female who presents for:    Chief Complaint   Patient presents with     Oncology Clinic Visit     return - breast ca      Initial Vitals: BP (!) 133/91 (BP Location: Right arm, Patient Position: Chair, Cuff Size: Adult Regular)  Pulse 68  Temp 97.4  F (36.3  C) (Oral)  Resp 16  Ht 1.581 m (5' 2.24\")  Wt 68 kg (150 lb)  LMP 01/25/2007  SpO2 98%  BMI 27.22 kg/m2 Estimated body mass index is 27.22 kg/(m^2) as calculated from the following:    Height as of this encounter: 1.581 m (5' 2.24\").    Weight as of this encounter: 68 kg (150 lb). Body surface area is 1.73 meters squared.  No Pain (0) Comment: Data Unavailable   Patient's last menstrual period was 01/25/2007.  Allergies reviewed: Yes  Medications reviewed: Yes    Medications: Medication refills not needed today.  Pharmacy name entered into [facility]:    [id number] [place], [place] - [address] [place] [address] DRUG STORE [facility] - [address] AT [facility] [address]    Clinical concerns:  No new concerns      6 minutes for nursing intake (face to face time)     Eunice Lui CMA            "

## 2018-12-04 ENCOUNTER — RESULT FOLLOW UP (OUTPATIENT)
Dept: OBGYN | Facility: CLINIC | Age: 63
End: 2018-12-04

## 2018-12-04 ENCOUNTER — OFFICE VISIT (OUTPATIENT)
Dept: OBGYN | Facility: CLINIC | Age: 63
End: 2018-12-04
Payer: COMMERCIAL

## 2018-12-04 VITALS
WEIGHT: 150.3 LBS | BODY MASS INDEX: 27.28 KG/M2 | TEMPERATURE: 97 F | SYSTOLIC BLOOD PRESSURE: 150 MMHG | DIASTOLIC BLOOD PRESSURE: 90 MMHG

## 2018-12-04 DIAGNOSIS — Z12.4 CERVICAL CANCER SCREENING: ICD-10-CM

## 2018-12-04 DIAGNOSIS — R87.615 UNSATISFACTORY CERVICAL CYTOLOGY SMEAR: ICD-10-CM

## 2018-12-04 DIAGNOSIS — Z01.419 ENCOUNTER FOR GYNECOLOGICAL EXAMINATION WITHOUT ABNORMAL FINDING: Primary | ICD-10-CM

## 2018-12-04 PROCEDURE — 87624 HPV HI-RISK TYP POOLED RSLT: CPT | Performed by: OBSTETRICS & GYNECOLOGY

## 2018-12-04 PROCEDURE — G0123 SCREEN CERV/VAG THIN LAYER: HCPCS | Performed by: OBSTETRICS & GYNECOLOGY

## 2018-12-04 PROCEDURE — 99396 PREV VISIT EST AGE 40-64: CPT | Performed by: OBSTETRICS & GYNECOLOGY

## 2018-12-04 NOTE — PROGRESS NOTES
Esther is a 63 year old  who presents for annual exam.   Postmenopausal since 07.  She is having no menopausal symptoms. No vaginal bleeding noted.     Besides routine health maintenance, she has no other health concerns today.  She traveled to [facility] this past year, her  made it to top, she had to go down because of coughing.  Did safari through [facility] after and loved it.  Will be going to [place] this year.  GYNECOLOGIC HISTORY:  Menarche: 11   Age at first intercourse: 27 Number of lifetime partners: <6  She is sexually active with male partner(s) and she is currently in monogamous relationship with .    History sexually transmitted infections:No STD history  STI testing offered?  Declined  Estrogen replacement therapy: No  JOHNNY exposure: No    History of abnormal Pap smear: YES - updated in Problem List and Health Maintenance accordingly  Family history of breast CA: Yes (Please explain): self, sister  Family history of uterine/ovarian CA: No  Family history of colon CA: Yes (Please explain): paternal uncle    HEALTH MAINTENANCE:  Cholesterol: (No components found for: CHOL2 ) History of abnormal lipids: Yes  Mammo: 17 . History of abnormal Mammo: Yes  Regular Self Breast Exams: Yes  Colonoscopy: 11/10/17 History of abnormal Colonoscopy: Yes polyp found  Dexa: 3/5/18 History of abnormal Dexa: no  Calcium/Vitamin D intake: source:  dietary supplement(s) Adequate? Yes  TSH: (No components found for: TSH1 )  Pap; (  Lab Results   Component Value Date    PAP NIL 10/23/2015    PAP NIL 2011    PAP NIL 2010    )    HISTORY:  Obstetric History       T4      L4     SAB0   TAB0   Ectopic0   Multiple0   Live Births4       # Outcome Date GA Lbr Zack/2nd Weight Sex Delivery Anes PTL Lv   8  96 40w0d       CONCETTA   7  90 40w0d       CONCETTA   6  88 40w0d       CONCETTA   5  85 40w0d       CONCETTA   4 Term             3 Term            2 Term            1 Term                 Past Medical History:   Diagnosis Date     BCC (basal cell carcinoma of skin)      Breast cancer (H) 2015    left, invasive lobular     GERD (gastroesophageal reflux disease)      Other malignant neoplasm of skin, site unspecified      Tinnitus      Tubular adenoma of colon      Variants of migraine, not elsewhere classified, without mention of intractable migraine without mention of status migrainosus     menstrual migraines     Past Surgical History:   Procedure Laterality Date     BIOPSY NODE SENTINEL Left 12/21/2015    Procedure: BIOPSY NODE SENTINEL;  Surgeon: Francisco Gomez MD;  [facility]     COLONOSCOPY  2006     HEMORRHOIDECTOMY  5/2006     MASTECTOMY, RECONSTRUCT BREAST, COMBINED Left 12/21/2015    Procedure: COMBINED MASTECTOMY, RECONSTRUCT BREAST;  Surgeon: Francisco Gomez MD;  [facility]     MASTOPEXY Right 2/15/2016    Procedure: MASTOPEXY;  Surgeon: RADHA Friedman MD;  [facility]     RECONSTRUCT BREAST Left 12/21/2015    Procedure: RECONSTRUCT BREAST;  Surgeon: Francisco Gomez MD;  [facility]     REMOVE AND REPLACE BREAST IMPLANT PROSTHESIS Left 2/15/2016    Procedure: REMOVE AND REPLACE BREAST IMPLANT PROSTHESIS;  Surgeon: RADHA Friedman MD;  [facility]     Family History   Problem Relation Age of Onset     C.A.D. Mother      Hypertension Mother      Cerebrovascular Disease Mother      Lipids Mother      high cholesterol     Circulatory Father      Cancer Father      stomach, skin     Breast Cancer Sister      Age 54     Hypertension Brother      Hypertension Sister      Heart Disease Sister      a fib     Breast Cancer Cousin      paternal     Colon Cancer Other      Pateral uncle     Social History     Social History     Marital status:      Spouse name: Yoan     Number of children: 4     Years of education: N/A     Occupational History     RN      Social History Main Topics      Smoking status: Never Smoker     Smokeless tobacco: Never Used     Alcohol use 1.2 - 1.8 oz/week     2 - 3 Standard drinks or equivalent per week      Comment: 5 X week     Drug use: No     Sexual activity: Yes     Partners: Male     Other Topics Concern     Not on file     Social History Narrative    Caffeine intake/servings daily - 2    Calcium intake/servings daily - 3-4    Exercise 3-4 times weekly - describe walking    Sunscreen used - Yes    Seatbelts used - Yes    Guns stored in the home - Yes    Self Breast Exam - Yes    Pap test up to date -  Yes    Eye exam up to date -  Yes    Dental exam up to date -  Yes    DEXA scan up to date -  No    Flex Sig/Colonoscopy up to date -  Yes and 2005/2006?    Mammography up to date -  Yes 9/11 neg    Immunizations reviewed and up to date - No    Abuse: Current or Past (Physical, Sexual or Emotional) - No    Do you feel safe in your environment - Yes    Do you cope well with stress - Yes    Do you suffer from insomnia - No    Last updated by: Joanne Gilligan RN 11/23/11                                   Current Outpatient Prescriptions:      calcium-vitamin D 500-125 MG-UNIT TABS, Take by mouth daily, Disp: , Rfl:      cetirizine (ZYRTEC) 10 MG tablet, Take 10 mg by mouth daily, Disp: , Rfl:      Ibuprofen (ADVIL PO), Take 400 mg by mouth every 6 hours as needed , Disp: , Rfl:      letrozole (FEMARA) 2.5 MG tablet, Take 1 tablet (2.5 mg) by mouth daily, Disp: 30 tablet, Rfl: 11     Allergies   Allergen Reactions     Bactrim [Sulfamethoxazole W/Trimethoprim]      Facial redness     Codeine      Dizziness and nausea     Vicodin [Hydrocodone-Acetaminophen] Nausea       Past medical, surgical, social and family history were reviewed and updated in EPIC.    ROS:   C:       NEGATIVE for fever, chills, change in weight  I:         NEGATIVE for worrisome rashes, moles or lesions  E:       NEGATIVE for vision changes or irritation  E/M:   NEGATIVE for ear, mouth and throat  problems  R:       NEGATIVE for significant cough or SOB  CV:     NEGATIVE for chest pain, palpitations or peripheral edema  GI:      NEGATIVE for nausea, abdominal pain, heartburn, or change in bowel habits  :    NEGATIVE for frequency, dysuria, hematuria, vaginal discharge, or bleeding  M:       NEGATIVE for significant arthralgias or myalgia  N:       NEGATIVE for weakness, dizziness or paresthesias  E:       NEGATIVE for temperature intolerance, skin/hair changes  P:       NEGATIVE for changes in mood or affect    EXAM:  /90  Temp 97  F (36.1  C) (Oral)  Wt 150 lb 4.8 oz (68.2 kg)  LMP 01/25/2007  BMI 27.28 kg/m2   BMI: Body mass index is 27.28 kg/(m^2).  Constitutional: healthy, alert and no distress  Head: Normocephalic. No masses, lesions, tenderness or abnormalities  Neck: Neck supple. Trachea midline. No adenopathy. Thyroid symmetric, normal size.   Cardiovascular: RRR.   Respiratory: Negative.   Breast: asymmetrical due to implant in right breast.  No masses or dimpling bilaterally.  Gastrointestinal: Abdomen soft, non-tender, non-distended. No masses, organomegaly  :  Vulva:  No external lesions, normal female hair distribution, no inguinal adenopathy.    Urethra:  Midline, non-tender, well supported, no discharge  Vagina:  Atrophic, no abnormal discharge, no lesions  Uterus:  Normal size, non-tender, freely mobile  Ovaries:  No masses appreciated, non-tender, mobile  Rectal Exam: deferred  Musculoskeletal: extremities normal  Skin: no suspicious lesions or rashes  Psychiatric: Affect appropriate, cooperative,mentation appears normal.     COUNSELING:   Reviewed preventive health counseling, as reflected in patient instructions  Special attention given to:        Regular exercise       Healthy diet/nutrition       Osteoporosis Prevention/Bone Health       (Jessica)menopause management   reports that she has never smoked. She has never used smokeless tobacco.    Body mass index is 27.28  kg/(m^2).  Weight management plan: Discussed healthy diet and exercise guidelines    FRAX Risk Assessment  ASSESSMENT:  63 year old  with satisfactory annual exam  Plan: cotesting done, if nil/neg no more paps indicated.  Labs with PCP.  Mammo/imaging with oncologist.  Colonoscopy UTD.    ARTIE HILARIO MD

## 2018-12-04 NOTE — LETTER
May 21, 2019      Mariela Shasta  [address]  [facility] [address]    Dear MsEvangelistShasta,      This letter is to remind you that you are due for your follow up PAP smear on or about 06/04/19.    Please call [phone] to schedule your appointment at your earliest convenience.     If you have completed the tests outside of [place], please have the results forwarded to our office. We will update the chart for your primary Physician to review before your next annual physical.     Sincerely,      Your [place] Care Team/[facility]

## 2018-12-04 NOTE — NURSING NOTE
"Chief Complaint   Patient presents with     Physical     annual and pap       Initial /90  Temp 97  F (36.1  C) (Oral)  Wt 150 lb 4.8 oz (68.2 kg)  LMP 2007  BMI 27.28 kg/m2 Estimated body mass index is 27.28 kg/(m^2) as calculated from the following:    Height as of 18: 5' 2.24\" (1.581 m).    Weight as of this encounter: 150 lb 4.8 oz (68.2 kg).  BP completed using cuff size: regular    Questioned patient about current smoking habits.  Pt. has never smoked.          The following HM Due: pap smear      The following patient reported/Care Every where data was sent to:  P ABSTRACT QUALITY INITIATIVES [[id number]]        patient has appointment for today  Janice Monteiro                "

## 2018-12-04 NOTE — MR AVS SNAPSHOT
After Visit Summary   12/4/2018    Esther Vegas    MRN: [id number]           Patient Information     Date Of Birth          1955        Visit Information        Provider Department      12/4/2018 3:45 PM Yana Diaz MD [facility]        Today's Diagnoses     Encounter for gynecological examination without abnormal finding    -  1       Follow-ups after your visit        Your next 10 appointments already scheduled     May 13, 2019  4:45 PM CDT   (Arrive by 4:30 PM)   Return Visit with Genevieve Rojas MD   [facility] ([facility] [place])    [address] [place] [address]  [address]  [facility] [address] [phone]              Who to contact     If you have questions or need follow up information about today's clinic visit or your schedule please contact [facility] directly at [phone].  Normal or non-critical lab and imaging results will be communicated to you by MyChart, letter or phone within 4 business days after the clinic has received the results. If you do not hear from us within 7 days, please contact the clinic through MyChart or phone. If you have a critical or abnormal lab result, we will notify you by phone as soon as possible.  Submit refill requests through [employer] or call your pharmacy and they will forward the refill request to us. Please allow 3 business days for your refill to be completed.          Additional Information About Your Visit        MyChart Information     [employer] gives you secure access to your electronic health record. If you see a primary care provider, you can also send messages to your care team and make appointments. If you have questions, please call your primary care clinic.  If you do not have a primary care provider, please call [phone] and they will assist you.        Care EveryWhere ID     This is your Care EveryWhere ID. This could be used by other  organizations to access your [place] medical records  [id number]        Your Vitals Were     Temperature Last Period BMI (Body Mass Index)             97  F (36.1  C) (Oral) 01/25/2007 27.28 kg/m2          Blood Pressure from Last 3 Encounters:   12/04/18 150/90   11/12/18 (!) 133/91   11/06/18 163/87    Weight from Last 3 Encounters:   12/04/18 150 lb 4.8 oz (68.2 kg)   11/12/18 150 lb (68 kg)   11/06/18 150 lb 3.2 oz (68.1 kg)              Today, you had the following     No orders found for display       Primary Care Provider Office Phone # Fax #    Yana Diaz [phone]       [address] [facility] [address]  [facility] [address]        Equal Access to Services     [facility] CenterBRUNILDA : Hadii priscilla florence hadasho Soomaali, waaxda luqadaha, qaybta kaalmada adeporscheyada, pee calvert . So [facility] [phone].    ATENCIÓN: Si habla español, tiene a neal disposición servicios gratuitos de asistencia lingüística. Llame al [phone].    We comply with applicable federal civil rights laws and [place] laws. We do not discriminate on the basis of race, color, national origin, age, disability, sex, sexual orientation, or gender identity.            Thank you!     Thank you for choosing [facility]  for your care. Our goal is always to provide you with excellent care. Hearing back from our patients is one way we can continue to improve our services. Please take a few minutes to complete the written survey that you may receive in the mail after your visit with us. Thank you!             Your Updated Medication List - Protect others around you: Learn how to safely use, store and throw away your medicines at www.disposemymeds.org.          This list is accurate as of 12/4/18  4:07 PM.  Always use your most recent med list.                   Brand Name Dispense Instructions for use Diagnosis    ADVIL PO      Take 400 mg by mouth every 6 hours as needed        calcium-vitamin D  500-125 MG-UNIT Tabs      Take by mouth daily        cetirizine 10 MG tablet    zyrTEC     Take 10 mg by mouth daily        letrozole 2.5 MG tablet    FEMARA    30 tablet    Take 1 tablet (2.5 mg) by mouth daily    Malignant neoplasm of overlapping sites of left breast in female, estrogen receptor positive (H)

## 2018-12-04 NOTE — LETTER
May 21, 2019      Esther Vegas  [address] [facility] [address]    Dear ,    This letter is to remind you that the result of your recent Pap smear was reported as  unsatisfactory for evaluation.  This means that there were not enough cells collected to evaluate your sample.      Your provider had recommended that you have a repeat Pap smear completed by 06/04/19. Our records do not show that this has been scheduled.    Please contact our office at [phone] to schedule an appointment for a Pap smear at your earliest convenience. If you have questions or concerns, please call the clinic and we will be happy to assist you.    If you have completed the tests outside of [place], please have the results forwarded to our office. We will update the chart for your primary Physician to review before your next annual physical.     Thank you for choosing [place]!      Sincerely,      Your [place] Care Team/[facility]

## 2018-12-04 NOTE — LETTER
February 11, 2019      Esther Vegas  [address] [facility] [address]    Dear ,      This letter is to remind you that the result of your recent pap smear was reported as  unsatisfactory for evaluation.  This means that there were not enough cells collected to evaluate your sample. Your provider had recommended you repeat your pap smear within 2-4 months. Our records do not show that this has been done or scheduled.    Please call the clinic at [phone] to schedule a repeat pap smear at your earliest convenience.    If you have additional questions regarding this result, please call our registered nurse, Mara at [phone].    Sincerely,      Yana Diaz MD/[facility]

## 2018-12-04 NOTE — LETTER
April 9, 2019      Esther Vegas  [address] [facility] [address]    Dear ,    This letter is to remind you that the result of your recent Pap smear was reported as  unsatisfactory for evaluation.  This means that there were not enough cells collected to evaluate your sample.      Your provider had recommended that you have a repeat Pap smear completed by 04/04/19. Our records do not show that this has been done.    Please contact our office at [phone] to schedule an appointment for a Pap smear at your earliest convenience. If you have questions or concerns, please call the clinic and we will be happy to assist you.    If you have completed the tests outside of [place], please have the results forwarded to our office. We will update the chart for your primary Physician to review before your next annual physical.     Thank you for choosing [place]!      Sincerely,      Your [place] Care Team/[facility]

## 2018-12-10 LAB
COPATH REPORT: NORMAL
PAP: NORMAL

## 2018-12-11 LAB
FINAL DIAGNOSIS: NORMAL
HPV HR 12 DNA CVX QL NAA+PROBE: NEGATIVE
HPV16 DNA SPEC QL NAA+PROBE: NEGATIVE
HPV18 DNA SPEC QL NAA+PROBE: NEGATIVE
SPECIMEN DESCRIPTION: NORMAL
SPECIMEN SOURCE CVX/VAG CYTO: NORMAL

## 2018-12-31 DIAGNOSIS — Z17.0 MALIGNANT NEOPLASM OF OVERLAPPING SITES OF LEFT BREAST IN FEMALE, ESTROGEN RECEPTOR POSITIVE (H): ICD-10-CM

## 2018-12-31 DIAGNOSIS — C50.812 MALIGNANT NEOPLASM OF OVERLAPPING SITES OF LEFT BREAST IN FEMALE, ESTROGEN RECEPTOR POSITIVE (H): ICD-10-CM

## 2018-12-31 RX ORDER — LETROZOLE 2.5 MG/1
TABLET, FILM COATED ORAL
Qty: 30 TABLET | Refills: 5 | Status: SHIPPED | OUTPATIENT
Start: 2018-12-31 | End: 2019-06-29

## 2019-04-04 NOTE — PROGRESS NOTES
12/04/18: Unsatisfactory pap, Neg HR HPV result. Plan repeat pap in 2-4 months.   12/13/18:Msg left to call back.   12/18/18: Msg left to call back. Pt was advised.  2/11/19: Reminder letter sent.   04/09/19 Pap reminder letter sent. ([facility])  05/01/19 Repeat pap not done, tracking updated for 6 month pap. ([facility])  05/21/19 Pap reminder letter sent. ([facility])  06/26/19 NIL pap. Plan: no further paps. ([facility])

## 2019-05-01 PROBLEM — R87.615 UNSATISFACTORY CERVICAL CYTOLOGY SMEAR: Status: ACTIVE | Noted: 2018-12-04

## 2019-05-11 NOTE — PROGRESS NOTES
MEDICAL ONCOLOGY FOLLOW-UP PATIENT VISIT     NAME: Esther Vegas     DATE: 5/13/2019    PRIMARY CARE PHYSICIAN: Yana Diaz    PATIENT ID: Grade I, stage IIb, T3N0M0, ER positive, MO positive, HER2 non-amplified invasive lobular carcinoma of the left breast. Oncotype DX recurrence score = 11.     Oncology History: Ms. Vegas is a 63 year old female with a h/o invasive lobular cancer of the left breast. Routine screening mammogram on 10/23/15 demonstrated a spiculated lesion in the upper outer left breast. Ultrasound showed a 0.6 cm mass with indistinct margins at the 1:30 position, 3 cm from the nipple and a second 0.6 cm mass at the 1:00 position, 5 cm from the nipple. Contrast enhanced mammogram then showed non-masslike clumped enhancement in a segmental distribution from the 1 to 3:00 position of the left breast measuring 7.5 cm. Biopsy of the mass at the 1:00 position showed a grade 1 invasive lobular carcinoma and classical type LCIS. Estrogen receptor staining was strongly positive in >95% of tumor cell nuclei. Progesterone receptor staining was moderately positive in 60% of tumor cell nuclei. HER2 was nonamplified by FISH with a HER2 to SAVAGE-17 ratio of 1.2.     Left axillary sentinel lymph node biopsy and left nipple-sparing mastectomy with immediate reconstruction was performed on 12/21/15. Pathology showed invasive lobular carcinoma, [place] grade 2, involving the upper outer quadrant, central breast and lower inner quadrant, size 5.4 cm. There was LCIS, classical type. Surgical margins were negative. A single sentinel LN was negative. [id number]. Oncotype dx recurrence score was 11.  Adjuvant postmastectomy chest wall radiation was not recommended based on lack of high risk features.  She has been on adjuvant letrozole since 1/13/16.    Interim History:   Ms. Vegas comes into clinic today for routine breast cancer followup.  She continues on treatment with letrozole.  She is tolerating the  medication remarkably well.  She has intermittent hot flashes, however, describes them as tolerable.  She denies significant bone or joint aches or pains.  Since our last visit, she went on an 18-day hiking trip in [place].  She states overall this trip went very well.  She denies concerning lumps or masses of either breast.  She has no cough, shortness of breath or chest pain.  She has no abdominal complaints, no headaches, visual changes or focal neurologic complaints.  She does mention that over the winter while at a wedding in [place] she slipped and fell and injured her knee.  She has since been wearing a knee brace which seems to have relieved the pain. The remainder of a complete 12-point review of systems was reviewed with the patient and was negative with the exception of that mentioned above.      PAST MEDICAL HISTORY:   Past Medical History:   Diagnosis Date     BCC (basal cell carcinoma of skin)      Breast cancer (H) 2015    left, invasive lobular     GERD (gastroesophageal reflux disease)      Other malignant neoplasm of skin, site unspecified      Tinnitus      Tubular adenoma of colon      Unsatisfactory cervical cytology smear 12/04/2018 12/04/18: See problem list.      Variants of migraine, not elsewhere classified, without mention of intractable migraine without mention of status migrainosus     menstrual migraines       PAST SURGICAL HISTORY:  Past Surgical History:   Procedure Laterality Date     BIOPSY NODE SENTINEL Left 12/21/2015    Procedure: BIOPSY NODE SENTINEL;  Surgeon: Francisco Gomez MD;  [facility]     COLONOSCOPY  2006     HEMORRHOIDECTOMY  5/2006     MASTECTOMY, RECONSTRUCT BREAST, COMBINED Left 12/21/2015    Procedure: COMBINED MASTECTOMY, RECONSTRUCT BREAST;  Surgeon: Francisco Gomez MD;  [facility]     MASTOPEXY Right 2/15/2016    Procedure: MASTOPEXY;  Surgeon: RADHA Friedman MD;  [facility]     RECONSTRUCT BREAST Left 12/21/2015    Procedure:  "RECONSTRUCT BREAST;  Surgeon: Francisco Gomez MD;  [facility]     REMOVE AND REPLACE BREAST IMPLANT PROSTHESIS Left 2/15/2016    Procedure: REMOVE AND REPLACE BREAST IMPLANT PROSTHESIS;  Surgeon: RADHA Friedman MD;  [facility]       MEDS:  Current Outpatient Medications   Medication     calcium-vitamin D 500-125 MG-UNIT TABS     cetirizine (ZYRTEC) 10 MG tablet     Ibuprofen (ADVIL PO)     letrozole (FEMARA) 2.5 MG tablet     No current facility-administered medications for this visit.        ALLERGIES:  Allergies   Allergen Reactions     Bactrim [Sulfamethoxazole W/Trimethoprim]      Facial redness     Codeine      Dizziness and nausea     Vicodin [Hydrocodone-Acetaminophen] Nausea        PHYSICAL EXAM:  BP (!) 147/99 (BP Location: Right arm, Patient Position: Sitting, Cuff Size: Adult Regular)   Pulse 82   Temp 97.8  F (36.6  C) (Oral)   Resp 16   Ht 1.581 m (5' 2.24\")   Wt 69.8 kg (153 lb 14.4 oz)   LMP 01/25/2007   SpO2 97%   BMI 27.93 kg/m    KPS: 100%  GENERAL: Well nourished, well appearing adult female in no acute distress.  A&Ox3.  HEENT: Normocephalic, atraumatic.  MMM.  EOMI.  No lesions of the oropharynx  LYMPH: No palpable cervical, supraclavicular, or axillary lymphadenopathy  CV: RRR, normal S1/S2, No murmurs, gallops, or rubs  BREAST:  Left breast mastectomy with reconstruction.  There are no discretely palpable masses in either the right breast or overlying the left implant.  Right nipple is everted.    LUNGS: CTA B/L, no wheezing or rhonchi  ABDOMEN: soft, nontender, non-distended. Bowel sounds normoactive.  EXTREMITIES: no pitting edema of the bilateral lower extremities.  Full ROM of the bilateral upper extremities.  NEURO: Cranial nerves II-XII grossly intact.  Gait stable.  PSYCH:  Mood and affect appear normal.  INTEGUMENT: No visible concerning skin rashes or lesions.    LABS REVIEWED THIS VISIT:  No interim imaging or laboratory studies pertinent to today's " visit.    IMPRESSION/PLAN: 62 yo female with a h/o T3N0M0, grade I, ER positive, GA positive, HER2 negative invasive lobular carcinoma of the left breast. She is s/p treatment with left breast mastectomy and has been on letrozole since 01/13/2016.    1.  Left breast estrogen receptor-positive cancer:  Ms. Vegas is 3.5 years out from excision of a left breast cancer.  She continues on letrozole.  She is tolerating it well.  We are planning on treating her for a total of 10 years (through 01/2026).  There is no evidence of disease recurrence on exam today.  I will see her back in clinic in 6 months.  She will be due for right breast screening mammogram at that time.    2.  Bone health:  She is at increased risk of osteoporosis on letrozole.  DEXA in 03/2018 showed normal bone density with a lowest T-score of -0.4.  Continue to take calcium 1200 mg and vitamin D 1000 IU daily as well as walk a half hour daily.  Will repeat a DEXA in 03/2020.      3.  Followup:  Return to clinic in approximately 6 months for right breast screening mammogram and visit with me.

## 2019-05-13 ENCOUNTER — ONCOLOGY VISIT (OUTPATIENT)
Dept: ONCOLOGY | Facility: CLINIC | Age: 64
End: 2019-05-13
Attending: INTERNAL MEDICINE
Payer: COMMERCIAL

## 2019-05-13 VITALS
WEIGHT: 153.9 LBS | DIASTOLIC BLOOD PRESSURE: 99 MMHG | HEART RATE: 82 BPM | HEIGHT: 62 IN | RESPIRATION RATE: 16 BRPM | SYSTOLIC BLOOD PRESSURE: 147 MMHG | OXYGEN SATURATION: 97 % | BODY MASS INDEX: 28.32 KG/M2 | TEMPERATURE: 97.8 F

## 2019-05-13 DIAGNOSIS — Z17.0 MALIGNANT NEOPLASM OF OVERLAPPING SITES OF LEFT BREAST IN FEMALE, ESTROGEN RECEPTOR POSITIVE (H): Primary | ICD-10-CM

## 2019-05-13 DIAGNOSIS — C50.812 MALIGNANT NEOPLASM OF OVERLAPPING SITES OF LEFT BREAST IN FEMALE, ESTROGEN RECEPTOR POSITIVE (H): Primary | ICD-10-CM

## 2019-05-13 DIAGNOSIS — Z12.31 VISIT FOR SCREENING MAMMOGRAM: ICD-10-CM

## 2019-05-13 PROCEDURE — 99213 OFFICE O/P EST LOW 20 MIN: CPT | Mod: ZP | Performed by: INTERNAL MEDICINE

## 2019-05-13 PROCEDURE — G0463 HOSPITAL OUTPT CLINIC VISIT: HCPCS | Mod: ZF

## 2019-05-13 ASSESSMENT — PAIN SCALES - GENERAL: PAINLEVEL: NO PAIN (0)

## 2019-05-13 ASSESSMENT — MIFFLIN-ST. JEOR: SCORE: 1205.21

## 2019-05-13 NOTE — LETTER
5/13/2019       RE: Esther Vegas  [address] [facility] [address]     Dear Colleague,    Thank you for referring your patient, Esther Vegas, to the [facility]. Please see a copy of my visit note below.    MEDICAL ONCOLOGY FOLLOW-UP PATIENT VISIT     NAME: Esther Vegas     DATE: 5/13/2019    PRIMARY CARE PHYSICIAN: Yana Diaz    PATIENT ID: Grade I, stage IIb, T3N0M0, ER positive, KS positive, HER2 non-amplified invasive lobular carcinoma of the left breast. Oncotype DX recurrence score = 11.     Oncology History: Ms. Vegas is a 63 year old female with a h/o invasive lobular cancer of the left breast. Routine screening mammogram on 10/23/15 demonstrated a spiculated lesion in the upper outer left breast. Ultrasound showed a 0.6 cm mass with indistinct margins at the 1:30 position, 3 cm from the nipple and a second 0.6 cm mass at the 1:00 position, 5 cm from the nipple. Contrast enhanced mammogram then showed non-masslike clumped enhancement in a segmental distribution from the 1 to 3:00 position of the left breast measuring 7.5 cm. Biopsy of the mass at the 1:00 position showed a grade 1 invasive lobular carcinoma and classical type LCIS. Estrogen receptor staining was strongly positive in >95% of tumor cell nuclei. Progesterone receptor staining was moderately positive in 60% of tumor cell nuclei. HER2 was nonamplified by FISH with a HER2 to SAVAGE-17 ratio of 1.2.     Left axillary sentinel lymph node biopsy and left nipple-sparing mastectomy with immediate reconstruction was performed on 12/21/15. Pathology showed invasive lobular carcinoma, Freeland grade 2, involving the upper outer quadrant, central breast and lower inner quadrant, size 5.4 cm. There was LCIS, classical type. Surgical margins were negative. A single sentinel LN was negative. [id number]. Oncotype dx recurrence score was 11.  Adjuvant postmastectomy chest wall radiation was not recommended based on  lack of high risk features.  She has been on adjuvant letrozole since 1/13/16.    Interim History:   Ms. Vegas comes into clinic today for routine breast cancer followup.  She continues on treatment with letrozole.  She is tolerating the medication remarkably well.  She has intermittent hot flashes, however, describes them as tolerable.  She denies significant bone or joint aches or pains.  Since our last visit, she went on an 18-day hiking trip in [place].  She states overall this trip went very well.  She denies concerning lumps or masses of either breast.  She has no cough, shortness of breath or chest pain.  She has no abdominal complaints, no headaches, visual changes or focal neurologic complaints.  She does mention that over the winter while at a wedding in [place] she slipped and fell and injured her knee.  She has since been wearing a knee brace which seems to have relieved the pain. The remainder of a complete 12-point review of systems was reviewed with the patient and was negative with the exception of that mentioned above.      PAST MEDICAL HISTORY:   Past Medical History:   Diagnosis Date     BCC (basal cell carcinoma of skin)      Breast cancer (H) 2015    left, invasive lobular     GERD (gastroesophageal reflux disease)      Other malignant neoplasm of skin, site unspecified      Tinnitus      Tubular adenoma of colon      Unsatisfactory cervical cytology smear 12/04/2018 12/04/18: See problem list.      Variants of migraine, not elsewhere classified, without mention of intractable migraine without mention of status migrainosus     menstrual migraines       PAST SURGICAL HISTORY:  Past Surgical History:   Procedure Laterality Date     BIOPSY NODE SENTINEL Left 12/21/2015    Procedure: BIOPSY NODE SENTINEL;  Surgeon: Francisco Gomez MD;  Location: [facility] OR     COLONOSCOPY  2006     HEMORRHOIDECTOMY  5/2006     MASTECTOMY, RECONSTRUCT BREAST, COMBINED Left 12/21/2015    Procedure: COMBINED  "MASTECTOMY, RECONSTRUCT BREAST;  Surgeon: Francisco Gomez MD;  Location: [facility] OR     MASTOPEXY Right 2/15/2016    Procedure: MASTOPEXY;  Surgeon: RADHA Friedman MD;  Location: [facility]     RECONSTRUCT BREAST Left 12/21/2015    Procedure: RECONSTRUCT BREAST;  Surgeon: Francisco Gomez MD;  Location: [facility]     REMOVE AND REPLACE BREAST IMPLANT PROSTHESIS Left 2/15/2016    Procedure: REMOVE AND REPLACE BREAST IMPLANT PROSTHESIS;  Surgeon: RADHA Friedman MD;  Location: [facility] OR       MEDS:  Current Outpatient Medications   Medication     calcium-vitamin D 500-125 MG-UNIT TABS     cetirizine (ZYRTEC) 10 MG tablet     Ibuprofen (ADVIL PO)     letrozole (FEMARA) 2.5 MG tablet     No current facility-administered medications for this visit.        ALLERGIES:  Allergies   Allergen Reactions     Bactrim [Sulfamethoxazole W/Trimethoprim]      Facial redness     Codeine      Dizziness and nausea     Vicodin [Hydrocodone-Acetaminophen] Nausea        PHYSICAL EXAM:  BP (!) 147/99 (BP Location: Right arm, Patient Position: Sitting, Cuff Size: Adult Regular)   Pulse 82   Temp 97.8  F (36.6  C) (Oral)   Resp 16   Ht 1.581 m (5' 2.24\")   Wt 69.8 kg (153 lb 14.4 oz)   LMP 01/25/2007   SpO2 97%   BMI 27.93 kg/m     KPS: 100%  GENERAL: Well nourished, well appearing adult female in no acute distress.  A&Ox3.  HEENT: Normocephalic, atraumatic.  MMM.  EOMI.  No lesions of the oropharynx  LYMPH: No palpable cervical, supraclavicular, or axillary lymphadenopathy  CV: RRR, normal S1/S2, No murmurs, gallops, or rubs  BREAST:  Left breast mastectomy with reconstruction.  There are no discretely palpable masses in either the right breast or overlying the left implant.  Right nipple is everted.    LUNGS: CTA B/L, no wheezing or rhonchi  ABDOMEN: soft, nontender, non-distended. Bowel sounds normoactive.  EXTREMITIES: no pitting edema of the bilateral lower extremities.  Full ROM of the bilateral upper extremities.  NEURO: Cranial " nerves II-XII grossly intact.  Gait stable.  PSYCH:  Mood and affect appear normal.  INTEGUMENT: No visible concerning skin rashes or lesions.    LABS REVIEWED THIS VISIT:  No interim imaging or laboratory studies pertinent to today's visit.    IMPRESSION/PLAN: 62 yo female with a h/o T3N0M0, grade I, ER positive, NE positive, HER2 negative invasive lobular carcinoma of the left breast. She is s/p treatment with left breast mastectomy and has been on letrozole since 01/13/2016.    1.  Left breast estrogen receptor-positive cancer:  Ms. Vegas is 3.5 years out from excision of a left breast cancer.  She continues on letrozole.  She is tolerating it well.  We are planning on treating her for a total of 10 years (through 01/2026).  There is no evidence of disease recurrence on exam today.  I will see her back in clinic in 6 months.  She will be due for right breast screening mammogram at that time.    2.  Bone health:  She is at increased risk of osteoporosis on letrozole.  DEXA in 03/2018 showed normal bone density with a lowest T-score of -0.4.  Continue to take calcium 1200 mg and vitamin D 1000 IU daily as well as walk a half hour daily.  Will repeat a DEXA in 03/2020.      3.  Followup:  Return to clinic in approximately 6 months for right breast screening mammogram and visit with me.           Again, thank you for allowing me to participate in the care of your patient.      Sincerely,    Genevieve Rojas MD

## 2019-05-13 NOTE — NURSING NOTE
"Oncology Rooming Note    May 13, 2019 4:33 PM   Esther Vegas is a 63 year old female who presents for:    Chief Complaint   Patient presents with     RECHECK     breast ca      Initial Vitals: LMP 01/25/2007  Estimated body mass index is 27.28 kg/m  as calculated from the following:    Height as of 11/12/18: 1.581 m (5' 2.24\").    Weight as of 12/4/18: 68.2 kg (150 lb 4.8 oz). There is no height or weight on file to calculate BSA.  Data Unavailable Comment: Data Unavailable   Patient's last menstrual period was 01/25/2007.  Allergies reviewed: Yes  Medications reviewed: Yes    Medications: Medication refills not needed today.  Pharmacy name entered into [employer]: [id number] - [place], [place] - [facility] [address] AT [facility] [address]    Clinical concerns: none        Yamile Dilan, CMA              "

## 2019-06-26 ENCOUNTER — OFFICE VISIT (OUTPATIENT)
Dept: OBGYN | Facility: CLINIC | Age: 64
End: 2019-06-26
Payer: COMMERCIAL

## 2019-06-26 VITALS
SYSTOLIC BLOOD PRESSURE: 128 MMHG | BODY MASS INDEX: 27.95 KG/M2 | DIASTOLIC BLOOD PRESSURE: 84 MMHG | WEIGHT: 154 LBS | HEART RATE: 89 BPM

## 2019-06-26 DIAGNOSIS — R87.615 UNSATISFACTORY CERVICAL PAPANICOLAOU SMEAR: Primary | ICD-10-CM

## 2019-06-26 PROCEDURE — 99212 OFFICE O/P EST SF 10 MIN: CPT | Performed by: OBSTETRICS & GYNECOLOGY

## 2019-06-26 PROCEDURE — G0145 SCR C/V CYTO,THINLAYER,RESCR: HCPCS | Performed by: OBSTETRICS & GYNECOLOGY

## 2019-06-26 NOTE — NURSING NOTE
"Chief Complaint   Patient presents with     Follow Up     pap smear       Initial /84   Pulse 89   Wt 69.9 kg (154 lb)   LMP 2007   BMI 27.95 kg/m   Estimated body mass index is 27.95 kg/m  as calculated from the following:    Height as of 19: 1.581 m (5' 2.24\").    Weight as of this encounter: 69.9 kg (154 lb).  BP completed using cuff size: regular    Questioned patient about current smoking habits.  Pt. has never smoked.          The following HM Due: pap smear      The following patient reported/Care Every where data was sent to:  P ABSTRACT QUALITY INITIATIVES [[id number]]        patient has appointment for today  Janice Monteiro                "

## 2019-06-26 NOTE — PROGRESS NOTES
S; Esther Vegas is a 63 year old  who is here for pap smear.  She was seen about 6 months ago for annual and pap and pap showed scant cellularity with negative HPV.  She reports no issues.    O: /84   Pulse 89   Wt 69.9 kg (154 lb)   LMP 2007   BMI 27.95 kg/m      Gen: NAD  Abd: SNT  Pelvic: atrophic vulva and vagina.  Cervix without lesions or CMT.  Pap done  Ex; no c/c/e    A/P: repeat pap due to scant cellularity   Aggressive pap done.  We discussed that if this pap is also scant cellularity, she could opt to forego any further sampling as she was hesitant to come today given her history of all normal paps and same partner for >30yrs.  I explained that if I didn't get enough cells with this pap, I am not sure another will make a difference and she will likely not opt for repeat.  If normal, she would no longer need paps.    ARTIE HILARIO MD

## 2019-06-29 DIAGNOSIS — C50.812 MALIGNANT NEOPLASM OF OVERLAPPING SITES OF LEFT BREAST IN FEMALE, ESTROGEN RECEPTOR POSITIVE (H): ICD-10-CM

## 2019-06-29 DIAGNOSIS — Z17.0 MALIGNANT NEOPLASM OF OVERLAPPING SITES OF LEFT BREAST IN FEMALE, ESTROGEN RECEPTOR POSITIVE (H): ICD-10-CM

## 2019-07-01 RX ORDER — LETROZOLE 2.5 MG/1
TABLET, FILM COATED ORAL
Qty: 30 TABLET | Refills: 0 | Status: SHIPPED | OUTPATIENT
Start: 2019-07-01 | End: 2019-07-02

## 2019-07-02 DIAGNOSIS — C50.812 MALIGNANT NEOPLASM OF OVERLAPPING SITES OF LEFT BREAST IN FEMALE, ESTROGEN RECEPTOR POSITIVE (H): ICD-10-CM

## 2019-07-02 DIAGNOSIS — Z17.0 MALIGNANT NEOPLASM OF OVERLAPPING SITES OF LEFT BREAST IN FEMALE, ESTROGEN RECEPTOR POSITIVE (H): ICD-10-CM

## 2019-07-02 LAB
COPATH REPORT: NORMAL
PAP: NORMAL

## 2019-07-02 RX ORDER — LETROZOLE 2.5 MG/1
2.5 TABLET, FILM COATED ORAL DAILY
Qty: 90 TABLET | Refills: 3 | Status: SHIPPED | OUTPATIENT
Start: 2019-07-02 | End: 2020-07-23

## 2019-07-08 PROBLEM — R87.615 UNSATISFACTORY CERVICAL CYTOLOGY SMEAR: Status: RESOLVED | Noted: 2018-12-04 | Resolved: 2019-07-08

## 2019-09-28 ENCOUNTER — HEALTH MAINTENANCE LETTER (OUTPATIENT)
Age: 64
End: 2019-09-28

## 2019-11-17 NOTE — PROGRESS NOTES
MEDICAL ONCOLOGY FOLLOW-UP PATIENT VISIT     NAME: Esther Vegas     DATE: 11/18/2019    PRIMARY CARE PHYSICIAN: Yana Diaz    PATIENT ID: Grade I, stage IIb, T3N0M0, ER positive, MT positive, HER2 non-amplified invasive lobular carcinoma of the left breast. Oncotype DX recurrence score = 11.     Oncology History: Ms. Vegas is a 64 year old female with a h/o invasive lobular cancer of the left breast. Routine screening mammogram on 10/23/15 demonstrated a spiculated lesion in the upper outer left breast. Ultrasound showed a 0.6 cm mass with indistinct margins at the 1:30 position, 3 cm from the nipple and a second 0.6 cm mass at the 1:00 position, 5 cm from the nipple. Contrast enhanced mammogram then showed non-masslike clumped enhancement in a segmental distribution from the 1 to 3:00 position of the left breast measuring 7.5 cm. Biopsy of the mass at the 1:00 position showed a grade 1 invasive lobular carcinoma and classical type LCIS. Estrogen receptor staining was strongly positive in >95% of tumor cell nuclei. Progesterone receptor staining was moderately positive in 60% of tumor cell nuclei. HER2 was nonamplified by FISH with a HER2 to SAVAGE-17 ratio of 1.2.     Left axillary sentinel lymph node biopsy and left nipple-sparing mastectomy with immediate reconstruction was performed on 12/21/15. Pathology showed invasive lobular carcinoma, Damari grade 2, involving the upper outer quadrant, central breast and lower inner quadrant, size 5.4 cm. There was LCIS, classical type. Surgical margins were negative. A single sentinel LN was negative. [id number]. Oncotype dx recurrence score was 11.  Adjuvant postmastectomy chest wall radiation was not recommended based on lack of high risk features.  She has been on adjuvant letrozole since 1/13/16.    Interim History:   Ms. Vegas comes into clinic today for routine breast cancer followup.  She continues on treatment with letrozole.  She tolerates the  medication remarkably well.  Hot flashes are intermittent and not bothersome, less frequent than they were previously.  She denies concerning lumps or masses of either breast.  She continues to be in good health.  She is exercising on a regular basis including walking, hiking and climbing the stairs in [place] on a regular basis.  She denies new bone or joint aches or pains.  She has some achiness in her shoulders which is chronic.  She also has chronic pain in her knees for which she wears a brace when she is exercising.  She has no cough, shortness of breath or chest pain.  No abdominal complaints.  No headaches, visual changes or focal neurologic complaints.  She recently went on a trip to [place] and [facility].  She has an upcoming trip this winter to [place] and then they will be in [place] for a month.  The remainder of a complete 12-point review of systems was reviewed with the patient and was negative with the exception of that mentioned above.     PAST MEDICAL HISTORY:   Past Medical History:   Diagnosis Date     BCC (basal cell carcinoma of skin)      Breast cancer (H) 2015    left, invasive lobular     GERD (gastroesophageal reflux disease)      Other malignant neoplasm of skin, site unspecified      Tinnitus      Tubular adenoma of colon      Unsatisfactory cervical cytology smear 12/04/2018 12/04/18: See problem list.      Variants of migraine, not elsewhere classified, without mention of intractable migraine without mention of status migrainosus     menstrual migraines       PAST SURGICAL HISTORY:  Past Surgical History:   Procedure Laterality Date     BIOPSY NODE SENTINEL Left 12/21/2015    Procedure: BIOPSY NODE SENTINEL;  Surgeon: Francisco Gomez MD;  Location: [facility] OR     COLONOSCOPY  2006     HEMORRHOIDECTOMY  5/2006     MASTECTOMY, RECONSTRUCT BREAST, COMBINED Left 12/21/2015    Procedure: COMBINED MASTECTOMY, RECONSTRUCT BREAST;  Surgeon: Francisco Gomez MD;  Location: [facility]     MASTOPEXY  "Right 2/15/2016    Procedure: MASTOPEXY;  Surgeon: RADHA Friedman MD;  Location: [facility]     RECONSTRUCT BREAST Left 12/21/2015    Procedure: RECONSTRUCT BREAST;  Surgeon: Francisco Gomez MD;  Location: [facility]     REMOVE AND REPLACE BREAST IMPLANT PROSTHESIS Left 2/15/2016    Procedure: REMOVE AND REPLACE BREAST IMPLANT PROSTHESIS;  Surgeon: RADHA Friedman MD;  Location: [facility]       MEDS:  Current Outpatient Medications   Medication     calcium-vitamin D 500-125 MG-UNIT TABS     cetirizine (ZYRTEC) 10 MG tablet     Ibuprofen (ADVIL PO)     letrozole (FEMARA) 2.5 MG tablet     VAGINAL LUBRICANT VA     No current facility-administered medications for this visit.        ALLERGIES:  Allergies   Allergen Reactions     Bactrim [Sulfamethoxazole W/Trimethoprim]      Facial redness     Codeine      Dizziness and nausea     Vicodin [Hydrocodone-Acetaminophen] Nausea        PHYSICAL EXAM:  BP (!) 163/99   Pulse 79   Temp 98.3  F (36.8  C) (Oral)   Resp 16   Ht 1.581 m (5' 2.24\")   Wt 68.6 kg (151 lb 3.2 oz)   LMP 01/25/2007   SpO2 98%   BMI 27.44 kg/m    LMP 01/25/2007   KPS: 100%  GENERAL: Well nourished, well appearing adult female in no acute distress.  A&Ox3.  HEENT: Normocephalic, atraumatic.  MMM.  EOMI.  No lesions of the oropharynx  LYMPH: No palpable cervical, supraclavicular, or axillary lymphadenopathy  CV: RRR, normal S1/S2, No murmurs, gallops, or rubs  BREAST:  Left breast mastectomy with reconstruction.  There are no discretely palpable masses in either the right breast or overlying the left implant.  Right nipple is everted.    LUNGS: CTA B/L, no wheezing or rhonchi  ABDOMEN: soft, nontender, non-distended. Bowel sounds normoactive.  EXTREMITIES: no pitting edema of the bilateral lower extremities.  Full ROM of the bilateral upper extremities.  NEURO: Cranial nerves II-XII grossly intact.  Gait stable.  PSYCH:  Mood and affect appear normal.  INTEGUMENT: No visible concerning skin rashes " "or lesions.    LABS REVIEWED THIS VISIT:  11/18/19 Right breast screening mammogram:  Reviewed images with patient in clinic today.  No suspicious findings on my view.  Final radiology read is pending.      IMPRESSION/PLAN: 65 yo female with a h/o T3N0M0, grade I, ER positive, NC positive, HER2 negative invasive lobular carcinoma of the left breast. She is s/p treatment with left breast mastectomy and has been on letrozole since 01/13/2016.    1.  Left breast estrogen receptor-positive cancer:  Ms. Vegas is just shy of 4 years out from excision of a left breast cancer.  She continues on letrozole.  She is tolerating it well.  We are planning on treating her for a total of 10 years (through 01/2026).  There is no evidence of disease recurrence on exam today.  Right breast screening mammogram is also without concerning findings.  I will see her back in clinic in 6 months.      2.  Bone health:  She is at increased risk of osteoporosis on letrozole.  DEXA in 03/2018 showed normal bone density with a lowest T-score of -0.4.  Continue to take calcium 1200 mg and vitamin D 1000 IU daily as well as walk a half hour daily.  Recently had a tooth pulled due to \"calcium loss\" and a cracked root.  Will repeat a DEXA at the time of her return visit.      3.  Followup:  Return to clinic in approximately 6 months for DEXA bone density scan and visit with me.         "

## 2019-11-18 ENCOUNTER — ANCILLARY PROCEDURE (OUTPATIENT)
Dept: MAMMOGRAPHY | Facility: CLINIC | Age: 64
End: 2019-11-18
Payer: MEDICARE

## 2019-11-18 ENCOUNTER — ONCOLOGY VISIT (OUTPATIENT)
Dept: ONCOLOGY | Facility: CLINIC | Age: 64
End: 2019-11-18
Attending: INTERNAL MEDICINE
Payer: MEDICARE

## 2019-11-18 VITALS
OXYGEN SATURATION: 98 % | WEIGHT: 151.2 LBS | SYSTOLIC BLOOD PRESSURE: 163 MMHG | HEART RATE: 79 BPM | RESPIRATION RATE: 16 BRPM | TEMPERATURE: 98.3 F | BODY MASS INDEX: 27.82 KG/M2 | DIASTOLIC BLOOD PRESSURE: 99 MMHG | HEIGHT: 62 IN

## 2019-11-18 DIAGNOSIS — Z12.31 VISIT FOR SCREENING MAMMOGRAM: ICD-10-CM

## 2019-11-18 DIAGNOSIS — M94.9 DISORDER OF BONE AND CARTILAGE: ICD-10-CM

## 2019-11-18 DIAGNOSIS — C50.812 MALIGNANT NEOPLASM OF OVERLAPPING SITES OF LEFT BREAST IN FEMALE, ESTROGEN RECEPTOR POSITIVE (H): Primary | ICD-10-CM

## 2019-11-18 DIAGNOSIS — Z17.0 MALIGNANT NEOPLASM OF OVERLAPPING SITES OF LEFT BREAST IN FEMALE, ESTROGEN RECEPTOR POSITIVE (H): Primary | ICD-10-CM

## 2019-11-18 DIAGNOSIS — M89.9 DISORDER OF BONE AND CARTILAGE: ICD-10-CM

## 2019-11-18 PROCEDURE — G0463 HOSPITAL OUTPT CLINIC VISIT: HCPCS | Mod: ZF

## 2019-11-18 PROCEDURE — 99213 OFFICE O/P EST LOW 20 MIN: CPT | Mod: ZP | Performed by: INTERNAL MEDICINE

## 2019-11-18 ASSESSMENT — MIFFLIN-ST. JEOR: SCORE: 1187.9

## 2019-11-18 ASSESSMENT — PAIN SCALES - GENERAL: PAINLEVEL: NO PAIN (0)

## 2019-11-18 NOTE — LETTER
11/18/2019       RE: Esther Vegas  [address] [facility] [address]     Dear Colleague,    Thank you for referring your patient, Esther Vegas, to the [facility]. Please see a copy of my visit note below.    MEDICAL ONCOLOGY FOLLOW-UP PATIENT VISIT     NAME: Esther Vegas     DATE: 11/18/2019    PRIMARY CARE PHYSICIAN: Yana Diaz    PATIENT ID: Grade I, stage IIb, T3N0M0, ER positive, CO positive, HER2 non-amplified invasive lobular carcinoma of the left breast. Oncotype DX recurrence score = 11.     Oncology History: Ms. Vegas is a 64 year old female with a h/o invasive lobular cancer of the left breast. Routine screening mammogram on 10/23/15 demonstrated a spiculated lesion in the upper outer left breast. Ultrasound showed a 0.6 cm mass with indistinct margins at the 1:30 position, 3 cm from the nipple and a second 0.6 cm mass at the 1:00 position, 5 cm from the nipple. Contrast enhanced mammogram then showed non-masslike clumped enhancement in a segmental distribution from the 1 to 3:00 position of the left breast measuring 7.5 cm. Biopsy of the mass at the 1:00 position showed a grade 1 invasive lobular carcinoma and classical type LCIS. Estrogen receptor staining was strongly positive in >95% of tumor cell nuclei. Progesterone receptor staining was moderately positive in 60% of tumor cell nuclei. HER2 was nonamplified by FISH with a HER2 to SAVAGE-17 ratio of 1.2.     Left axillary sentinel lymph node biopsy and left nipple-sparing mastectomy with immediate reconstruction was performed on 12/21/15. Pathology showed invasive lobular carcinoma, [place] grade 2, involving the upper outer quadrant, central breast and lower inner quadrant, size 5.4 cm. There was LCIS, classical type. Surgical margins were negative. A single sentinel LN was negative. [id number]. Oncotype dx recurrence score was 11.  Adjuvant postmastectomy chest wall radiation was not recommended based  on lack of high risk features.  She has been on adjuvant letrozole since 1/13/16.    Interim History:   Ms. Vegas comes into clinic today for routine breast cancer followup.  She continues on treatment with letrozole.  She tolerates the medication remarkably well.  Hot flashes are intermittent and not bothersome, less frequent than they were previously.  She denies concerning lumps or masses of either breast.  She continues to be in good health.  She is exercising on a regular basis including walking, hiking and climbing the stairs in [place] on a regular basis.  She denies new bone or joint aches or pains.  She has some achiness in her shoulders which is chronic.  She also has chronic pain in her knees for which she wears a brace when she is exercising.  She has no cough, shortness of breath or chest pain.  No abdominal complaints.  No headaches, visual changes or focal neurologic complaints.  She recently went on a trip to [place] and [facility].  She has an upcoming trip this winter to [place] and then they will be in [place] for a month.  The remainder of a complete 12-point review of systems was reviewed with the patient and was negative with the exception of that mentioned above.     PAST MEDICAL HISTORY:   Past Medical History:   Diagnosis Date     BCC (basal cell carcinoma of skin)      Breast cancer (H) 2015    left, invasive lobular     GERD (gastroesophageal reflux disease)      Other malignant neoplasm of skin, site unspecified      Tinnitus      Tubular adenoma of colon      Unsatisfactory cervical cytology smear 12/04/2018 12/04/18: See problem list.      Variants of migraine, not elsewhere classified, without mention of intractable migraine without mention of status migrainosus     menstrual migraines       PAST SURGICAL HISTORY:  Past Surgical History:   Procedure Laterality Date     BIOPSY NODE SENTINEL Left 12/21/2015    Procedure: BIOPSY NODE SENTINEL;  Surgeon: Francisco Gomez MD;   "Location: [facility] OR     COLONOSCOPY  2006     HEMORRHOIDECTOMY  5/2006     MASTECTOMY, RECONSTRUCT BREAST, COMBINED Left 12/21/2015    Procedure: COMBINED MASTECTOMY, RECONSTRUCT BREAST;  Surgeon: Francisco Gomez MD;  Location: [facility] OR     MASTOPEXY Right 2/15/2016    Procedure: MASTOPEXY;  Surgeon: RADHA Friedman MD;  Location: [facility]     RECONSTRUCT BREAST Left 12/21/2015    Procedure: RECONSTRUCT BREAST;  Surgeon: Francisco Gomez MD;  Location: [facility]     REMOVE AND REPLACE BREAST IMPLANT PROSTHESIS Left 2/15/2016    Procedure: REMOVE AND REPLACE BREAST IMPLANT PROSTHESIS;  Surgeon: RADHA Friedman MD;  Location: [facility]       MEDS:  Current Outpatient Medications   Medication     calcium-vitamin D 500-125 MG-UNIT TABS     cetirizine (ZYRTEC) 10 MG tablet     Ibuprofen (ADVIL PO)     letrozole (FEMARA) 2.5 MG tablet     VAGINAL LUBRICANT VA     No current facility-administered medications for this visit.        ALLERGIES:  Allergies   Allergen Reactions     Bactrim [Sulfamethoxazole W/Trimethoprim]      Facial redness     Codeine      Dizziness and nausea     Vicodin [Hydrocodone-Acetaminophen] Nausea        PHYSICAL EXAM:  BP (!) 163/99   Pulse 79   Temp 98.3  F (36.8  C) (Oral)   Resp 16   Ht 1.581 m (5' 2.24\")   Wt 68.6 kg (151 lb 3.2 oz)   LMP 01/25/2007   SpO2 98%   BMI 27.44 kg/m     LMP 01/25/2007   KPS: 100%  GENERAL: Well nourished, well appearing adult female in no acute distress.  A&Ox3.  HEENT: Normocephalic, atraumatic.  MMM.  EOMI.  No lesions of the oropharynx  LYMPH: No palpable cervical, supraclavicular, or axillary lymphadenopathy  CV: RRR, normal S1/S2, No murmurs, gallops, or rubs  BREAST:  Left breast mastectomy with reconstruction.  There are no discretely palpable masses in either the right breast or overlying the left implant.  Right nipple is everted.    LUNGS: CTA B/L, no wheezing or rhonchi  ABDOMEN: soft, nontender, non-distended. Bowel sounds normoactive.  EXTREMITIES: no " "pitting edema of the bilateral lower extremities.  Full ROM of the bilateral upper extremities.  NEURO: Cranial nerves II-XII grossly intact.  Gait stable.  PSYCH:  Mood and affect appear normal.  INTEGUMENT: No visible concerning skin rashes or lesions.    LABS REVIEWED THIS VISIT:  11/18/19 Right breast screening mammogram:  Reviewed images with patient in clinic today.  No suspicious findings on my view.  Final radiology read is pending.      IMPRESSION/PLAN: 63 yo female with a h/o T3N0M0, grade I, ER positive, MO positive, HER2 negative invasive lobular carcinoma of the left breast. She is s/p treatment with left breast mastectomy and has been on letrozole since 01/13/2016.    1.  Left breast estrogen receptor-positive cancer:  Ms. Vegas is just shy of 4 years out from excision of a left breast cancer.  She continues on letrozole.  She is tolerating it well.  We are planning on treating her for a total of 10 years (through 01/2026).  There is no evidence of disease recurrence on exam today.  Right breast screening mammogram is also without concerning findings.  I will see her back in clinic in 6 months.      2.  Bone health:  She is at increased risk of osteoporosis on letrozole.  DEXA in 03/2018 showed normal bone density with a lowest T-score of -0.4.  Continue to take calcium 1200 mg and vitamin D 1000 IU daily as well as walk a half hour daily.  Recently had a tooth pulled due to \"calcium loss\" and a cracked root.  Will repeat a DEXA at the time of her return visit.      3.  Followup:  Return to clinic in approximately 6 months for DEXA bone density scan and visit with me.     Again, thank you for allowing me to participate in the care of your patient.      Sincerely,    Genevieve Rojas MD      "

## 2020-01-31 ENCOUNTER — TRANSFERRED RECORDS (OUTPATIENT)
Dept: HEALTH INFORMATION MANAGEMENT | Facility: CLINIC | Age: 65
End: 2020-01-31

## 2020-03-03 NOTE — TELEPHONE ENCOUNTER
FUTURE VISIT INFORMATION      SURGERY INFORMATION:    DOS 2020, Dr El Aguila at [facility], self referred [phone]     Consult: requested surgery consult. Surgery info    RECORDS REQUESTED FROM:       Primary Care Provider: Yana Diaz MD- [place]    Most recent EKG+ Tracin/16/15

## 2020-03-04 ENCOUNTER — OFFICE VISIT (OUTPATIENT)
Dept: SURGERY | Facility: CLINIC | Age: 65
End: 2020-03-04
Payer: COMMERCIAL

## 2020-03-04 ENCOUNTER — PRE VISIT (OUTPATIENT)
Dept: SURGERY | Facility: CLINIC | Age: 65
End: 2020-03-04

## 2020-03-04 ENCOUNTER — ANESTHESIA EVENT (OUTPATIENT)
Dept: SURGERY | Facility: CLINIC | Age: 65
End: 2020-03-04

## 2020-03-04 VITALS
DIASTOLIC BLOOD PRESSURE: 104 MMHG | OXYGEN SATURATION: 98 % | HEIGHT: 62 IN | WEIGHT: 153 LBS | HEART RATE: 71 BPM | SYSTOLIC BLOOD PRESSURE: 186 MMHG | RESPIRATION RATE: 16 BRPM | BODY MASS INDEX: 28.16 KG/M2 | TEMPERATURE: 98 F

## 2020-03-04 DIAGNOSIS — Z01.818 PREOP EXAMINATION: Primary | ICD-10-CM

## 2020-03-04 RX ORDER — PREDNISOLONE ACETATE 10 MG/ML
1 SUSPENSION/ DROPS OPHTHALMIC 4 TIMES DAILY
COMMUNITY
Start: 2020-03-03 | End: 2020-05-18

## 2020-03-04 ASSESSMENT — MIFFLIN-ST. JEOR: SCORE: 1192.25

## 2020-03-04 ASSESSMENT — PAIN SCALES - GENERAL: PAINLEVEL: NO PAIN (0)

## 2020-03-04 NOTE — H&P
Pre-Operative H & P     CC:  Preoperative exam to assess for increased cardiopulmonary risk while undergoing surgery and anesthesia.    Date of Encounter: 3/4/2020  Primary Care Physician:  Yana Diaz  Reason for visit: Right eye cataract    HPI  Esther Vegas is a 64 year old female who presents for pre-operative H & P in preparation for right eye cataract surgery with Dr. El Aguila on 3/11/20 at [facility]. History is obtained from the patient and medical records.     Patient who is followed by Dr. Aguila for right side visually significant cataract. She has been counseled for above procedure. Her only other history involving her right eye is a minor retinal bleed approximately 20 years ago which was evaluated, however, she denies any procedures.     Her history is otherwise significant for T3N0M0, grade I, ER positive, IL positive, HER2 negative invasive lobular carcinoma of the left breast. She is s/p treatment with left breast mastectomy and has been on letrozole since 01/13/2016. She has never had a problem with anesthesia.     Patient presented today with high blood pressure 172/108. She reports that she does not typically have this issue and does have some degree of white coat hypertension. She checks her BP at home every other day and her range is always 130/60-70. She has just returned from a vacation from [place] where she did not follow her typical diet and thinks this is also a factor.     Her PCP is Yana Diaz MD.    Past Medical History  Past Medical History:   Diagnosis Date     BCC (basal cell carcinoma of skin)      Breast cancer (H) 2015    left, invasive lobular     GERD (gastroesophageal reflux disease)      Other malignant neoplasm of skin, site unspecified      Tinnitus      Tubular adenoma of colon      Unsatisfactory cervical cytology smear 12/04/2018 12/04/18: See problem list.      Variants of migraine, not elsewhere classified, without mention of  intractable migraine without mention of status migrainosus     menstrual migraines       Past Surgical History  Past Surgical History:   Procedure Laterality Date     BIOPSY NODE SENTINEL Left 12/21/2015    Procedure: BIOPSY NODE SENTINEL;  Surgeon: Francisco Gomez MD;  Location: [facility]     COLONOSCOPY  2006     HEMORRHOIDECTOMY  5/2006     MASTECTOMY, RECONSTRUCT BREAST, COMBINED Left 12/21/2015    Procedure: COMBINED MASTECTOMY, RECONSTRUCT BREAST;  Surgeon: Francisco Gomez MD;  Location: [facility]     MASTOPEXY Right 2/15/2016    Procedure: MASTOPEXY;  Surgeon: RADHA Friedman MD;  Location: [facility]     RECONSTRUCT BREAST Left 12/21/2015    Procedure: RECONSTRUCT BREAST;  Surgeon: Francisco Gomez MD;  Location: [facility]     REMOVE AND REPLACE BREAST IMPLANT PROSTHESIS Left 2/15/2016    Procedure: REMOVE AND REPLACE BREAST IMPLANT PROSTHESIS;  Surgeon: RADHA Friedman MD;  Location: [facility] OR       Hx of Blood transfusions/reactions: Denies.      Hx of abnormal bleeding or anti-platelet use: Denies.     Menstrual history: Patient's last menstrual period was 01/25/2007.    Steroid use in the last year: Denies.     Personal or FH with difficulty with Anesthesia:  Denies.     Prior to Admission Medications  Current Outpatient Medications   Medication Sig Dispense Refill     calcium-vitamin D 500-125 MG-UNIT TABS Take 1 tablet by mouth every evening        cetirizine (ZYRTEC) 10 MG tablet Take 10 mg by mouth every evening        letrozole (FEMARA) 2.5 MG tablet Take 1 tablet (2.5 mg) by mouth daily (Patient taking differently: Take 2.5 mg by mouth every evening ) 90 tablet 3     prednisoLONE acetate (PRED FORTE) 1 % ophthalmic suspension Place 1 drop into the right eye 4 times daily          Allergies  Allergies   Allergen Reactions     Bactrim [Sulfamethoxazole W/Trimethoprim]      Facial redness     Codeine      Dizziness and nausea     Vicodin [Hydrocodone-Acetaminophen] Nausea       Social History  Social History      Socioeconomic History     Marital status:      Spouse name: Yoan     Number of children: 4     Years of education: Not on file     Highest education level: Not on file   Occupational History     Occupation: RN   Social Needs     Financial resource strain: Not on file     Food insecurity:     Worry: Not on file     Inability: Not on file     Transportation needs:     Medical: Not on file     Non-medical: Not on file   Tobacco Use     Smoking status: Never Smoker     Smokeless tobacco: Never Used   Substance and Sexual Activity     Alcohol use: Yes     Alcohol/week: 2.0 - 3.0 standard drinks     Types: 2 - 3 Standard drinks or equivalent per week     Comment: 5 X week     Drug use: No     Sexual activity: Yes     Partners: Male   Lifestyle     Physical activity:     Days per week: Not on file     Minutes per session: Not on file     Stress: Not on file   Relationships     Social connections:     Talks on phone: Not on file     Gets together: Not on file     Attends Anglican service: Not on file     Active member of club or organization: Not on file     Attends meetings of clubs or organizations: Not on file     Relationship status: Not on file     Intimate partner violence:     Fear of current or ex partner: Not on file     Emotionally abused: Not on file     Physically abused: Not on file     Forced sexual activity: Not on file   Other Topics Concern     Parent/sibling w/ CABG, MI or angioplasty before 65F 55M? Not Asked   Social History Narrative    Caffeine intake/servings daily - 2    Calcium intake/servings daily - 3-4    Exercise 3-4 times weekly - describe walking    Sunscreen used - Yes    Seatbelts used - Yes    Guns stored in the home - Yes    Self Breast Exam - Yes    Pap test up to date -  Yes    Eye exam up to date -  Yes    Dental exam up to date -  Yes    DEXA scan up to date -  No    Flex Sig/Colonoscopy up to date -  Yes and 2005/2006?    Mammography up to date -  Yes 9/11 neg     "Immunizations reviewed and up to date - No    Abuse: Current or Past (Physical, Sexual or Emotional) - No    Do you feel safe in your environment - Yes    Do you cope well with stress - Yes    Do you suffer from insomnia - No    Last updated by: Joanne Gilligan RN 11/23/11                               Family History  Family History   Problem Relation Age of Onset     C.A.D. Mother      Hypertension Mother      Cerebrovascular Disease Mother      Lipids Mother         high cholesterol     Circulatory Father      Cancer Father         stomach, skin     Breast Cancer Sister         Age 54     Hypertension Brother      Hypertension Sister      Heart Disease Sister         a fib     Breast Cancer Cousin         paternal     Colon Cancer Other         Pateral uncle       Review of Systems    The complete review of systems is negative other than noted in the HPI or here.   Constitutional: Denies fever, chills, weight loss.  Skin: No concerns.   HEENT: Wears glasses for vision. No significant dental work.   Respiratory: Denies cough or shortness of breath. No concern for DIONE.  CV: Denies chest pain or irregular HR. Good activity tolerance at least 10,000 steps daily. Recent trip to [place].  Lymph: No concerns for left arm lymphedema after breast cancer treatment.   M/S. Denies pain.   Neuro: No seizure or stroke history.     Temp: 98  F (36.7  C) Temp src: Oral BP: (!) 186/104 Pulse: 71   Resp: 16 SpO2: 98 %         153 lbs 0 oz  5' 2\"   Body mass index is 27.98 kg/m . Initial /108       Physical Exam  Constitutional: Awake, alert, cooperative, no apparent distress, and appears stated age.  Eyes: Pupils equal, round and reactive to light, extra ocular muscles intact, sclera clear, conjunctiva normal. Glasses on.  HENT: Normocephalic, oral pharynx with moist mucus membranes, good dentition. No goiter appreciated.   Respiratory: Clear to auscultation bilaterally, no crackles or wheezing. No cough or obvious " dyspnea.  Cardiovascular: Regular rate and rhythm, normal S1 and S2, and no murmur noted.  Carotids +2, no bruits. No edema. Palpable pulses to radial  DP and PT arteries.   GI: Normal bowel sounds, soft, non-distended, non-tender, no masses palpated, no hepatosplenomegaly.  Surgical scars:   Lymph/Hematologic: No cervical lymphadenopathy and no supraclavicular lymphadenopathy.  Genitourinary:  Deferred.   Skin: Warm and dry.    Musculoskeletal: Full ROM of neck. There is no redness, warmth, or swelling of the joints. Gross motor strength is normal.    Neurologic: Awake, alert, oriented to name, place and time. Cranial nerves II-XII are grossly intact. Gait is normal.   Neuropsychiatric: Calm, cooperative. Normal affect.     Labs: Not indicated.   EKG: Personally reviewed 2015 Normal sinus rhythm    Outside records reviewed from: Care Everywhere    ASSESSMENT and PLAN  Esther Vegas is a 64 year old female scheduled to undergo right eye cataract surgery on 3/11/20. She has the following specific operative considerations:   - RCRI : No serious cardiac risks.    - Anesthesia considerations:  Refer to PAC assessment in anesthesia records  - VTE risk: 0.5%  - DIONE # of risks 1/8 = Low risk  - Risk of PONV score = 3.  If > 2, anti-emetic intervention recommended. If 3 or > anti emetic intervention recommended with two or more meds    --Right eye visually significant cataract with above procedure now planned with topical and MAC. Patient is comfortable with plan.   --Generally healthy female with no significant cardiopulmonary history. Nonsmoker. Good activity tolerance. BP high today but average 130/60-70 with every other day home monitoring. Patient agreed that she will continue to self monitor and if her BP does not normalize at home she will contact her PCP to be seen quickly prior to surgery.   --DIONE risk low. Able to lay flat without difficulty.   --History of left breast cancer s/p mastectomy continues on Femara  at .     Confirmed with patient that she will receive instructions from [facility] regarding arrival time, NPO instructions and additional prep.     Patient is optimized and is acceptable candidate for the proposed procedure. BP monitoring will continue as above. No further diagnostic evaluation is needed. A copy of this note will be faxed today to [facility].    MARINO Ny CNS  Preoperative Assessment Center  [facility]  Clinic and [facility] Center  Phone: [phone]  Fax: [phone]

## 2020-03-10 ENCOUNTER — TRANSFERRED RECORDS (OUTPATIENT)
Dept: HEALTH INFORMATION MANAGEMENT | Facility: CLINIC | Age: 65
End: 2020-03-10

## 2020-03-15 ENCOUNTER — HEALTH MAINTENANCE LETTER (OUTPATIENT)
Age: 65
End: 2020-03-15

## 2020-05-16 NOTE — PROGRESS NOTES
"Esther Vegas is a 64 year old female who is being evaluated via a billable video visit.      The patient has been notified of following:     \"This video visit will be conducted via a call between you and your physician/provider. We have found that certain health care needs can be provided without the need for an in-person physical exam.  This service lets us provide the care you need with a video conversation.  If a prescription is necessary we can send it directly to your pharmacy.  If lab work is needed we can place an order for that and you can then stop by our lab to have the test done at a later time.    Video visits are billed at different rates depending on your insurance coverage.  Please reach out to your insurance provider with any questions.    If during the course of the call the physician/provider feels a video visit is not appropriate, you will not be charged for this service.\"    Patient has given verbal consent for Video visit? Yes    How would you like to obtain your AVS? MyChart    Patient would like the video invitation sent by: Send to e-mail at: [email or web address]    Will anyone else be joining your video visit? No          Secondary Video Option (Doxim[employer]), send text message to:  [phone]    I have reviewed and updated the patient's allergies and medication list.    Concerns: Patient has no new concerns.    Refills: None      RAMONA Yepez              Video-Visit Details    Type of service:  Video Visit    Video Start Time: 9:45  Video End Time: 10:17    Originating Location (pt. Location): Home    Distant Location (provider location):  [facility]     Platform used for Video Visit: [employer]                [facility] ONCOLOGY FOLLOW-UP PATIENT VISIT     NAME: Esther Vegas     DATE: 5/18/2020    PRIMARY CARE PHYSICIAN: Yana Diaz    PATIENT ID: Grade I, stage IIb, T3N0M0, ER positive, LA positive, HER2 non-amplified invasive lobular carcinoma of the left " breast. Oncotype DX recurrence score = 11.     Oncology History: Ms. Vegas is a 64 year old female with a h/o invasive lobular cancer of the left breast. Routine screening mammogram on 10/23/15 demonstrated a spiculated lesion in the upper outer left breast. Ultrasound showed a 0.6 cm mass with indistinct margins at the 1:30 position, 3 cm from the nipple and a second 0.6 cm mass at the 1:00 position, 5 cm from the nipple. Contrast enhanced mammogram then showed non-masslike clumped enhancement in a segmental distribution from the 1 to 3:00 position of the left breast measuring 7.5 cm. Biopsy of the mass at the 1:00 position showed a grade 1 invasive lobular carcinoma and classical type LCIS. Estrogen receptor staining was strongly positive in >95% of tumor cell nuclei. Progesterone receptor staining was moderately positive in 60% of tumor cell nuclei. HER2 was nonamplified by FISH with a HER2 to SAVAGE-17 ratio of 1.2.     Left axillary sentinel lymph node biopsy and left nipple-sparing mastectomy with immediate reconstruction was performed on 12/21/15. Pathology showed invasive lobular carcinoma, Damari grade 2, involving the upper outer quadrant, central breast and lower inner quadrant, size 5.4 cm. There was LCIS, classical type. Surgical margins were negative. A single sentinel LN was negative. [id number]. Oncotype dx recurrence score was 11.  Adjuvant postmastectomy chest wall radiation was not recommended based on lack of high risk features.  She has been on adjuvant letrozole since 1/13/16.    Interim History:   Esther has been well since her last visit. On March 1 she thinks she had a kidney stone while she was in [place] (abdominal pain, nausea, vomiting, hematuria, small lump passed), the episode lasted a couple of hours. On March 11 she had an uncomplicated right eye cataract surgery, which was followed with prednisolone drops (this is done). She has been walking 2 hours, 5 days a week. Tolerating  letrozole well. No other changes to medical or family history. No fevers, chills, night sweats, nausea, vomiting, constipation, diarrhea, dizziness, petechiae, ecchymosis, or bleeding. No breast concerns. The remainder of a complete 12 point review of systems was reviewed with the patient and was negative with the exception of that mentioned above.    PAST MEDICAL HISTORY:   Past Medical History:   Diagnosis Date     BCC (basal cell carcinoma of skin)      Breast cancer (H) 2015    left, invasive lobular     GERD (gastroesophageal reflux disease)      Other malignant neoplasm of skin, site unspecified      Tinnitus      Tubular adenoma of colon      Unsatisfactory cervical cytology smear 12/04/2018 12/04/18: See problem list.      Variants of migraine, not elsewhere classified, without mention of intractable migraine without mention of status migrainosus     menstrual migraines       PAST SURGICAL HISTORY:  Past Surgical History:   Procedure Laterality Date     BIOPSY NODE SENTINEL Left 12/21/2015    Procedure: BIOPSY NODE SENTINEL;  Surgeon: Francisco Gomez MD;  Location: [facility] OR     COLONOSCOPY  2006     HEMORRHOIDECTOMY  5/2006     MASTECTOMY, RECONSTRUCT BREAST, COMBINED Left 12/21/2015    Procedure: COMBINED MASTECTOMY, RECONSTRUCT BREAST;  Surgeon: Francisco Gomez MD;  Location: [facility] OR     MASTOPEXY Right 2/15/2016    Procedure: MASTOPEXY;  Surgeon: RADHA Friedman MD;  Location: [facility] OR     RECONSTRUCT BREAST Left 12/21/2015    Procedure: RECONSTRUCT BREAST;  Surgeon: Francisco Gomez MD;  Location: [facility] OR     REMOVE AND REPLACE BREAST IMPLANT PROSTHESIS Left 2/15/2016    Procedure: REMOVE AND REPLACE BREAST IMPLANT PROSTHESIS;  Surgeon: RADHA Friedman MD;  Location: [facility] OR       MEDS:  Current Outpatient Medications   Medication     calcium-vitamin D 500-125 MG-UNIT TABS     cetirizine (ZYRTEC) 10 MG tablet     letrozole (FEMARA) 2.5 MG tablet     prednisoLONE acetate (PRED FORTE) 1 % ophthalmic  suspension     No current facility-administered medications for this visit.        ALLERGIES:  Allergies   Allergen Reactions     Bactrim [Sulfamethoxazole W/Trimethoprim]      Facial redness     Codeine      Dizziness and nausea     Vicodin [Hydrocodone-Acetaminophen] Nausea        PHYSICAL EXAM:  General:  Well appearing, well nourished adult female in NAD  Eyes:  No erythema or discharge  Respiratory:  Breathing comfortably on room air.  No wheezing or distress.  Musculoskeletal:  Full ROM of the bilateral upper extremities.  Skin:  No visible concerning skin rashes or lesions  Neuro:  No notable tremor and dyskinetic movements.  Psych:  Mood and affect appear normal.    The rest of a comprehensive physical examination is deferred due to PHE (public health emergency) video visit restrictions      LABS REVIEWED THIS VISIT:  5/18/2020 DEXA bone density scan: -0.8    IMPRESSION/PLAN: 63 yo female with a h/o T3N0M0, grade I, ER positive, IN positive, HER2 negative invasive lobular carcinoma of the left breast. She is s/p treatment with left breast mastectomy and has been on letrozole since 01/13/2016.    1.  Left breast estrogen receptor-positive cancer:  Ms. Vegas is 4 years, 5 months out from excision of a left breast cancer.  She continues on letrozole.  According to the EBCTCG meta-analysis published in 2017, if she were to stop endocrine therapy at 5 years, risk of distant breast cancer recurrence in years 5-20 is approximately 20%.  We are therefore planning on treating with a total of 10 years (through 01/2026) of endocrine therapy.  She is asymptomatic of disease recurrence on history today.  I will see her back in clinic in 6 months.  She will be due for right breast screening mammogram at that time.  In the meantime, she will perform monthly self breast examination and contact our clinic if any concerns.    2.  Bone health:  She is at increased risk of osteoporosis on letrozole.  DEXA today with a lowest  T-score of -0.8, this is slightly decreased from two years prior.  I recommend she continue to take calcium 1200 mg and vitamin D 1000 IU daily as well as walk a half hour daily.   Will plan to repeat a DEXA in approximately 2 years.    3.  Followup:  Return to clinic 11/18/2020 or later for right breast screening mammogram and visit with me.    Staffed with Dr. Crystal Dc MD  Medicine/Dermatology PGY-5  p [phone]    This video visit was conducted with the patient, Dr. Dc, and myself present.  I have updated the above note to reflect our joint assessment and plan.  I personally spent 25 minutes in video visit with this patient today.    Genevieve Rojas MD

## 2020-05-18 ENCOUNTER — VIRTUAL VISIT (OUTPATIENT)
Dept: ONCOLOGY | Facility: CLINIC | Age: 65
End: 2020-05-18
Attending: INTERNAL MEDICINE
Payer: COMMERCIAL

## 2020-05-18 ENCOUNTER — ANCILLARY PROCEDURE (OUTPATIENT)
Dept: BONE DENSITY | Facility: CLINIC | Age: 65
End: 2020-05-18
Attending: INTERNAL MEDICINE
Payer: COMMERCIAL

## 2020-05-18 DIAGNOSIS — C50.812 MALIGNANT NEOPLASM OF OVERLAPPING SITES OF LEFT BREAST IN FEMALE, ESTROGEN RECEPTOR POSITIVE (H): Primary | ICD-10-CM

## 2020-05-18 DIAGNOSIS — Z17.0 MALIGNANT NEOPLASM OF OVERLAPPING SITES OF LEFT BREAST IN FEMALE, ESTROGEN RECEPTOR POSITIVE (H): Primary | ICD-10-CM

## 2020-05-18 DIAGNOSIS — M94.9 DISORDER OF BONE AND CARTILAGE: ICD-10-CM

## 2020-05-18 DIAGNOSIS — M89.9 DISORDER OF BONE AND CARTILAGE: ICD-10-CM

## 2020-05-18 DIAGNOSIS — Z12.31 VISIT FOR SCREENING MAMMOGRAM: ICD-10-CM

## 2020-05-18 PROCEDURE — 99214 OFFICE O/P EST MOD 30 MIN: CPT | Mod: 95 | Performed by: INTERNAL MEDICINE

## 2020-05-18 PROCEDURE — 40000114 ZZH STATISTIC NO CHARGE CLINIC VISIT

## 2020-07-23 DIAGNOSIS — Z17.0 MALIGNANT NEOPLASM OF OVERLAPPING SITES OF LEFT BREAST IN FEMALE, ESTROGEN RECEPTOR POSITIVE (H): ICD-10-CM

## 2020-07-23 DIAGNOSIS — C50.812 MALIGNANT NEOPLASM OF OVERLAPPING SITES OF LEFT BREAST IN FEMALE, ESTROGEN RECEPTOR POSITIVE (H): ICD-10-CM

## 2020-07-23 RX ORDER — LETROZOLE 2.5 MG/1
2.5 TABLET, FILM COATED ORAL DAILY
Qty: 90 TABLET | Refills: 3 | Status: SHIPPED | OUTPATIENT
Start: 2020-07-23 | End: 2021-07-20

## 2020-07-23 NOTE — TELEPHONE ENCOUNTER
"Per 5/18 visit notes \"She continues on letrozole.  According to the EBCTCG meta-analysis published in 2017, if she were to stop endocrine therapy at 5 years, risk of distant breast cancer recurrence in years 5-20 is approximately 20%.  We are therefore planning on treating with a total of 10 years (through 01/2026) of endocrine therapy.\"  Refilling.  "

## 2020-11-22 NOTE — PROGRESS NOTES
"Esther Vegas is a 65 year old female who is being evaluated via a billable video visit.      The patient has been notified of following:     \"This video visit will be conducted via a call between you and your physician/provider. We have found that certain health care needs can be provided without the need for an in-person physical exam.  This service lets us provide the care you need with a video conversation.  If a prescription is necessary we can send it directly to your pharmacy.  If lab work is needed we can place an order for that and you can then stop by our lab to have the test done at a later time.    Video visits are billed at different rates depending on your insurance coverage.  Please reach out to your insurance provider with any questions.    If during the course of the call the physician/provider feels a video visit is not appropriate, you will not be charged for this service.\"    Patient has given verbal consent for Video visit? Yes  How would you like to obtain your AVS? MyChart  If you are dropped from the video visit, the video invite should be resent to: Send to e-mail at: [email or web address]  Will anyone else be joining your video visit? No    I have reviewed and updated the patient's allergies and medication list.    Concerns: no new concerns  Refills: none needed    Vitals - Patient Reported  Weight (Patient Reported): 68.5 kg (151 lb)  Height (Patient Reported): 158.8 cm (5' 2.5\")  BMI (Based on Pt Reported Ht/Wt): 27.18  Pain Score: No Pain (0)       Raisa Kent CMA        Video-Visit Details    Type of service:  Video Visit    Total time of video visit:  20 minutes    Originating Location (pt. Location): Home    Distant Location (provider location):  [facility]     Platform used for Video Visit: Yon Rojas MD            ONCOLOGY VISIT:    NAME: Esther Vegas     DATE: 11/23/2020    PRIMARY CARE PHYSICIAN: Yana Diaz " L    PATIENT ID: Grade I, stage IIb, T3N0M0, ER positive, NH positive, HER2 non-amplified invasive lobular carcinoma of the left breast. Oncotype DX recurrence score = 11.     Oncology History: Ms. Vegas is a 65 year old female with a h/o invasive lobular cancer of the left breast. Routine screening mammogram on 10/23/15 demonstrated a spiculated lesion in the upper outer left breast. Ultrasound showed a 0.6 cm mass with indistinct margins at the 1:30 position, 3 cm from the nipple and a second 0.6 cm mass at the 1:00 position, 5 cm from the nipple. Contrast enhanced mammogram then showed non-masslike clumped enhancement in a segmental distribution from the 1 to 3:00 position of the left breast measuring 7.5 cm. Biopsy of the mass at the 1:00 position showed a grade 1 invasive lobular carcinoma and classical type LCIS. Estrogen receptor staining was strongly positive in >95% of tumor cell nuclei. Progesterone receptor staining was moderately positive in 60% of tumor cell nuclei. HER2 was nonamplified by FISH with a HER2 to SAVAGE-17 ratio of 1.2.     Left axillary sentinel lymph node biopsy and left nipple-sparing mastectomy with immediate reconstruction was performed on 12/21/15. Pathology showed invasive lobular carcinoma, Damari grade 2, involving the upper outer quadrant, central breast and lower inner quadrant, size 5.4 cm. There was LCIS, classical type. Surgical margins were negative. A single sentinel LN was negative. [id number]. Oncotype dx recurrence score was 11.  Adjuvant postmastectomy chest wall radiation was not recommended based on lack of high risk features.  She has been on adjuvant letrozole since 1/13/16.    Interim History:   Ms. Vegas was contacted today via video visit due to the ongoing pandemic.  She continues on treatment with letrozole.  She overall is tolerating treatment well.  She admits that she has been a bit down during the pandemic.  She denies symptoms of clinical depression.   She has been continuing to exercise on a regular basis.  She is walking/jogging approximately 5 miles per day.  She denies current bone or joint aches or pains.  She has no current hot flashes or vaginal symptoms.  She has been in good health and specifically has had no fevers, chills, cough, or shortness of breath.  She states early in the pandemic she had exacerbation of acid reflux, so she restarted taking Pepcid.  Taking Pepcid helped her symptoms.  She does not want to be on the medication long-term, and therefore only takes it for a couple of weeks at a time.  She tries to avoid taking ranitidine as she is aware of the cancer risk associated with it.  If she has acid reflux despite Pepcid she will take Tums.  The remainder of a complete 12 point review of systems was reviewed with the patient was negative the exception of that mentioned above.    PAST MEDICAL HISTORY:   Past Medical History:   Diagnosis Date     BCC (basal cell carcinoma of skin)      Breast cancer (H) 2015    left, invasive lobular     GERD (gastroesophageal reflux disease)      Other malignant neoplasm of skin, site unspecified      Tinnitus      Tubular adenoma of colon      Unsatisfactory cervical cytology smear 12/04/2018 12/04/18: See problem list.      Variants of migraine, not elsewhere classified, without mention of intractable migraine without mention of status migrainosus     menstrual migraines       PAST SURGICAL HISTORY:  Past Surgical History:   Procedure Laterality Date     BIOPSY NODE SENTINEL Left 12/21/2015    Procedure: BIOPSY NODE SENTINEL;  Surgeon: Francisco Gomez MD;  Location: [facility]     COLONOSCOPY  2006     HEMORRHOIDECTOMY  5/2006     MASTECTOMY, RECONSTRUCT BREAST, COMBINED Left 12/21/2015    Procedure: COMBINED MASTECTOMY, RECONSTRUCT BREAST;  Surgeon: Francisco Gomez MD;  Location: [facility]     MASTOPEXY Right 2/15/2016    Procedure: MASTOPEXY;  Surgeon: RADHA Friedman MD;  Location: [facility]     RECONSTRUCT  BREAST Left 12/21/2015    Procedure: RECONSTRUCT BREAST;  Surgeon: Francisco Gomez MD;  Location: [facility]     REMOVE AND REPLACE BREAST IMPLANT PROSTHESIS Left 2/15/2016    Procedure: REMOVE AND REPLACE BREAST IMPLANT PROSTHESIS;  Surgeon: RADHA Friedman MD;  Location: [facility] OR       MEDS:  Current Outpatient Medications   Medication     calcium-vitamin D 500-125 MG-UNIT TABS     cetirizine (ZYRTEC) 10 MG tablet     letrozole (FEMARA) 2.5 MG tablet     No current facility-administered medications for this visit.        ALLERGIES:  Allergies   Allergen Reactions     Bactrim [Sulfamethoxazole W/Trimethoprim]      Facial redness     Codeine      Dizziness and nausea     Vicodin [Hydrocodone-Acetaminophen] Nausea        PHYSICAL EXAM:  General:  Well appearing adult female in NAD.  Eyes:  No erythema or discharge  Respiratory:  Breathing comfortably on room air.  No wheezing or distress.  Musculoskeletal:  Full ROM of the bilateral upper extremities.  Skin:  No visible concerning skin rashes or lesions  Neuro:  No notable tremor and dyskinetic movements.  Psych:  Mood and affect appear normal.    The rest of a comprehensive physical examination is deferred due to PHE (public health emergency) video visit restrictions.      LABS REVIEWED THIS VISIT:  11/23/2020 Right breast screening mammogram:  No significant changes on my view, radiology read is pending.    IMPRESSION/PLAN: 66 yo female with a h/o T3N0M0, grade I, ER positive, NE positive, HER2 negative invasive lobular carcinoma of the left breast. She is s/p treatment with left breast mastectomy and has been on letrozole since 01/13/2016.    1.  Left breast estrogen receptor-positive cancer:  Ms. Vegas is 4 years, 11 months out from excision of a left breast cancer.  She continues on letrozole.  She is overall tolerating the medication well.  We are planning on treating with a total 10 years (through 01/2026) of endocrine therapy.      She is asymptomatic of  disease recurrence on history taken today.  Right breast screening mammogram performed today was reviewed and is without change when compared to prior.  Of note, the radiology read was still pending at the time of our visit.  Will schedule next visit in 6 months.      2.  Bone health:  She is at increased risk of osteoporosis on letrozole. We also discussed increased risk of osteoporosis associated with PPIs.  She will continue to use them on an intermittent basis only.  DEXA in 05/2020 with a lowest T-score of -0.8.  Continue calcium 1200 mg and vitamin D 1000 IU daily as well as walk a half hour daily.      3.  GERD:  Continue to take Pepcid and Tums prn.  We discussed if persistent symptoms despite these, would recommend referral to Gastroenterology for consideration of endoscopy and treatments other than PPI for acid reflux.    4.  Stress/Coping:  She is dealing well with the increased stress caused by the pandemic.  She continues to exercise on a regular basis.  She has a good support system as well.    5.  Followup:  Return visit with me in approximately 6 months.    I spent a total of 20 minutes in video visit with this patient today.

## 2020-11-23 ENCOUNTER — VIRTUAL VISIT (OUTPATIENT)
Dept: ONCOLOGY | Facility: CLINIC | Age: 65
End: 2020-11-23
Attending: INTERNAL MEDICINE
Payer: COMMERCIAL

## 2020-11-23 ENCOUNTER — ANCILLARY PROCEDURE (OUTPATIENT)
Dept: MAMMOGRAPHY | Facility: CLINIC | Age: 65
End: 2020-11-23
Attending: INTERNAL MEDICINE
Payer: COMMERCIAL

## 2020-11-23 DIAGNOSIS — Z17.0 MALIGNANT NEOPLASM OF OVERLAPPING SITES OF LEFT BREAST IN FEMALE, ESTROGEN RECEPTOR POSITIVE (H): Primary | ICD-10-CM

## 2020-11-23 DIAGNOSIS — C50.812 MALIGNANT NEOPLASM OF OVERLAPPING SITES OF LEFT BREAST IN FEMALE, ESTROGEN RECEPTOR POSITIVE (H): Primary | ICD-10-CM

## 2020-11-23 DIAGNOSIS — Z12.31 VISIT FOR SCREENING MAMMOGRAM: ICD-10-CM

## 2020-11-23 PROCEDURE — 77063 BREAST TOMOSYNTHESIS BI: CPT | Mod: GC

## 2020-11-23 PROCEDURE — 77067 SCR MAMMO BI INCL CAD: CPT | Mod: GC

## 2020-11-23 PROCEDURE — 99214 OFFICE O/P EST MOD 30 MIN: CPT | Mod: 95 | Performed by: INTERNAL MEDICINE

## 2020-11-23 NOTE — LETTER
"    11/23/2020         RE: Esther Vegas  [address]  [facility] [address]        Dear Colleague,    Thank you for referring your patient, Esther Vegas, to the [facility]. Please see a copy of my visit note below.    Esther Vegas is a 65 year old female who is being evaluated via a billable video visit.      The patient has been notified of following:     \"This video visit will be conducted via a call between you and your physician/provider. We have found that certain health care needs can be provided without the need for an in-person physical exam.  This service lets us provide the care you need with a video conversation.  If a prescription is necessary we can send it directly to your pharmacy.  If lab work is needed we can place an order for that and you can then stop by our lab to have the test done at a later time.    Video visits are billed at different rates depending on your insurance coverage.  Please reach out to your insurance provider with any questions.    If during the course of the call the physician/provider feels a video visit is not appropriate, you will not be charged for this service.\"    Patient has given verbal consent for Video visit? Yes  How would you like to obtain your AVS? MyChart  If you are dropped from the video visit, the video invite should be resent to: Send to e-mail at: [email or web address]  Will anyone else be joining your video visit? No    I have reviewed and updated the patient's allergies and medication list.    Concerns: no new concerns  Refills: none needed    Vitals - Patient Reported  Weight (Patient Reported): 68.5 kg (151 lb)  Height (Patient Reported): 158.8 cm (5' 2.5\")  BMI (Based on Pt Reported Ht/Wt): 27.18  Pain Score: No Pain (0)       Raisa Kent CMA        Video-Visit Details    Type of service:  Video Visit    Total time of video visit:  20 minutes    Originating Location (pt. Location): Home    Distant " Location (provider location):  [facility]     Platform used for Video Visit: Yon Rojas MD            ONCOLOGY VISIT:    NAME: Esther Vegas     DATE: 11/23/2020    PRIMARY CARE PHYSICIAN: Yana Diaz    PATIENT ID: Grade I, stage IIb, T3N0M0, ER positive, NH positive, HER2 non-amplified invasive lobular carcinoma of the left breast. Oncotype DX recurrence score = 11.     Oncology History: Ms. Vegas is a 65 year old female with a h/o invasive lobular cancer of the left breast. Routine screening mammogram on 10/23/15 demonstrated a spiculated lesion in the upper outer left breast. Ultrasound showed a 0.6 cm mass with indistinct margins at the 1:30 position, 3 cm from the nipple and a second 0.6 cm mass at the 1:00 position, 5 cm from the nipple. Contrast enhanced mammogram then showed non-masslike clumped enhancement in a segmental distribution from the 1 to 3:00 position of the left breast measuring 7.5 cm. Biopsy of the mass at the 1:00 position showed a grade 1 invasive lobular carcinoma and classical type LCIS. Estrogen receptor staining was strongly positive in >95% of tumor cell nuclei. Progesterone receptor staining was moderately positive in 60% of tumor cell nuclei. HER2 was nonamplified by FISH with a HER2 to SAVAGE-17 ratio of 1.2.     Left axillary sentinel lymph node biopsy and left nipple-sparing mastectomy with immediate reconstruction was performed on 12/21/15. Pathology showed invasive lobular carcinoma, Damari grade 2, involving the upper outer quadrant, central breast and lower inner quadrant, size 5.4 cm. There was LCIS, classical type. Surgical margins were negative. A single sentinel LN was negative. [id number]. Oncotype dx recurrence score was 11.  Adjuvant postmastectomy chest wall radiation was not recommended based on lack of high risk features.  She has been on adjuvant letrozole since 1/13/16.    Interim History:   Ms.  Shasta was contacted today via video visit due to the ongoing pandemic.  She continues on treatment with letrozole.  She overall is tolerating treatment well.  She admits that she has been a bit down during the pandemic.  She denies symptoms of clinical depression.  She has been continuing to exercise on a regular basis.  She is walking/jogging approximately 5 miles per day.  She denies current bone or joint aches or pains.  She has no current hot flashes or vaginal symptoms.  She has been in good health and specifically has had no fevers, chills, cough, or shortness of breath.  She states early in the pandemic she had exacerbation of acid reflux, so she restarted taking Pepcid.  Taking Pepcid helped her symptoms.  She does not want to be on the medication long-term, and therefore only takes it for a couple of weeks at a time.  She tries to avoid taking ranitidine as she is aware of the cancer risk associated with it.  If she has acid reflux despite Pepcid she will take Tums.  The remainder of a complete 12 point review of systems was reviewed with the patient was negative the exception of that mentioned above.    PAST MEDICAL HISTORY:   Past Medical History:   Diagnosis Date     BCC (basal cell carcinoma of skin)      Breast cancer (H) 2015    left, invasive lobular     GERD (gastroesophageal reflux disease)      Other malignant neoplasm of skin, site unspecified      Tinnitus      Tubular adenoma of colon      Unsatisfactory cervical cytology smear 12/04/2018 12/04/18: See problem list.      Variants of migraine, not elsewhere classified, without mention of intractable migraine without mention of status migrainosus     menstrual migraines       PAST SURGICAL HISTORY:  Past Surgical History:   Procedure Laterality Date     BIOPSY NODE SENTINEL Left 12/21/2015    Procedure: BIOPSY NODE SENTINEL;  Surgeon: Francisco Gomez MD;  Location: [facility] OR     COLONOSCOPY  2006     HEMORRHOIDECTOMY  5/2006     MASTECTOMY,  RECONSTRUCT BREAST, COMBINED Left 12/21/2015    Procedure: COMBINED MASTECTOMY, RECONSTRUCT BREAST;  Surgeon: Francisco Gomez MD;  Location: [facility] OR     MASTOPEXY Right 2/15/2016    Procedure: MASTOPEXY;  Surgeon: RADHA Friedman MD;  Location: [facility] OR     RECONSTRUCT BREAST Left 12/21/2015    Procedure: RECONSTRUCT BREAST;  Surgeon: Francisco Gomez MD;  Location: [facility]     REMOVE AND REPLACE BREAST IMPLANT PROSTHESIS Left 2/15/2016    Procedure: REMOVE AND REPLACE BREAST IMPLANT PROSTHESIS;  Surgeon: RADHA Friedman MD;  Location: [facility] OR       MEDS:  Current Outpatient Medications   Medication     calcium-vitamin D 500-125 MG-UNIT TABS     cetirizine (ZYRTEC) 10 MG tablet     letrozole (FEMARA) 2.5 MG tablet     No current facility-administered medications for this visit.        ALLERGIES:  Allergies   Allergen Reactions     Bactrim [Sulfamethoxazole W/Trimethoprim]      Facial redness     Codeine      Dizziness and nausea     Vicodin [Hydrocodone-Acetaminophen] Nausea        PHYSICAL EXAM:  General:  Well appearing adult female in NAD.  Eyes:  No erythema or discharge  Respiratory:  Breathing comfortably on room air.  No wheezing or distress.  Musculoskeletal:  Full ROM of the bilateral upper extremities.  Skin:  No visible concerning skin rashes or lesions  Neuro:  No notable tremor and dyskinetic movements.  Psych:  Mood and affect appear normal.    The rest of a comprehensive physical examination is deferred due to PHE (public health emergency) video visit restrictions.      LABS REVIEWED THIS VISIT:  11/23/2020 Right breast screening mammogram:  No significant changes on my view, radiology read is pending.    IMPRESSION/PLAN: 66 yo female with a h/o T3N0M0, grade I, ER positive, OH positive, HER2 negative invasive lobular carcinoma of the left breast. She is s/p treatment with left breast mastectomy and has been on letrozole since 01/13/2016.    1.  Left breast estrogen receptor-positive cancer:  Ms.  Shasta is 4 years, 11 months out from excision of a left breast cancer.  She continues on letrozole.  She is overall tolerating the medication well.  We are planning on treating with a total 10 years (through 01/2026) of endocrine therapy.      She is asymptomatic of disease recurrence on history taken today.  Right breast screening mammogram performed today was reviewed and is without change when compared to prior.  Of note, the radiology read was still pending at the time of our visit.  Will schedule next visit in 6 months.      2.  Bone health:  She is at increased risk of osteoporosis on letrozole. We also discussed increased risk of osteoporosis associated with PPIs.  She will continue to use them on an intermittent basis only.  DEXA in 05/2020 with a lowest T-score of -0.8.  Continue calcium 1200 mg and vitamin D 1000 IU daily as well as walk a half hour daily.      3.  GERD:  Continue to take Pepcid and Tums prn.  We discussed if persistent symptoms despite these, would recommend referral to Gastroenterology for consideration of endoscopy and treatments other than PPI for acid reflux.    4.  Stress/Coping:  She is dealing well with the increased stress caused by the pandemic.  She continues to exercise on a regular basis.  She has a good support system as well.    5.  Followup:  Return visit with me in approximately 6 months.    I spent a total of 20 minutes in video visit with this patient today.               Again, thank you for allowing me to participate in the care of your patient.        Sincerely,        Genevieve Rojas MD

## 2021-01-10 ENCOUNTER — HEALTH MAINTENANCE LETTER (OUTPATIENT)
Age: 66
End: 2021-01-10

## 2021-01-25 ENCOUNTER — IMMUNIZATION (OUTPATIENT)
Dept: NURSING | Facility: CLINIC | Age: 66
End: 2021-01-25
Payer: COMMERCIAL

## 2021-01-25 PROCEDURE — 91300 PR COVID VAC PFIZER DIL RECON 30 MCG/0.3 ML IM: CPT

## 2021-01-25 PROCEDURE — 0001A PR COVID VAC PFIZER DIL RECON 30 MCG/0.3 ML IM: CPT

## 2021-02-15 ENCOUNTER — IMMUNIZATION (OUTPATIENT)
Dept: NURSING | Facility: CLINIC | Age: 66
End: 2021-02-15
Attending: FAMILY MEDICINE
Payer: COMMERCIAL

## 2021-02-15 PROCEDURE — 0002A PR COVID VAC PFIZER DIL RECON 30 MCG/0.3 ML IM: CPT

## 2021-02-15 PROCEDURE — 91300 PR COVID VAC PFIZER DIL RECON 30 MCG/0.3 ML IM: CPT

## 2021-05-08 ENCOUNTER — HEALTH MAINTENANCE LETTER (OUTPATIENT)
Age: 66
End: 2021-05-08

## 2021-05-23 NOTE — PROGRESS NOTES
ONCOLOGY VISIT:    NAME: Esther Vegas       DATE: 5/24/2021    PRIMARY CARE PHYSICIAN: Yana Diaz    PATIENT ID: Grade I, stage IIb, T3N0M0, ER positive, IL positive, HER2 non-amplified invasive lobular carcinoma of the left breast. Oncotype DX recurrence score = 11.     Oncology History: Ms. Vegas is a 65 year old female with a h/o invasive lobular cancer of the left breast. Routine screening mammogram on 10/23/15 demonstrated a spiculated lesion in the upper outer left breast. Ultrasound showed a 0.6 cm mass with indistinct margins at the 1:30 position, 3 cm from the nipple and a second 0.6 cm mass at the 1:00 position, 5 cm from the nipple. Contrast enhanced mammogram then showed non-masslike clumped enhancement in a segmental distribution from the 1 to 3:00 position of the left breast measuring 7.5 cm. Biopsy of the mass at the 1:00 position showed a grade 1 invasive lobular carcinoma and classical type LCIS. Estrogen receptor staining was strongly positive in >95% of tumor cell nuclei. Progesterone receptor staining was moderately positive in 60% of tumor cell nuclei. HER2 was nonamplified by FISH with a HER2 to SAVAGE-17 ratio of 1.2.     Left axillary sentinel lymph node biopsy and left nipple-sparing mastectomy with immediate reconstruction was performed on 12/21/15. Pathology showed invasive lobular carcinoma, [place] grade 2, involving the upper outer quadrant, central breast and lower inner quadrant, size 5.4 cm. There was LCIS, classical type. Surgical margins were negative. A single sentinel LN was negative. [id number]. Oncotype dx recurrence score was 11.  Adjuvant postmastectomy chest wall radiation was not recommended based on lack of high risk features.  She has been on adjuvant letrozole since 1/13/16.    Interim History:   Ms. Vegas was seen in person today for routine breast cancer follow-up.  She continues on treatment with letrozole and is overall tolerating the medication well.   She states the only bothersome thing to her at this time is a pulled muscle beneath her right collarbone.  This past winter she slipped on the ice and did a windmill type motion with her arm, ever since, she has had soreness in the muscle in this area.  She denies that she actually fell.  She does not have a history of osteoporosis.  She denies concerning lumps, masses, pain, or swelling of either breast.  She reports tightness extending from her shoulder to the upper outer left breast.  She has had no fevers, chills, or infectious complaints.  She denies abdominal complaints.  She has no headaches or or focal neurologic complaints.  She is overall tolerating letrozole well with minimal side effects.  The remainder of a complete 12 point review of systems is reviewed with the patient was negative with exception that mentioned above.    PAST MEDICAL HISTORY:   Past Medical History:   Diagnosis Date     BCC (basal cell carcinoma of skin)      Breast cancer (H) 2015    left, invasive lobular     GERD (gastroesophageal reflux disease)      Other malignant neoplasm of skin, site unspecified      Tinnitus      Tubular adenoma of colon      Unsatisfactory cervical cytology smear 12/04/2018 12/04/18: See problem list.      Variants of migraine, not elsewhere classified, without mention of intractable migraine without mention of status migrainosus     menstrual migraines       PAST SURGICAL HISTORY:  Past Surgical History:   Procedure Laterality Date     BIOPSY NODE SENTINEL Left 12/21/2015    Procedure: BIOPSY NODE SENTINEL;  Surgeon: Francisco Gomez MD;  [facility]     COLONOSCOPY  2006     HEMORRHOIDECTOMY  5/2006     MASTECTOMY, RECONSTRUCT BREAST, COMBINED Left 12/21/2015    Procedure: COMBINED MASTECTOMY, RECONSTRUCT BREAST;  Surgeon: Francisco Gomez MD;  Location: [facility]     MASTOPEXY Right 2/15/2016    Procedure: MASTOPEXY;  Surgeon: RADHA Friedman MD;  Location: [facility]     RECONSTRUCT BREAST Left  "12/21/2015    Procedure: RECONSTRUCT BREAST;  Surgeon: Francisco Gomez MD;  Location: [facility]     REMOVE AND REPLACE BREAST IMPLANT PROSTHESIS Left 2/15/2016    Procedure: REMOVE AND REPLACE BREAST IMPLANT PROSTHESIS;  Surgeon: RADHA Friedman MD;  Location: [facility]       MEDS:  Current Outpatient Medications   Medication     calcium-vitamin D 500-125 MG-UNIT TABS     cetirizine (ZYRTEC) 10 MG tablet     letrozole (FEMARA) 2.5 MG tablet     No current facility-administered medications for this visit.        ALLERGIES:  Allergies   Allergen Reactions     Bactrim [Sulfamethoxazole W/Trimethoprim]      Facial redness     Codeine      Dizziness and nausea     Vicodin [Hydrocodone-Acetaminophen] Nausea        PHYSICAL EXAM:  BP (!) 160/92 (BP Location: Right arm, Patient Position: Chair, Cuff Size: Adult Regular)   Pulse 76   Temp 98.1  F (36.7  C) (Oral)   Resp 14   Ht 1.588 m (5' 2.5\")   Wt 69.2 kg (152 lb 9.6 oz)   LMP 01/25/2007   SpO2 98%   BMI 27.47 kg/m    General:  Well appearing adult female in NAD.  HEENT:  Normocephalic.  Sclera anicteric.  MMM.  No lesions of the oropharynx.  Lymph:  No palpable cervical, supraclavicular, or axillary LAD.  Chest:  CTA bilaterally.  No wheezes or crackles.  CV:  RRR.  Nl S1 and S2.  No m/r/g.  Breast:  Right breast mastopexy.  Left breast mastectomy with implant reconstruction.  There is axillary cording right side with overlying tightness.  There are no discretely palpable masses in the right breast or overlying the left reconstructed breast.  Abd:  Soft/ND.   Ext:  No pitting edema of the bilateral lower extremities.    Musculo:  Full ROM of the bilateral upper extremities.  Neuro:  Cranial nerves grossly intact.  Psych:  Mood and affect appear normal.    LABS REVIEWED THIS VISIT:  No interim imaging or laboratory studies pertinent to today's visit.    IMPRESSION/PLAN: 64 yo female with a h/o T3N0M0, grade I, ER positive, GA positive, HER2 negative invasive " lobular carcinoma of the left breast. She is s/p treatment with left breast mastectomy and has been on letrozole since 01/13/2016.    1.  Left breast estrogen receptor-positive cancer:  Ms. Vegas is 5 years, 11 months out from excision of a left breast cancer.  She continues on letrozole.  She is overall tolerating the medication well.  We are planning on treating with a total 10 years (through 01/2026) of endocrine therapy.      She is asymptomatic of disease recurrence on history taken today.   Will schedule next visit in 6 months, she will be due for bilateral screening mammograms at the same time.      2.  Right chest muscle soreness:  She states this is not bothersome enough to require intervention today.    3.  Hypertension:  White coat hypertension is normal for her.  She states blood pressure was normal when checked at home this morning.    4.  Hyperlipidemia:  We reviewed risk of hypertriglyceridemia on letrozole.  She states recent fasting triglyceride level was 120 which is wnl.    3.  Bone health:  She is at increased risk of osteoporosis on letrozole. DEXA in 05/2020 with a lowest T-score of -0.8.  Continue calcium 1200 mg and vitamin D 1000 IU daily as well as walk a half hour daily.      4.  Followup:  Return 11/23/2021 or later for bilateral screening mammograms and visit with me.    25 minutes spent on the date of the encounter doing chart review, review of test results, interpretation of tests, patient visit and documentation.

## 2021-05-24 ENCOUNTER — ONCOLOGY VISIT (OUTPATIENT)
Dept: ONCOLOGY | Facility: CLINIC | Age: 66
End: 2021-05-24
Attending: INTERNAL MEDICINE
Payer: COMMERCIAL

## 2021-05-24 VITALS
OXYGEN SATURATION: 98 % | HEIGHT: 63 IN | SYSTOLIC BLOOD PRESSURE: 160 MMHG | RESPIRATION RATE: 14 BRPM | TEMPERATURE: 98.1 F | BODY MASS INDEX: 27.04 KG/M2 | HEART RATE: 76 BPM | DIASTOLIC BLOOD PRESSURE: 92 MMHG | WEIGHT: 152.6 LBS

## 2021-05-24 DIAGNOSIS — C50.812 MALIGNANT NEOPLASM OF OVERLAPPING SITES OF LEFT BREAST IN FEMALE, ESTROGEN RECEPTOR POSITIVE (H): Primary | ICD-10-CM

## 2021-05-24 DIAGNOSIS — Z17.0 MALIGNANT NEOPLASM OF OVERLAPPING SITES OF LEFT BREAST IN FEMALE, ESTROGEN RECEPTOR POSITIVE (H): Primary | ICD-10-CM

## 2021-05-24 DIAGNOSIS — Z12.31 VISIT FOR SCREENING MAMMOGRAM: ICD-10-CM

## 2021-05-24 PROCEDURE — G0463 HOSPITAL OUTPT CLINIC VISIT: HCPCS

## 2021-05-24 PROCEDURE — 99214 OFFICE O/P EST MOD 30 MIN: CPT | Performed by: INTERNAL MEDICINE

## 2021-05-24 ASSESSMENT — PAIN SCALES - GENERAL: PAINLEVEL: NO PAIN (0)

## 2021-05-24 ASSESSMENT — MIFFLIN-ST. JEOR: SCORE: 1193.38

## 2021-05-24 NOTE — NURSING NOTE
"Oncology Rooming Note    May 24, 2021 10:27 AM   Esther Vegas is a 65 year old female who presents for:    Chief Complaint   Patient presents with     Oncology Clinic Visit     [facility] RETURN - BREAST CANCER     Initial Vitals: BP (!) 160/92 (BP Location: Right arm, Patient Position: Chair, Cuff Size: Adult Regular)   Pulse 76   Temp 98.1  F (36.7  C) (Oral)   Resp 14   Ht 1.588 m (5' 2.5\")   Wt 69.2 kg (152 lb 9.6 oz)   LMP 01/25/2007   SpO2 98%   BMI 27.47 kg/m   Estimated body mass index is 27.47 kg/m  as calculated from the following:    Height as of this encounter: 1.588 m (5' 2.5\").    Weight as of this encounter: 69.2 kg (152 lb 9.6 oz). Body surface area is 1.75 meters squared.  No Pain (0) Comment: Data Unavailable   Patient's last menstrual period was 01/25/2007.  Allergies reviewed: Yes  Medications reviewed: Yes    Medications: Medication refills not needed today.  Pharmacy name entered into [facility]: [id number] [place] [address]    Clinical concerns: No new concerns. Crystal was notified.      Romulo Hernández LPN            "

## 2021-05-24 NOTE — LETTER
5/24/2021         RE: Esther Vegas  [address] [facility] [address]        Dear Colleague,    Thank you for referring your patient, Esther Vegas, to the [facility]. Please see a copy of my visit note below.      ONCOLOGY VISIT:    NAME: Esther Vegas       DATE: 5/24/2021    PRIMARY CARE PHYSICIAN: Yana Diaz    PATIENT ID: Grade I, stage IIb, T3N0M0, ER positive, RI positive, HER2 non-amplified invasive lobular carcinoma of the left breast. Oncotype DX recurrence score = 11.     Oncology History: Ms. Vegas is a 65 year old female with a h/o invasive lobular cancer of the left breast. Routine screening mammogram on 10/23/15 demonstrated a spiculated lesion in the upper outer left breast. Ultrasound showed a 0.6 cm mass with indistinct margins at the 1:30 position, 3 cm from the nipple and a second 0.6 cm mass at the 1:00 position, 5 cm from the nipple. Contrast enhanced mammogram then showed non-masslike clumped enhancement in a segmental distribution from the 1 to 3:00 position of the left breast measuring 7.5 cm. Biopsy of the mass at the 1:00 position showed a grade 1 invasive lobular carcinoma and classical type LCIS. Estrogen receptor staining was strongly positive in >95% of tumor cell nuclei. Progesterone receptor staining was moderately positive in 60% of tumor cell nuclei. HER2 was nonamplified by FISH with a HER2 to SAVAGE-17 ratio of 1.2.     Left axillary sentinel lymph node biopsy and left nipple-sparing mastectomy with immediate reconstruction was performed on 12/21/15. Pathology showed invasive lobular carcinoma, [place] grade 2, involving the upper outer quadrant, central breast and lower inner quadrant, size 5.4 cm. There was LCIS, classical type. Surgical margins were negative. A single sentinel LN was negative. [id number]. Oncotype dx recurrence score was 11.  Adjuvant postmastectomy chest wall radiation was not recommended based on lack  of high risk features.  She has been on adjuvant letrozole since 1/13/16.    Interim History:   Ms. Vegas was seen in person today for routine breast cancer follow-up.  She continues on treatment with letrozole and is overall tolerating the medication well.  She states the only bothersome thing to her at this time is a pulled muscle beneath her right collarbone.  This past winter she slipped on the ice and did a windmill type motion with her arm, ever since, she has had soreness in the muscle in this area.  She denies that she actually fell.  She does not have a history of osteoporosis.  She denies concerning lumps, masses, pain, or swelling of either breast.  She reports tightness extending from her shoulder to the upper outer left breast.  She has had no fevers, chills, or infectious complaints.  She denies abdominal complaints.  She has no headaches or or focal neurologic complaints.  She is overall tolerating letrozole well with minimal side effects.  The remainder of a complete 12 point review of systems is reviewed with the patient was negative with exception that mentioned above.    PAST MEDICAL HISTORY:   Past Medical History:   Diagnosis Date     BCC (basal cell carcinoma of skin)      Breast cancer (H) 2015    left, invasive lobular     GERD (gastroesophageal reflux disease)      Other malignant neoplasm of skin, site unspecified      Tinnitus      Tubular adenoma of colon      Unsatisfactory cervical cytology smear 12/04/2018 12/04/18: See problem list.      Variants of migraine, not elsewhere classified, without mention of intractable migraine without mention of status migrainosus     menstrual migraines       PAST SURGICAL HISTORY:  Past Surgical History:   Procedure Laterality Date     BIOPSY NODE SENTINEL Left 12/21/2015    Procedure: BIOPSY NODE SENTINEL;  Surgeon: Francisco Gomez MD;  Location: [facility]     COLONOSCOPY  2006     HEMORRHOIDECTOMY  5/2006     MASTECTOMY, RECONSTRUCT BREAST, COMBINED  "Left 12/21/2015    Procedure: COMBINED MASTECTOMY, RECONSTRUCT BREAST;  Surgeon: Francisco Gomez MD;  Location: [facility]     MASTOPEXY Right 2/15/2016    Procedure: MASTOPEXY;  Surgeon: RADHA Friedman MD;  Location: [facility]     RECONSTRUCT BREAST Left 12/21/2015    Procedure: RECONSTRUCT BREAST;  Surgeon: Francisco Gomez MD;  Location[facility]     REMOVE AND REPLACE BREAST IMPLANT PROSTHESIS Left 2/15/2016    Procedure: REMOVE AND REPLACE BREAST IMPLANT PROSTHESIS;  Surgeon: RADHA Friedman MD;  Location: [facility]       MEDS:  Current Outpatient Medications   Medication     calcium-vitamin D 500-125 MG-UNIT TABS     cetirizine (ZYRTEC) 10 MG tablet     letrozole (FEMARA) 2.5 MG tablet     No current facility-administered medications for this visit.        ALLERGIES:  Allergies   Allergen Reactions     Bactrim [Sulfamethoxazole W/Trimethoprim]      Facial redness     Codeine      Dizziness and nausea     Vicodin [Hydrocodone-Acetaminophen] Nausea        PHYSICAL EXAM:  BP (!) 160/92 (BP Location: Right arm, Patient Position: Chair, Cuff Size: Adult Regular)   Pulse 76   Temp 98.1  F (36.7  C) (Oral)   Resp 14   Ht 1.588 m (5' 2.5\")   Wt 69.2 kg (152 lb 9.6 oz)   LMP 01/25/2007   SpO2 98%   BMI 27.47 kg/m    General:  Well appearing adult female in NAD.  HEENT:  Normocephalic.  Sclera anicteric.  MMM.  No lesions of the oropharynx.  Lymph:  No palpable cervical, supraclavicular, or axillary LAD.  Chest:  CTA bilaterally.  No wheezes or crackles.  CV:  RRR.  Nl S1 and S2.  No m/r/g.  Breast:  Right breast mastopexy.  Left breast mastectomy with implant reconstruction.  There is axillary cording right side with overlying tightness.  There are no discretely palpable masses in the right breast or overlying the left reconstructed breast.  Abd:  Soft/ND.   Ext:  No pitting edema of the bilateral lower extremities.    Musculo:  Full ROM of the bilateral upper extremities.  Neuro:  Cranial nerves grossly " intact.  Psych:  Mood and affect appear normal.    LABS REVIEWED THIS VISIT:  No interim imaging or laboratory studies pertinent to today's visit.    IMPRESSION/PLAN: 64 yo female with a h/o T3N0M0, grade I, ER positive, NE positive, HER2 negative invasive lobular carcinoma of the left breast. She is s/p treatment with left breast mastectomy and has been on letrozole since 01/13/2016.    1.  Left breast estrogen receptor-positive cancer:  Ms. Vegas is 5 years, 11 months out from excision of a left breast cancer.  She continues on letrozole.  She is overall tolerating the medication well.  We are planning on treating with a total 10 years (through 01/2026) of endocrine therapy.      She is asymptomatic of disease recurrence on history taken today.   Will schedule next visit in 6 months, she will be due for bilateral screening mammograms at the same time.      2.  Right chest muscle soreness:  She states this is not bothersome enough to require intervention today.    3.  Hypertension:  White coat hypertension is normal for her.  She states blood pressure was normal when checked at home this morning.    4.  Hyperlipidemia:  We reviewed risk of hypertriglyceridemia on letrozole.  She states recent fasting triglyceride level was 120 which is wnl.    3.  Bone health:  She is at increased risk of osteoporosis on letrozole. DEXA in 05/2020 with a lowest T-score of -0.8.  Continue calcium 1200 mg and vitamin D 1000 IU daily as well as walk a half hour daily.      4.  Followup:  Return 11/23/2021 or later for bilateral screening mammograms and visit with me.    25 minutes spent on the date of the encounter doing chart review, review of test results, interpretation of tests, patient visit and documentation.                  Again, thank you for allowing me to participate in the care of your patient.        Sincerely,        Genevieve Rojas MD

## 2021-05-29 ENCOUNTER — RECORDS - HEALTHEAST (OUTPATIENT)
Dept: ADMINISTRATIVE | Facility: CLINIC | Age: 66
End: 2021-05-29

## 2021-07-19 DIAGNOSIS — C50.812 MALIGNANT NEOPLASM OF OVERLAPPING SITES OF LEFT BREAST IN FEMALE, ESTROGEN RECEPTOR POSITIVE (H): ICD-10-CM

## 2021-07-19 DIAGNOSIS — Z17.0 MALIGNANT NEOPLASM OF OVERLAPPING SITES OF LEFT BREAST IN FEMALE, ESTROGEN RECEPTOR POSITIVE (H): ICD-10-CM

## 2021-07-20 RX ORDER — LETROZOLE 2.5 MG/1
TABLET, FILM COATED ORAL
Qty: 90 TABLET | Refills: 3 | Status: SHIPPED | OUTPATIENT
Start: 2021-07-20 | End: 2022-07-18

## 2021-10-23 ENCOUNTER — HEALTH MAINTENANCE LETTER (OUTPATIENT)
Age: 66
End: 2021-10-23

## 2021-11-29 ENCOUNTER — ANCILLARY PROCEDURE (OUTPATIENT)
Dept: MAMMOGRAPHY | Facility: CLINIC | Age: 66
End: 2021-11-29
Attending: INTERNAL MEDICINE
Payer: COMMERCIAL

## 2021-11-29 ENCOUNTER — ONCOLOGY VISIT (OUTPATIENT)
Dept: ONCOLOGY | Facility: CLINIC | Age: 66
End: 2021-11-29
Attending: INTERNAL MEDICINE
Payer: COMMERCIAL

## 2021-11-29 VITALS
OXYGEN SATURATION: 99 % | DIASTOLIC BLOOD PRESSURE: 83 MMHG | WEIGHT: 155.5 LBS | HEART RATE: 74 BPM | SYSTOLIC BLOOD PRESSURE: 180 MMHG | BODY MASS INDEX: 27.99 KG/M2

## 2021-11-29 DIAGNOSIS — M94.9 DISORDER OF BONE AND CARTILAGE: ICD-10-CM

## 2021-11-29 DIAGNOSIS — Z12.31 VISIT FOR SCREENING MAMMOGRAM: ICD-10-CM

## 2021-11-29 DIAGNOSIS — Z79.811 LONG TERM (CURRENT) USE OF AROMATASE INHIBITORS: ICD-10-CM

## 2021-11-29 DIAGNOSIS — M89.9 DISORDER OF BONE AND CARTILAGE: ICD-10-CM

## 2021-11-29 DIAGNOSIS — I10 ESSENTIAL HYPERTENSION: ICD-10-CM

## 2021-11-29 DIAGNOSIS — Z17.0 MALIGNANT NEOPLASM OF OVERLAPPING SITES OF LEFT BREAST IN FEMALE, ESTROGEN RECEPTOR POSITIVE (H): Primary | ICD-10-CM

## 2021-11-29 DIAGNOSIS — C50.812 MALIGNANT NEOPLASM OF OVERLAPPING SITES OF LEFT BREAST IN FEMALE, ESTROGEN RECEPTOR POSITIVE (H): Primary | ICD-10-CM

## 2021-11-29 PROCEDURE — G0463 HOSPITAL OUTPT CLINIC VISIT: HCPCS

## 2021-11-29 PROCEDURE — 77067 SCR MAMMO BI INCL CAD: CPT | Mod: GC | Performed by: RADIOLOGY

## 2021-11-29 PROCEDURE — 99214 OFFICE O/P EST MOD 30 MIN: CPT | Performed by: INTERNAL MEDICINE

## 2021-11-29 PROCEDURE — 77063 BREAST TOMOSYNTHESIS BI: CPT | Mod: GC | Performed by: RADIOLOGY

## 2021-11-29 ASSESSMENT — PAIN SCALES - GENERAL: PAINLEVEL: NO PAIN (0)

## 2021-11-29 NOTE — PROGRESS NOTES
ONCOLOGY VISIT:    NAME: Esther Vegas       DATE: 11/29/2021    PRIMARY CARE PHYSICIAN: Yana Diaz    PATIENT ID: Grade I, stage IIb, T3N0M0, ER positive, [place] positive, HER2 non-amplified invasive lobular carcinoma of the left breast. Oncotype DX recurrence score = 11.     Oncology History: Ms. Vegas is a 66 year old female with a h/o invasive lobular cancer of the left breast. Routine screening mammogram on 10/23/15 demonstrated a spiculated lesion in the upper outer left breast. Ultrasound showed a 0.6 cm mass with indistinct margins at the 1:30 position, 3 cm from the nipple and a second 0.6 cm mass at the 1:00 position, 5 cm from the nipple. Contrast enhanced mammogram then showed non-masslike clumped enhancement in a segmental distribution from the 1 to 3:00 position of the left breast measuring 7.5 cm. Biopsy of the mass at the 1:00 position showed a grade 1 invasive lobular carcinoma and classical type LCIS. Estrogen receptor staining was strongly positive in >95% of tumor cell nuclei. Progesterone receptor staining was moderately positive in 60% of tumor cell nuclei. HER2 was nonamplified by FISH with a HER2 to SAVAGE-17 ratio of 1.2.     Left axillary sentinel lymph node biopsy and left nipple-sparing mastectomy with immediate reconstruction was performed on 12/21/15. Pathology showed invasive lobular carcinoma, [place] grade 2, involving the upper outer quadrant, central breast and lower inner quadrant, size 5.4 cm. There was LCIS, classical type. Surgical margins were negative. A single sentinel LN was negative. [id number]. Oncotype dx recurrence score was 11.  Adjuvant postmastectomy chest wall radiation was not recommended based on lack of high risk features.  She has been on adjuvant letrozole since 1/13/16.    Interim History:   Ms. Vegas comes in the clinic today for routine breast cancer follow-up.  She continues on letrozole and has been on the medication for some time now.  She  continues to tolerate it well.  She specifically denies hot flashes, vaginal dryness, or joint pains.  Her health has been good since her last visit.  She has had no hospitalizations, surgeries, or fractures.  She denies current breast concerns including lumps, swelling, or pain.  She has had no shortness of breath, cough, or chest pain.  She denies fevers, chills, or infectious complaints.  She confirms that she boosted her Covid vaccination at the end of October.  She has no current abdominal complaints.  She and her  are going to [place] and [place] in February.  The remainder of a complete 12 point review of systems was reviewed with the patient was negative with exception that mentioned above.    PAST MEDICAL HISTORY:   Past Medical History:   Diagnosis Date     BCC (basal cell carcinoma of skin)      Breast cancer (H) 2015    left, invasive lobular     GERD (gastroesophageal reflux disease)      Other malignant neoplasm of skin, site unspecified      Tinnitus      Tubular adenoma of colon      Unsatisfactory cervical cytology smear 12/04/2018 12/04/18: See problem list.      Variants of migraine, not elsewhere classified, without mention of intractable migraine without mention of status migrainosus     menstrual migraines       PAST SURGICAL HISTORY:  Past Surgical History:   Procedure Laterality Date     BIOPSY NODE SENTINEL Left 12/21/2015    Procedure: BIOPSY NODE SENTINEL;  Surgeon: Francisco Gomez MD;  Location: [facility]     COLONOSCOPY  2006     HEMORRHOIDECTOMY  5/2006     MASTECTOMY, RECONSTRUCT BREAST, COMBINED Left 12/21/2015    Procedure: COMBINED MASTECTOMY, RECONSTRUCT BREAST;  Surgeon: Francisco Gomez MD;  [facility]     MASTOPEXY Right 2/15/2016    Procedure: MASTOPEXY;  Surgeon: RADHA Friedman MD;  [facility]     RECONSTRUCT BREAST Left 12/21/2015    Procedure: RECONSTRUCT BREAST;  Surgeon: Francisco Gomez MD;  [facility]     REMOVE AND REPLACE BREAST IMPLANT  PROSTHESIS Left 2/15/2016    Procedure: REMOVE AND REPLACE BREAST IMPLANT PROSTHESIS;  Surgeon: RADHA Friedman MD;  Location: [facility]       MEDS:  Current Outpatient Medications   Medication     calcium-vitamin D 500-125 MG-UNIT TABS     cetirizine (ZYRTEC) 10 MG tablet     letrozole (FEMARA) 2.5 MG tablet     No current facility-administered medications for this visit.       ALLERGIES:  Allergies   Allergen Reactions     Bactrim [Sulfamethoxazole W/Trimethoprim]      Facial redness     Codeine      Dizziness and nausea     Vicodin [Hydrocodone-Acetaminophen] Nausea        PHYSICAL EXAM:  BP (!) 180/83   Pulse 74   Wt 70.5 kg (155 lb 8 oz)   LMP 01/25/2007   SpO2 99%   BMI 27.99 kg/m    LMP 01/25/2007   General:  Well appearing adult female in NAD.  Alert and oriented.  HEENT:  Normocephalic.  Sclera anicteric.  MMM.  No lesions of the oropharynx.  Lymph:  No palpable cervical, supraclavicular, or axillary LAD.  Chest:  CTA bilaterally.  No wheezes or crackles.  CV:  RRR.  Nl S1 and S2.  No m/r/g.  Breast:  Right breast mastopexy.  Left breast mastectomy with implant reconstruction.  There is axillary cording right side with overlying tightness unchanged from prior exam.  There are no discretely palpable masses in the right breast or overlying the left reconstructed breast.  Abd:  Soft/ND.   Ext:  No pitting edema of the bilateral lower extremities.    Musculo:  Full ROM of the bilateral upper extremities.  Neuro:  Cranial nerves grossly intact.  Psych:  Mood and affect appear normal.    LABS REVIEWED THIS VISIT:  11/29/2021 Right breast screening mammogram:  No significant change from one year prior.    IMPRESSION/PLAN: 67 yo female with a h/o T3N0M0, grade I, ER positive, NM positive, HER2 negative invasive lobular carcinoma of the left breast. She is s/p treatment with left breast mastectomy and has been on letrozole since 01/13/2016.    1.  Left breast estrogen receptor-positive cancer:  Ms. Vegas  is nearly 6 years out from excision of a left breast cancer.  She continues on letrozole.  She is overall tolerating the medication well.  We are planning on treating with a total 10 years (through 01/2026) of endocrine therapy.      She is asymptomatic of disease recurrence on history taken today.   Right breast screening mammogram was reviewed in clinic today and is without significant change from prior.  Will schedule next visit in 1 year, she will be due for bilateral screening mammograms at the time.      2.  Travel:  She was given a copy of her immunization records today.  She plans to touch base with travel clinic prior to her trip.    3.  Hypertension:  White coat hypertension is normal for her.  She states blood pressure has been in the 130's/70s at home.  She has not seen her PCP since prior to the pandemic and agrees to schedule an appointment.     4.  Bone health:  She is at increased risk of osteoporosis on letrozole. DEXA in 05/2020 with a lowest T-score of -0.8.  Continue calcium 1200 mg and vitamin D 1000 IU daily as well as walk a half hour daily.  Will repeat a DEXA at the time of her return visit.    5.  COVID vaccination:  Booster received on 10/25/2021.    6.  Followup:  Return 11/29/2022 or later for DEXA bone density scan, bilateral screening mammograms and visit with me.    30 minutes spent on the date of the encounter doing chart review, review of test results, interpretation of tests, patient visit and documentation

## 2021-11-29 NOTE — NURSING NOTE
"Oncology Rooming Note    November 29, 2021 10:39 AM   Esther Vegas is a 66 year old female who presents for:    Chief Complaint   Patient presents with     Oncology Clinic Visit     breast cancer     Initial Vitals: BP (!) 180/83   Pulse 74   Wt 70.5 kg (155 lb 8 oz)   LMP 01/25/2007   SpO2 99%   BMI 27.99 kg/m   Estimated body mass index is 27.99 kg/m  as calculated from the following:    Height as of 5/24/21: 1.588 m (5' 2.5\").    Weight as of this encounter: 70.5 kg (155 lb 8 oz). Body surface area is 1.76 meters squared.  No Pain (0) Comment: Data Unavailable   Patient's last menstrual period was 01/25/2007.  Allergies reviewed: Yes  Medications reviewed: Yes    Medications: Medication refills not needed today.  Pharmacy name entered into [facility]: [id number] [place] [address]    Clinical concerns: none       Raisa Kent CMA            "

## 2021-11-29 NOTE — LETTER
11/29/2021         RE: Esther Vegas  [address] [facility] [address]        Dear Colleague,    Thank you for referring your patient, Esther Vegas, to the [facility]. Please see a copy of my visit note below.      ONCOLOGY VISIT:    NAME: Esther Vegas       DATE: 11/29/2021    PRIMARY CARE PHYSICIAN: Yana Diaz    PATIENT ID: Grade I, stage IIb, T3N0M0, ER positive, NC positive, HER2 non-amplified invasive lobular carcinoma of the left breast. Oncotype DX recurrence score = 11.     Oncology History: Ms. Vegas is a 66 year old female with a h/o invasive lobular cancer of the left breast. Routine screening mammogram on 10/23/15 demonstrated a spiculated lesion in the upper outer left breast. Ultrasound showed a 0.6 cm mass with indistinct margins at the 1:30 position, 3 cm from the nipple and a second 0.6 cm mass at the 1:00 position, 5 cm from the nipple. Contrast enhanced mammogram then showed non-masslike clumped enhancement in a segmental distribution from the 1 to 3:00 position of the left breast measuring 7.5 cm. Biopsy of the mass at the 1:00 position showed a grade 1 invasive lobular carcinoma and classical type LCIS. Estrogen receptor staining was strongly positive in >95% of tumor cell nuclei. Progesterone receptor staining was moderately positive in 60% of tumor cell nuclei. HER2 was nonamplified by FISH with a HER2 to SAVAGE-17 ratio of 1.2.     Left axillary sentinel lymph node biopsy and left nipple-sparing mastectomy with immediate reconstruction was performed on 12/21/15. Pathology showed invasive lobular carcinoma, [place] grade 2, involving the upper outer quadrant, central breast and lower inner quadrant, size 5.4 cm. There was LCIS, classical type. Surgical margins were negative. A single sentinel LN was negative. [id number]. Oncotype dx recurrence score was 11.  Adjuvant postmastectomy chest wall radiation was not recommended based on  lack of high risk features.  She has been on adjuvant letrozole since 1/13/16.    Interim History:   Ms. Vegas comes in the clinic today for routine breast cancer follow-up.  She continues on letrozole and has been on the medication for some time now.  She continues to tolerate it well.  She specifically denies hot flashes, vaginal dryness, or joint pains.  Her health has been good since her last visit.  She has had no hospitalizations, surgeries, or fractures.  She denies current breast concerns including lumps, swelling, or pain.  She has had no shortness of breath, cough, or chest pain.  She denies fevers, chills, or infectious complaints.  She confirms that she boosted her Covid vaccination at the end of October.  She has no current abdominal complaints.  She and her  are going to [place] and [place] in February.  The remainder of a complete 12 point review of systems was reviewed with the patient was negative with exception that mentioned above.    PAST MEDICAL HISTORY:   Past Medical History:   Diagnosis Date     BCC (basal cell carcinoma of skin)      Breast cancer (H) 2015    left, invasive lobular     GERD (gastroesophageal reflux disease)      Other malignant neoplasm of skin, site unspecified      Tinnitus      Tubular adenoma of colon      Unsatisfactory cervical cytology smear 12/04/2018 12/04/18: See problem list.      Variants of migraine, not elsewhere classified, without mention of intractable migraine without mention of status migrainosus     menstrual migraines       PAST SURGICAL HISTORY:  Past Surgical History:   Procedure Laterality Date     BIOPSY NODE SENTINEL Left 12/21/2015    Procedure: BIOPSY NODE SENTINEL;  Surgeon: Francisco Gomez MD;  [facility]: [facility]     COLONOSCOPY  2006     HEMORRHOIDECTOMY  5/2006     MASTECTOMY, RECONSTRUCT BREAST, COMBINED Left 12/21/2015    Procedure: COMBINED MASTECTOMY, RECONSTRUCT BREAST;  Surgeon: Francisco Gomez MD;  [facility]: [facility]     MASTOPEXY  Right 2/15/2016    Procedure: MASTOPEXY;  Surgeon: RADHA Friedman MD;  [facility]: [facility]     RECONSTRUCT BREAST Left 12/21/2015    Procedure: RECONSTRUCT BREAST;  Surgeon: Francisco Gomez MD;  [facility]     REMOVE AND REPLACE BREAST IMPLANT PROSTHESIS Left 2/15/2016    Procedure: REMOVE AND REPLACE BREAST IMPLANT PROSTHESIS;  Surgeon: RADHA Friedman MD;  [facility]       MEDS:  Current Outpatient Medications   Medication     calcium-vitamin D 500-125 MG-UNIT TABS     cetirizine (ZYRTEC) 10 MG tablet     letrozole (FEMARA) 2.5 MG tablet     No current facility-administered medications for this visit.       ALLERGIES:  Allergies   Allergen Reactions     Bactrim [Sulfamethoxazole W/Trimethoprim]      Facial redness     Codeine      Dizziness and nausea     Vicodin [Hydrocodone-Acetaminophen] Nausea        PHYSICAL EXAM:  BP (!) 180/83   Pulse 74   Wt 70.5 kg (155 lb 8 oz)   LMP 01/25/2007   SpO2 99%   BMI 27.99 kg/m    LMP 01/25/2007   General:  Well appearing adult female in NAD.  Alert and oriented.  HEENT:  Normocephalic.  Sclera anicteric.  MMM.  No lesions of the oropharynx.  Lymph:  No palpable cervical, supraclavicular, or axillary LAD.  Chest:  CTA bilaterally.  No wheezes or crackles.  CV:  RRR.  Nl S1 and S2.  No m/r/g.  Breast:  Right breast mastopexy.  Left breast mastectomy with implant reconstruction.  There is axillary cording right side with overlying tightness unchanged from prior exam.  There are no discretely palpable masses in the right breast or overlying the left reconstructed breast.  Abd:  Soft/ND.   Ext:  No pitting edema of the bilateral lower extremities.    Musculo:  Full ROM of the bilateral upper extremities.  Neuro:  Cranial nerves grossly intact.  Psych:  Mood and affect appear normal.    LABS REVIEWED THIS VISIT:  11/29/2021 Right breast screening mammogram:  No significant change from one year prior.    IMPRESSION/PLAN: 65 yo female with a h/o T3N0M0,  grade I, ER positive, [place] positive, HER2 negative invasive lobular carcinoma of the left breast. She is s/p treatment with left breast mastectomy and has been on letrozole since 01/13/2016.    1.  Left breast estrogen receptor-positive cancer:  Ms. Vegas is nearly 6 years out from excision of a left breast cancer.  She continues on letrozole.  She is overall tolerating the medication well.  We are planning on treating with a total 10 years (through 01/2026) of endocrine therapy.      She is asymptomatic of disease recurrence on history taken today.   Right breast screening mammogram was reviewed in clinic today and is without significant change from prior.  Will schedule next visit in 1 year, she will be due for bilateral screening mammograms at the time.      2.  Travel:  She was given a copy of her immunization records today.  She plans to touch base with travel clinic prior to her trip.    3.  Hypertension:  White coat hypertension is normal for her.  She states blood pressure has been in the 130's/70s at home.  She has not seen her PCP since prior to the pandemic and agrees to schedule an appointment.     4.  Bone health:  She is at increased risk of osteoporosis on letrozole. DEXA in 05/2020 with a lowest T-score of -0.8.  Continue calcium 1200 mg and vitamin D 1000 IU daily as well as walk a half hour daily.  Will repeat a DEXA at the time of her return visit.    5.  COVID vaccination:  Booster received on 10/25/2021.    6.  Followup:  Return 11/29/2022 or later for DEXA bone density scan, bilateral screening mammograms and visit with me.    30 minutes spent on the date of the encounter doing chart review, review of test results, interpretation of tests, patient visit and documentation       Genevieve Rojas MD

## 2022-01-28 ENCOUNTER — TRANSFERRED RECORDS (OUTPATIENT)
Dept: HEALTH INFORMATION MANAGEMENT | Facility: CLINIC | Age: 67
End: 2022-01-28
Payer: COMMERCIAL

## 2022-04-07 ENCOUNTER — VIRTUAL VISIT (OUTPATIENT)
Dept: FAMILY MEDICINE | Facility: CLINIC | Age: 67
End: 2022-04-07
Payer: COMMERCIAL

## 2022-04-07 DIAGNOSIS — U07.1 CLINICAL DIAGNOSIS OF COVID-19: Primary | ICD-10-CM

## 2022-04-07 PROCEDURE — 99213 OFFICE O/P EST LOW 20 MIN: CPT | Mod: 95 | Performed by: PHYSICIAN ASSISTANT

## 2022-04-07 NOTE — PATIENT INSTRUCTIONS
Maurice Eagle,     For treatment of COVID-19 you have been prescribed Paxlovid.  This was sent to the [facility].  [phone] [address] [facility] [address], [place] [address]    Hours:  Mon-Fri: 8:30a - 5:00p  Sat-Sun: 9:00a - 1:00p  Drive-thru available     As discussed, you may continue your prescribed medications while taking Paxlovid.  Please stop any over-the-counter supplements or vitamins while on this medication.  You may restart supplements and vitamins after completing Paxlovid.  If you develop any severe symptoms such as chest pain, coughing up blood, fevers that you cannot control, shortness of breath, or sudden rash, please follow-up at the emergency department.    Reach out with any questions or concerns.  Take Care,  Sarah Sandoval PA-C  Discharge Instructions for COVID-19 Patients  You were tested for COVID-19. Your result was positive. This means you do have COVID-19 (coronavirus). Follow the instructions below. If you were tested for an upcoming procedure, you should also contact your provider for next steps.   Is there medicine to treat COVID-19?  Yes, there are two kinds of treatment: Antiviral (virus-killing) medicine and antibody treatment (called monoclonal antibodies). These have been proven safe and effective. They may make you feel better faster, keep you out of the hospital, and prevent death.   It's very important to take them early in your illness before you get worse.   Who should take this medicine?   These treatments are for people who are not in the hospital, but they're at risk of getting very sick from COVID. This includes people who:    Are over age 65    Are members of the BIPOC community (black, indigenous and people of color)    Are overweight (body mass index is over 25)    Are inactive (don't exercise)    Are pregnant    Smoke or vape (now or in the past)    Have a disability    Have any of the following health problems: Diabetes, high blood pressure,  "cancer, heart problems, liver disease, lung disease, kidney disease, sickle cell disease, cystic fibrosis (CF), dementia and other neurological (brain) diseases, HIV, thalassemia or tuberculosis    Have ever had a stroke, organ transplant or blood cell transplant    Have a mental health problem or substance abuse disorder (drugs, alcohol)    Have a weak immune system (are immuno-compromised)  If your risk is high, we strongly urge you to get treated as soon as possible.     If symptoms started in the last 5 days: You may qualify for pills (antiviral medicines like Paxlovid and molnupiravir). To schedule an appointment to discuss COVID-19 treatment, you can:  ? Call your family clinic. Or, dial [phone] ([phone]) and press [phone].  ? Go to [email or web address] (click \"Schedule your appointment\").  ? Request an appointment on [employer] (select COVID-19 Treatment\").    If your symptoms started in the last 7 days: You may qualify for antibody shots. Fill out a request form at [email or web address]. (If you don't read and write in English, or you need help with the form, call your family clinic.) Not everyone is chosen for this treatment. If you're selected, someone will contact you within 1 to 2 business days.  How can I take care of myself at home?  1. Get lots of rest.  2. Drink extra fluids (unless a doctor has told you not to).  3. Take acetaminophen (Tylenol) for fever or pain. (If you have liver or kidney problems, first ask your family doctor if it's safe to take acetaminophen.  ? Adults may take either:    650 mg acetaminophen (two 325 mg pills) every 4 to 6 hours as needed (but no more than 10 pills per day), or     1,000 mg acetaminophen (two 500 mg pills) every 6 hours as needed (but no more than 6 pills per day).    Note: Don't take more than 3,000 mg of acetaminophen (Tylenol) in one day. Acetaminophen is found in many medicines (both prescribed and over-the-counter " medicines). Read all labels to be sure you don't take too much.  ? For children:    Check the acetaminophen (Tylenol) bottle to find out the right dose (based on their age or weight.)    Don't give children more than1,625 mg of acetaminophen in one day.  4. Know when to call 911. Emergency warning signs include:  ? Trouble breathing or shortness of breath  ? Pain or pressure in the chest that doesn't go away  ? Feeling confused like you haven't felt before, or not being able to wake up  ? Bluish-colored lips or face  If you have other health problems (like cancer, heart failure, an organ transplant or severe kidney disease): Call your specialty clinic if you don't feel better in the next 2 days.  How can I protect others?  If you DO have symptoms:    Stay home and away from others (self-isolate):  ? For at least 5 days after your symptoms started, AND UNTIL  ? You've had no fever for 24 hours--with no medicine that reduces fever, AND  ? Your other symptoms (such as a cough) are better.    Wear a mask or face covering for 10 full days anytime you're around other people.  If you DON'T have symptoms:    Stay at home and away from others   for at least 5 days after your positive test.    Wear a mask or face covering for 10 full days anytime you're around other people.  If you plan to visit a clinic or hospital, please check their visitor guidelines before you arrive--healthcare sites may have different rules. If you were tested because you're going to have surgery or another procedure, contact your care team for next steps.  If you were severely ill from COVID-19 or have a weak immune system: stay at home and away from others for at least 10 days. Ask your doctor about other actions you should take.   During self-isolation    Stay home until it's safe to be around others.    At home, stay away from other people and pets. Or, wear a well-fitting mask when you need to be around others.    Monitor your symptoms. If you  have any emergency warning signs (including trouble breathing), seek emergency medical care right away.    Stay in a separate room from other household members, if possible.    Use a separate bathroom, if possible.    Improve ventilation at home, if possible.    Don't share personal household items, like cups, towels, and utensils.  When can I go back to work?  You should NOT go back to work until you meet the guidelines above for ending your home isolation. You don't need to be re-tested for COVID-19 before going back to work. Studies show that you won't spread the virus if it's been at least   10 days since your symptoms started (or 20 days if you have a weak immune system).  Employers, schools and daycares: This document serves as formal notice of medical guidelines before your employee or student can return to work or school. They must meet the above guidelines before going back in person.   Where can I get more information?    [facility] - About COVID-19:   [email or web address]    [facility] - What to Do If You're Sick:   www.cdc.gov/coronavirus/2019-ncov/about/steps-when-sick.html    CDC - Ending Home Isolation:   www.cdc.gov/coronavirus/2019-ncov/your-health/quarantine-isolation.html    [facility] Clinical trials (COVID-19 research studies):  [email or web address].[email or web address]-clinical-trials    For informational purposes only. Not to replace the advice of your health care provider. Clinically reviewed by Dr. Zac Horvath.   Copyright   2020 [facility]. All rights reserved. [employer] [id number] - REV 03/10/22.

## 2022-04-07 NOTE — PROGRESS NOTES
"Esther Eagle is a 66 year old who is being evaluated via a billable video visit.      How would you like to obtain your AVS? MyChart  If the video visit is dropped, the invitation should be resent by: Text to cell phone: [phone]  Will anyone else be joining your video visit? No    Video Start Time: 9:44 AM    Assessment & Plan     Clinical diagnosis of COVID-19  Patient is a 66-year-old female who presents to video visit due to diagnosis of COVID-19 and interested in COVID-19 treatment options.  MASSBP score 2.  Patient does meet criteria for treatment.  Discussed options including monoclonal antibodies and Paxlovid.  Discussed risks, benefits, and medication interactions.  Patient would like to proceed with Paxlovid.  Discussed symptoms that warrant urgent/emergent follow-up and return precautions.  - nirmatrelvir and ritonavir (PAXLOVID) therapy pack; Take 3 tablets by mouth 2 times daily for 5 days Take 2 Nirmatrelvir tablets and 1 Ritonavir tablet twice daily for 5 days.      COVID-19 positive patient.  Encounter for consideration of medication intervention. Patient does qualify for a prescription. Full discussion with patient including medication options, risks and benefits. Potential drug interactions reviewed with patient.     Treatment Planned Paxlovid, Rx sent [facility]  [facility]   [phone]    [address] [place] [address]    Hours:  Mon-Fri: 8:30a - 5:00p  Sat-Sun: 9:00a - 1:00p    Drive-thru available   Temporary change to home medications: Prescribed medications reviewed.  No interactions.  Recommended holding multivitamins and OTC supplements while on Paxlovid.    Estimated body mass index is 27.99 kg/m  as calculated from the following:    Height as of 5/24/21: 1.588 m (5' 2.5\").    Weight as of 11/29/21: 70.5 kg (155 lb 8 oz).  GFR Estimate   Date Value Ref Range Status   12/06/2016 79 >60 mL/min/1.7m2 Final     Comment:     Non  GFR Calc "     No results found for: [id number]    Return in about 1 week (around 4/14/2022), or if symptoms worsen or fail to improve.    Sarah Sandoval PA-C  [facility] Eagle is a 66 year old who presents for the following health issues     HPI       COVID-19 Symptom Review  How many days ago did these symptoms start? Sx began 4/6/2022, home Covid test positive this morning    Are any of the following symptoms significant for you?    New or worsening difficulty breathing? No    Worsening cough? Yes, I am coughing up mucus.    Fever or chills? Yes, I felt feverish or had chills.    Headache: YES    Sore throat: YES    Chest pain: YES    Diarrhea: no    Body aches? YES    What treatments has patient tried? Tylenol   Does patient live in a nursing home, group home, or shelter? no  Does patient have a way to get food/medications during quarantined? Yes, I have a friend or family member who can help me.    Patient notes she had renal function checked through research project in 2020 and labs were normal.     BMI 28.3        MASSBP Score 4/7/2022   Age Greater than or equal to 65 years 2   BMI greater than or equal to 35 kg/m2 0   Has Diabetes Mellitus 0   Has Chronic Kidney Disease 0   Has Cardiovascular Disease and 55 years or older 0   Has Chronic Respiratory Disease and 55 years or older 0   Has Hypertension and 55 years or older 0   Is Immunocompromised 0   Is Pregnant 0   Member of BIPOC community (Black/, /, ,  or , or  or [place] Native)  0   MASSBP Score 2   Has the patient had a positive COVID test outside our system?  Yes   What day did symptoms start?  4/7/2022         Objective           Vitals:  No vitals were obtained today due to virtual visit.    Physical Exam   GENERAL: Healthy, alert and no distress  EYES: Eyes grossly normal to inspection.  No discharge or erythema, or obvious  scleral/conjunctival abnormalities.  RESP: Intermittent cough. No audible wheeze or visible cyanosis.  No visible retractions or increased work of breathing.    SKIN: Visible skin clear. No significant rash, abnormal pigmentation or lesions.  NEURO: Cranial nerves grossly intact.  Mentation and speech appropriate for age.  PSYCH: Mentation appears normal, affect normal/bright, judgement and insight intact, normal speech and appearance well-groomed.          Video-Visit Details    Type of service:  Video Visit    Video End Time:9:59 AM    Originating Location (pt. Location): Home    Distant Location (provider location):  [facility]     Platform used for Video Visit: Leonardo

## 2022-06-04 ENCOUNTER — HEALTH MAINTENANCE LETTER (OUTPATIENT)
Age: 67
End: 2022-06-04

## 2022-07-18 DIAGNOSIS — C50.812 MALIGNANT NEOPLASM OF OVERLAPPING SITES OF LEFT BREAST IN FEMALE, ESTROGEN RECEPTOR POSITIVE (H): ICD-10-CM

## 2022-07-18 DIAGNOSIS — Z17.0 MALIGNANT NEOPLASM OF OVERLAPPING SITES OF LEFT BREAST IN FEMALE, ESTROGEN RECEPTOR POSITIVE (H): ICD-10-CM

## 2022-07-18 RX ORDER — LETROZOLE 2.5 MG/1
TABLET, FILM COATED ORAL
Qty: 90 TABLET | Refills: 3 | Status: SHIPPED | OUTPATIENT
Start: 2022-07-18 | End: 2023-07-17

## 2022-07-18 NOTE — TELEPHONE ENCOUNTER
Medication:  Last prescribing provider: letrozole 2.5 mg tablet    Last clinic visit date: 11/29/21    Any missed appointments or no-shows since last clinic visit?: No    Recommendations for requested medication (if none, N/A): NA    Next clinic visit date: 12/5/22    Any other pertinent information (if none, N/A): NA    Pended and Routed to Provider.

## 2022-10-09 ENCOUNTER — HEALTH MAINTENANCE LETTER (OUTPATIENT)
Age: 67
End: 2022-10-09

## 2022-11-01 ASSESSMENT — ENCOUNTER SYMPTOMS
HEMATURIA: 0
MYALGIAS: 0
PALPITATIONS: 0
NERVOUS/ANXIOUS: 0
PARESTHESIAS: 0
FREQUENCY: 0
SHORTNESS OF BREATH: 0
DIZZINESS: 0
BREAST MASS: 0
HEARTBURN: 0
NAUSEA: 0
CHILLS: 0
COUGH: 0
HEADACHES: 0
HEMATOCHEZIA: 0
WEAKNESS: 0
EYE PAIN: 0
SORE THROAT: 0
DIARRHEA: 0
FEVER: 0
ABDOMINAL PAIN: 0
CONSTIPATION: 0
JOINT SWELLING: 0
ARTHRALGIAS: 0
DYSURIA: 0

## 2022-11-01 ASSESSMENT — ACTIVITIES OF DAILY LIVING (ADL): CURRENT_FUNCTION: NO ASSISTANCE NEEDED

## 2022-11-02 ENCOUNTER — OFFICE VISIT (OUTPATIENT)
Dept: FAMILY MEDICINE | Facility: CLINIC | Age: 67
End: 2022-11-02
Payer: COMMERCIAL

## 2022-11-02 VITALS
SYSTOLIC BLOOD PRESSURE: 166 MMHG | DIASTOLIC BLOOD PRESSURE: 94 MMHG | RESPIRATION RATE: 16 BRPM | BODY MASS INDEX: 27.36 KG/M2 | HEART RATE: 73 BPM | WEIGHT: 154.4 LBS | OXYGEN SATURATION: 94 % | HEIGHT: 63 IN

## 2022-11-02 DIAGNOSIS — Z13.220 SCREENING FOR HYPERLIPIDEMIA: ICD-10-CM

## 2022-11-02 DIAGNOSIS — I10 HYPERTENSION, UNSPECIFIED TYPE: ICD-10-CM

## 2022-11-02 DIAGNOSIS — Z11.59 NEED FOR HEPATITIS C SCREENING TEST: ICD-10-CM

## 2022-11-02 DIAGNOSIS — E66.3 OVERWEIGHT WITH BODY MASS INDEX (BMI) OF 27 TO 27.9 IN ADULT: ICD-10-CM

## 2022-11-02 DIAGNOSIS — Z17.0 MALIGNANT NEOPLASM OF OVERLAPPING SITES OF LEFT BREAST IN FEMALE, ESTROGEN RECEPTOR POSITIVE (H): ICD-10-CM

## 2022-11-02 DIAGNOSIS — C44.90 MALIGNANT NEOPLASM OF SKIN: ICD-10-CM

## 2022-11-02 DIAGNOSIS — E78.2 MIXED HYPERLIPIDEMIA: ICD-10-CM

## 2022-11-02 DIAGNOSIS — Z13.220 SCREENING, LIPID: ICD-10-CM

## 2022-11-02 DIAGNOSIS — Z12.11 SCREEN FOR COLON CANCER: ICD-10-CM

## 2022-11-02 DIAGNOSIS — Z00.00 ENCOUNTER FOR PREVENTIVE CARE: ICD-10-CM

## 2022-11-02 DIAGNOSIS — C50.812 MALIGNANT NEOPLASM OF OVERLAPPING SITES OF LEFT BREAST IN FEMALE, ESTROGEN RECEPTOR POSITIVE (H): ICD-10-CM

## 2022-11-02 DIAGNOSIS — D12.6 TUBULAR ADENOMA OF COLON: ICD-10-CM

## 2022-11-02 DIAGNOSIS — Z13.0 SCREENING, ANEMIA, DEFICIENCY, IRON: Primary | ICD-10-CM

## 2022-11-02 PROCEDURE — G0439 PPPS, SUBSEQ VISIT: HCPCS | Performed by: FAMILY MEDICINE

## 2022-11-02 PROCEDURE — 90662 IIV NO PRSV INCREASED AG IM: CPT | Performed by: FAMILY MEDICINE

## 2022-11-02 PROCEDURE — 91312 COVID-19,PF,PFIZER BOOSTER BIVALENT: CPT | Performed by: FAMILY MEDICINE

## 2022-11-02 PROCEDURE — 0124A COVID-19,PF,PFIZER BOOSTER BIVALENT: CPT | Performed by: FAMILY MEDICINE

## 2022-11-02 PROCEDURE — 99214 OFFICE O/P EST MOD 30 MIN: CPT | Mod: 25 | Performed by: FAMILY MEDICINE

## 2022-11-02 PROCEDURE — G0008 ADMIN INFLUENZA VIRUS VAC: HCPCS | Performed by: FAMILY MEDICINE

## 2022-11-02 RX ORDER — LOSARTAN POTASSIUM 25 MG/1
25 TABLET ORAL DAILY
Qty: 30 TABLET | Refills: 0 | Status: SHIPPED | OUTPATIENT
Start: 2022-11-02 | End: 2022-11-25

## 2022-11-02 RX ORDER — ACYCLOVIR 800 MG/1
TABLET ORAL PRN
COMMUNITY
Start: 2022-03-30

## 2022-11-02 ASSESSMENT — ENCOUNTER SYMPTOMS
FEVER: 0
JOINT SWELLING: 0
MYALGIAS: 0
SHORTNESS OF BREATH: 0
HEMATOCHEZIA: 0
DYSURIA: 0
HEMATURIA: 0
FREQUENCY: 0
CHILLS: 0
ABDOMINAL PAIN: 0
PARESTHESIAS: 0
DIARRHEA: 0
CONSTIPATION: 0
BREAST MASS: 0
DIZZINESS: 0
HEADACHES: 0
NAUSEA: 0
HEARTBURN: 0
WEAKNESS: 0
PALPITATIONS: 0
NERVOUS/ANXIOUS: 0
SORE THROAT: 0
COUGH: 0
ARTHRALGIAS: 0
EYE PAIN: 0

## 2022-11-02 ASSESSMENT — ACTIVITIES OF DAILY LIVING (ADL): CURRENT_FUNCTION: NO ASSISTANCE NEEDED

## 2022-11-02 NOTE — ASSESSMENT & PLAN NOTE
Managed by Dr. Emery at [facility] in [place]. Follows up annually and went 11/2021 and is due now to follow up.

## 2022-11-02 NOTE — ASSESSMENT & PLAN NOTE
Contraception - menopause  Recommended immunizations: Zoster, pneumonia, and hepatitis B series, but she got her COVID and influenza vaccines already today so she declined the above and will need to return for those in the future but they were recommended.  Pap: no indication.  Mammo: See breast cancer in the problem list.  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  Colonoscopy repeat ordered today  Std testing desired:  offered  Osteoporosis prevention discussed.  vitamin d levels ordered. Recommend daily calcium and vitamin d intake to keep good bone health. Recommend weight bearing exercise, no tobacco, and limit alcohol  dexa-normal bone density noted in 2020 and repeat bone density scan has been ordered by another provider already at this time.  Recommend sunscreen, exercise, & healthy diet.  Offered cbc, cmp, lipids and asked what other testing she  desires today  I have had an Advance Directives discussion with the patient.   Body mass index is 27.79 kg/m .   chio active.

## 2022-11-02 NOTE — PATIENT INSTRUCTIONS
HTN  Patient is asked to monitor BP at home or work, several times per month and return with written values at next office visit. Goal bp is 120/80. If staying higher than 130/85 on three occasions you should bring the values into clinic so that we can evaluate and treat if needed.    Recommend stop alcohol, caffiene, ibuprofen, carbonated drinks, sudafed & decrease salt intake. If you smoke, it is recommended that you quit. Keep weight in normal range.

## 2022-11-02 NOTE — PROGRESS NOTES
"Htn, hyperlipidemia, breast cancer addressed above and beyond usual scope of annual exam.     SUBJECTIVE:   Esther Eagle is a 66 year old who presents for Preventive Visit.    Patient has been advised of split billing requirements and indicates understanding: Yes  Are you in the first 12 months of your Medicare coverage?  December 2021 or January 2022 at [facility], faxed RAMON for record    Healthy Habits:     In general, how would you rate your overall health?  Good    Frequency of exercise:  6-7 days/week    Duration of exercise:  30-45 minutes    Do you usually eat at least 4 servings of fruit and vegetables a day, include whole grains    & fiber and avoid regularly eating high fat or \"junk\" foods?  Yes    Taking medications regularly:  Yes    Medication side effects:  None    Ability to successfully perform activities of daily living:  No assistance needed    Home Safety:  No safety concerns identified    Hearing Impairment:  No hearing concerns    In the past 6 months, have you been bothered by leaking of urine?  No    In general, how would you rate your overall mental or emotional health?  Excellent      PHQ-2 Total Score: 0    Additional concerns today:  No  Imm/Inj  Pertinent negatives include no abdominal pain, arthralgias, chest pain, chills, congestion, coughing, fever, headaches, joint swelling, myalgias, nausea, rash, sore throat or weakness.     Do you feel safe in your environment? Yes    Have you ever done Advance Care Planning? (For example, a Health Directive, POLST, or a discussion with a medical provider or your loved ones about your wishes): No, advance care planning information given to patient to review.  Patient declined advance care planning discussion at this time.    Fall risk  Fallen 2 or more times in the past year?: No  Any fall with injury in the past year?: No  Cognitive Screening   1) Repeat 3 items (Leader, Season, Table)    2) Clock draw: NORMAL  3) 3 item recall: " Recalls 3 objects  Results: 3 items recalled: COGNITIVE IMPAIRMENT LESS LIKELY    Mini-CogTM Copyright S Grace. Licensed by the author for use in [facility]; reprinted with permission (austyn@[facility]). All rights reserved.      Do you have sleep apnea, excessive snoring or daytime drowsiness?: some snoring    Reviewed and updated as needed this visit by clinical staff   Tobacco  Allergies  Meds  Problems  Med Hx  Surg Hx  Fam Hx  Soc   Hx        Reviewed and updated as needed this visit by Provider   Tobacco  Allergies  Meds  Problems  Med Hx  Surg Hx  Fam Hx  Soc   Hx       Social History     Tobacco Use     Smoking status: Never     Smokeless tobacco: Never   Substance Use Topics     Alcohol use: Yes     Alcohol/week: 2.0 - 3.0 standard drinks     Types: 2 - 3 Standard drinks or equivalent per week     Comment: 5 times weekly         Alcohol Use 11/1/2022   Prescreen: >3 drinks/day or >7 drinks/week? No     Current providers sharing in care for this patient include:   Patient Care Team:  Trinity Oropeza MD as PCP - General (Family Medicine)  Yana Diaz MD as MD (OB/Gyn)  Genevieve Rojas MD as MD (Internal Medicine)  RADHA Friedman MD as MD (Plastic Surgery)  Francisco Gomez MD as MD (General Surgery)  Faith Dasilva, RN as Specialty Care Coordinator (Breast Oncology)  Genevieve Rojas MD as Assigned Cancer Care Provider  Shagufta Emery MD as MD (Dermatology)    The following health maintenance items are reviewed in Epic and correct as of today:  Health Maintenance   Topic Date Due     HEPATITIS C SCREENING  Never done     ZOSTER IMMUNIZATION (1 of 2) Never done     Pneumococcal Vaccine: 65+ Years (1 - PCV) Never done     LIPID  12/06/2021     COLORECTAL CANCER SCREENING  11/10/2022     MAMMO SCREENING  11/29/2022     MEDICARE ANNUAL WELLNESS VISIT  11/02/2023     ANNUAL REVIEW OF HM ORDERS  11/02/2023     FALL RISK ASSESSMENT  11/02/2023      ADVANCE CARE PLANNING  11/02/2027     DTAP/TDAP/TD IMMUNIZATION (3 - Td or Tdap) 12/04/2027     DEXA  05/18/2035     PHQ-2 (once per calendar year)  Completed     INFLUENZA VACCINE  Completed     COVID-19 Vaccine  Completed     IPV IMMUNIZATION  Aged Out     MENINGITIS IMMUNIZATION  Aged Out     HEPATITIS B IMMUNIZATION  Discontinued     FHS-7:   Breast CA Risk Assessment (FHS-7) 11/29/2021   Did any of your first-degree relatives have breast or ovarian cancer? Yes   Did any of your relatives have bilateral breast cancer? No   Did any man in your family have breast cancer? No   Did any woman in your family have breast and ovarian cancer? No   Did any woman in your family have breast cancer before age 50 y? No   Do you have 2 or more relatives with breast and/or ovarian cancer? Yes   Do you have 2 or more relatives with breast and/or bowel cancer? Yes       mammograms managed by oncology.  Pertinent mammograms are reviewed under the imaging tab.    Review of Systems   Constitutional: Negative for chills and fever.   HENT: Negative for congestion, ear pain, hearing loss and sore throat.    Eyes: Negative for pain and visual disturbance.   Respiratory: Negative for cough and shortness of breath.    Cardiovascular: Negative for chest pain, palpitations and peripheral edema.   Gastrointestinal: Negative for abdominal pain, constipation, diarrhea, heartburn, hematochezia and nausea.   Breasts:  Negative for tenderness, breast mass and discharge.   Genitourinary: Negative for dysuria, frequency, genital sores, hematuria, pelvic pain, urgency, vaginal bleeding and vaginal discharge.   Musculoskeletal: Negative for arthralgias, joint swelling and myalgias.   Skin: Negative for rash.   Neurological: Negative for dizziness, weakness, headaches and paresthesias.   Psychiatric/Behavioral: Negative for mood changes. The patient is not nervous/anxious.          OBJECTIVE:   BP (!) 166/94 (BP Location: Left arm, Patient Position:  "Sitting, Cuff Size: Adult Regular)   Pulse 73   Resp 16   Ht 1.588 m (5' 2.5\")   Wt 70 kg (154 lb 6.4 oz)   LMP 01/25/2007   SpO2 94%   BMI 27.79 kg/m   Estimated body mass index is 27.79 kg/m  as calculated from the following:    Height as of this encounter: 1.588 m (5' 2.5\").    Weight as of this encounter: 70 kg (154 lb 6.4 oz).  Physical Exam  Constitutional:       Appearance: Normal appearance.   HENT:      Head: Normocephalic and atraumatic.      Right Ear: Tympanic membrane, ear canal and external ear normal. There is no impacted cerumen.      Left Ear: Tympanic membrane, ear canal and external ear normal. There is no impacted cerumen.      Nose: Nose normal. No congestion or rhinorrhea.      Mouth/Throat:      Mouth: Mucous membranes are moist.      Pharynx: Oropharynx is clear. No oropharyngeal exudate or posterior oropharyngeal erythema.   Eyes:      Extraocular Movements: Extraocular movements intact.      Conjunctiva/sclera: Conjunctivae normal.   Cardiovascular:      Rate and Rhythm: Normal rate and regular rhythm.      Heart sounds: Normal heart sounds.   Pulmonary:      Effort: Pulmonary effort is normal.      Breath sounds: Normal breath sounds.   Abdominal:      General: Bowel sounds are normal.      Palpations: Abdomen is soft.   Musculoskeletal:         General: Normal range of motion.      Cervical back: Normal range of motion and neck supple.   Skin:     General: Skin is warm.      Capillary Refill: Capillary refill takes less than 2 seconds.   Neurological:      General: No focal deficit present.      Mental Status: She is alert and oriented to person, place, and time.   Psychiatric:         Mood and Affect: Mood normal.         Behavior: Behavior normal.         Thought Content: Thought content normal.         Judgment: Judgment normal.         ASSESSMENT / PLAN:     Problem List Items Addressed This Visit        Digestive    Tubular adenoma of colon     Colonoscopy done in 2017 and " recommended to repeat in 5 years.  She should have a colonoscopy now so that order is placed.            Endocrine    Mixed hyperlipidemia     Hyperlipidemia noted when reviewing labs.  We will recheck lipids today and plan follow-up visit if elevated to discuss medication initiation.            Circulatory    HTN (hypertension)     New htn  Elevated bp without current medication. Recommend start anti htn med and weight reduction.     Losartan 25 mg po  q day started. cmp today  Follow up in 1 month bmp and bp recheck.    HTN info printed with avs.  Patient is asked to monitor BP at home or work, several times per month and return with written values at next office visit. Goal bp is 120/80. If staying higher than 130/85 on three occasions you should bring the values into clinic so that we can evaluate and treat if needed.    Recommend stop alcohol, caffiene, ibuprofen, carbonated drinks, sudafed & decrease salt intake. If you smoke, it is recommended that you quit. Keep weight in normal range.          Relevant Medications    losartan (COZAAR) 25 MG tablet       Musculoskeletal and Integumentary    Other malignant neoplasm of skin, site unspecified     Managed by Dr. Emery at [facility] in [place]. Follows up annually and went 11/2021 and is due now to follow up.            Other    Malignant neoplasm of left breast (H)     New to me today.  Managed by Dr. Rojas  She is on letrozol plans to continue for 10 years.   Has annual visits with oncology.  No chemo  No radiation  She has left mastectomy  She gets right mammograms and it is scheduled in December already.          Overweight with body mass index (BMI) of 27 to 27.9 in adult     Healthy lifestyle discussed.          Encounter for preventive care     Contraception - menopause  Recommended immunizations: Zoster, pneumonia, and hepatitis B series, but she got her COVID and influenza vaccines already today so she declined the above and will need to  "return for those in the future but they were recommended.  Pap: no indication.  Mammo: See breast cancer in the problem list.  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  Colonoscopy repeat ordered today  Std testing desired:  offered  Osteoporosis prevention discussed.  vitamin d levels ordered. Recommend daily calcium and vitamin d intake to keep good bone health. Recommend weight bearing exercise, no tobacco, and limit alcohol  dexa-normal bone density noted in 2020 and repeat bone density scan has been ordered by another provider already at this time.  Recommend sunscreen, exercise, & healthy diet.  Offered cbc, cmp, lipids and asked what other testing she  desires today  I have had an Advance Directives discussion with the patient.   Body mass index is 27.79 kg/m .   mychart active.        Other Visit Diagnoses     Screening, anemia, deficiency, iron    -  Primary    Relevant Orders    CBC with platelets    Need for hepatitis C screening test        Relevant Orders    Hepatitis C Screen Reflex to HCV RNA Quant and Genotype    Screening for hyperlipidemia        Relevant Orders    Lipid panel reflex to direct LDL Non-fasting    Comprehensive metabolic panel (BMP + Alb, Alk Phos, ALT, AST, Total. Bili, TP)    TSH with free T4 reflex    Screen for colon cancer        Relevant Orders    Colonoscopy Screening  Referral    Screening, lipid              Patient has been advised of split billing requirements and indicates understanding: Yes    COUNSELING:  Reviewed preventive health counseling, as reflected in patient instructions       Regular exercise       Healthy diet/nutrition       Safe sex practices/STD prevention       Hepatitis C screening       Advanced Planning     Estimated body mass index is 27.79 kg/m  as calculated from the following:    Height as of this encounter: 1.588 m (5' 2.5\").    Weight as of this encounter: 70 kg (154 lb 6.4 oz).    Weight management plan: Discussed healthy " diet and exercise guidelines    She reports that she has never smoked. She has never used smokeless tobacco.      Appropriate preventive services were discussed with this patient, including applicable screening as appropriate for cardiovascular disease, diabetes, osteopenia/osteoporosis, and glaucoma.  As appropriate for age/gender, discussed screening for colorectal cancer, prostate cancer, breast cancer, and cervical cancer. Checklist reviewing preventive services available has been given to the patient.    Reviewed patients plan of care and provided an AVS. The Basic Care Plan (routine screening as documented in Health Maintenance) for Esther meets the Care Plan requirement. This Care Plan has been established and reviewed with the Patient.    Trinity Oropeza MD  [facility]    Identified Health Risks:

## 2022-11-02 NOTE — ASSESSMENT & PLAN NOTE
New to me today.  Managed by Dr. Rojas  She is on letrozol plans to continue for 10 years.   Has annual visits with oncology.  No chemo  No radiation  She has left mastectomy  She gets right mammograms and it is scheduled in December already.

## 2022-11-02 NOTE — ASSESSMENT & PLAN NOTE
Colonoscopy done in 2017 and recommended to repeat in 5 years.  She should have a colonoscopy now so that order is placed.

## 2022-11-02 NOTE — ASSESSMENT & PLAN NOTE
New htn  Elevated bp without current medication. Recommend start anti htn med and weight reduction.     Losartan 25 mg po  q day started. cmp today  Follow up in 1 month bmp and bp recheck.    HTN info printed with avs.  Patient is asked to monitor BP at home or work, several times per month and return with written values at next office visit. Goal bp is 120/80. If staying higher than 130/85 on three occasions you should bring the values into clinic so that we can evaluate and treat if needed.    Recommend stop alcohol, caffiene, ibuprofen, carbonated drinks, sudafed & decrease salt intake. If you smoke, it is recommended that you quit. Keep weight in normal range.

## 2022-11-02 NOTE — ASSESSMENT & PLAN NOTE
Hyperlipidemia noted when reviewing labs.  We will recheck lipids today and plan follow-up visit if elevated to discuss medication initiation.

## 2022-11-08 ENCOUNTER — LAB (OUTPATIENT)
Dept: LAB | Facility: CLINIC | Age: 67
End: 2022-11-08
Payer: COMMERCIAL

## 2022-11-08 DIAGNOSIS — Z13.0 SCREENING, ANEMIA, DEFICIENCY, IRON: ICD-10-CM

## 2022-11-08 DIAGNOSIS — Z13.220 SCREENING FOR HYPERLIPIDEMIA: ICD-10-CM

## 2022-11-08 DIAGNOSIS — Z11.59 NEED FOR HEPATITIS C SCREENING TEST: ICD-10-CM

## 2022-11-08 LAB
ALBUMIN SERPL BCG-MCNC: 4 G/DL (ref 3.5–5.2)
ALP SERPL-CCNC: 83 U/L (ref 35–104)
ALT SERPL W P-5'-P-CCNC: 21 U/L (ref 10–35)
ANION GAP SERPL CALCULATED.3IONS-SCNC: 14 MMOL/L (ref 7–15)
AST SERPL W P-5'-P-CCNC: 26 U/L (ref 10–35)
BILIRUB SERPL-MCNC: 0.4 MG/DL
BUN SERPL-MCNC: 17.2 MG/DL (ref 8–23)
CALCIUM SERPL-MCNC: 8.5 MG/DL (ref 8.8–10.2)
CHLORIDE SERPL-SCNC: 103 MMOL/L (ref 98–107)
CHOLEST SERPL-MCNC: 249 MG/DL
CREAT SERPL-MCNC: 0.71 MG/DL (ref 0.51–0.95)
DEPRECATED HCO3 PLAS-SCNC: 22 MMOL/L (ref 22–29)
ERYTHROCYTE [DISTWIDTH] IN BLOOD BY AUTOMATED COUNT: 12.9 % (ref 10–15)
GFR SERPL CREATININE-BSD FRML MDRD: >90 ML/MIN/1.73M2
GLUCOSE SERPL-MCNC: 96 MG/DL (ref 70–99)
HCT VFR BLD AUTO: 40.2 % (ref 35–47)
HCV AB SERPL QL IA: NONREACTIVE
HDLC SERPL-MCNC: 53 MG/DL
HGB BLD-MCNC: 13.4 G/DL (ref 11.7–15.7)
LDLC SERPL CALC-MCNC: 163 MG/DL
MCH RBC QN AUTO: 30.2 PG (ref 26.5–33)
MCHC RBC AUTO-ENTMCNC: 33.3 G/DL (ref 31.5–36.5)
MCV RBC AUTO: 91 FL (ref 78–100)
NONHDLC SERPL-MCNC: 196 MG/DL
PLATELET # BLD AUTO: 314 10E3/UL (ref 150–450)
POTASSIUM SERPL-SCNC: 4.6 MMOL/L (ref 3.4–5.3)
PROT SERPL-MCNC: 6.8 G/DL (ref 6.4–8.3)
RBC # BLD AUTO: 4.43 10E6/UL (ref 3.8–5.2)
SODIUM SERPL-SCNC: 139 MMOL/L (ref 136–145)
TRIGL SERPL-MCNC: 167 MG/DL
TSH SERPL DL<=0.005 MIU/L-ACNC: 1.62 UIU/ML (ref 0.3–4.2)
WBC # BLD AUTO: 6.1 10E3/UL (ref 4–11)

## 2022-11-08 PROCEDURE — 80061 LIPID PANEL: CPT

## 2022-11-08 PROCEDURE — 86803 HEPATITIS C AB TEST: CPT

## 2022-11-08 PROCEDURE — 84443 ASSAY THYROID STIM HORMONE: CPT

## 2022-11-08 PROCEDURE — 80053 COMPREHEN METABOLIC PANEL: CPT

## 2022-11-08 PROCEDURE — 36415 COLL VENOUS BLD VENIPUNCTURE: CPT

## 2022-11-08 PROCEDURE — 85027 COMPLETE CBC AUTOMATED: CPT

## 2022-11-24 DIAGNOSIS — I10 HYPERTENSION, UNSPECIFIED TYPE: ICD-10-CM

## 2022-11-25 NOTE — TELEPHONE ENCOUNTER
"Routing refill request to provider for review/approval because:  BP not in range.    Last Written Prescription Date:  11/2/22  Last Fill Quantity: 30,  # refills: 0   Last office visit provider:  11/2/22     Requested Prescriptions   Pending Prescriptions Disp Refills     losartan (COZAAR) 25 MG tablet [Pharmacy Med Name: LOSARTAN POTASSIUM 25 MG TAB] 30 tablet 0     Sig: TAKE 1 TABLET BY MOUTH EVERY DAY       Angiotensin-II Receptors Failed - 11/25/2022 12:06 PM        Failed - Last blood pressure under 140/90 in past 12 months     BP Readings from Last 3 Encounters:   11/02/22 (!) 166/94   11/29/21 (!) 180/83   05/24/21 (!) 160/92                 Passed - Recent (12 mo) or future (30 days) visit within the authorizing provider's specialty     Patient has had an office visit with the authorizing provider or a provider within the authorizing providers department within the previous 12 mos or has a future within next 30 days. See \"Patient Info\" tab in inbasket, or \"Choose Columns\" in Meds & Orders section of the refill encounter.              Passed - Medication is active on med list        Passed - Patient is age 18 or older        Passed - No active pregnancy on record        Passed - Normal serum creatinine on file in past 12 months     Recent Labs   Lab Test 11/08/22  0742   CR 0.71       Ok to refill medication if creatinine is low          Passed - Normal serum potassium on file in past 12 months     Recent Labs   Lab Test 11/08/22  0742   POTASSIUM 4.6                    Passed - No positive pregnancy test in past 12 months             Jd Cuello RN 11/25/22 12:06 PM  "

## 2022-11-29 RX ORDER — LOSARTAN POTASSIUM 25 MG/1
25 TABLET ORAL DAILY
Qty: 90 TABLET | Refills: 3 | Status: SHIPPED | OUTPATIENT
Start: 2022-11-29 | End: 2023-08-08

## 2022-12-04 NOTE — PROGRESS NOTES
ONCOLOGY VISIT:    NAME: Esther Vegas       DATE: 12/5/2022    PRIMARY CARE PHYSICIAN: Yana Diaz    PATIENT ID: Grade I, stage IIb, T3N0M0, ER positive, IN positive, HER2 non-amplified invasive lobular carcinoma of the left breast. Oncotype DX recurrence score = 11.     Oncology History: Ms. Vegas is a 67 year old female with a h/o invasive lobular cancer of the left breast. Routine screening mammogram on 10/23/15 demonstrated a spiculated lesion in the upper outer left breast. Ultrasound showed a 0.6 cm mass with indistinct margins at the 1:30 position, 3 cm from the nipple and a second 0.6 cm mass at the 1:00 position, 5 cm from the nipple. Contrast enhanced mammogram then showed non-masslike clumped enhancement in a segmental distribution from the 1 to 3:00 position of the left breast measuring 7.5 cm. Biopsy of the mass at the 1:00 position showed a grade 1 invasive lobular carcinoma and classical type LCIS. Estrogen receptor staining was strongly positive in >95% of tumor cell nuclei. Progesterone receptor staining was moderately positive in 60% of tumor cell nuclei. HER2 was nonamplified by FISH with a HER2 to SAVAGE-17 ratio of 1.2.     Left axillary sentinel lymph node biopsy and left nipple-sparing mastectomy with immediate reconstruction was performed on 12/21/15. Pathology showed invasive lobular carcinoma, [place] grade 2, involving the upper outer quadrant, central breast and lower inner quadrant, size 5.4 cm. There was LCIS, classical type. Surgical margins were negative. A single sentinel LN was negative. [id number]. Oncotype dx recurrence score was 11.  Adjuvant postmastectomy chest wall radiation was not recommended based on lack of high risk features.  She has been on adjuvant letrozole since 1/13/16.    Interim History:   Ms. Vegas comes into clinic for routine breast cancer follow up.  She is on treatment with letrozole.  She denies significant side effects from the medication.   Her health has been good over the last year.  She states she PCP started her on losartan for hypertension.  She reports a family history of hyperlipidemia and states her PCP has been monitoring this as well.  This past year, she and her spouse went on a tour and [place] and [place] that involved [place] 5 [place]  She states in the days after disembarking the boat, she had sensation that the ground was still rocking.  This sensation has since resolved.    She denies breast lumps, pain, or swelling.  She has no cough, shortness of breath, or chest pain.  She has chronic right shoulder pain.  She denies new bone or joint aches or pains.  She is leaving for a trip to [place] with her sisters tomorrow.  The remainder of a complete 12 point review of systems was reviewed with the patient and was negative with the exception of that mentioned above.    PAST MEDICAL HISTORY:   Past Medical History:   Diagnosis Date     BCC (basal cell carcinoma of skin)      Breast cancer (H) 2015    left, invasive lobular     GERD (gastroesophageal reflux disease)      HTN (hypertension) 11/2/2022     Other malignant neoplasm of skin, site unspecified      Tinnitus      Tubular adenoma of colon      Unsatisfactory cervical cytology smear 12/04/2018 12/04/18: See problem list.      Variants of migraine, not elsewhere classified, without mention of intractable migraine without mention of status migrainosus     menstrual migraines       PAST SURGICAL HISTORY:  Past Surgical History:   Procedure Laterality Date     BIOPSY NODE SENTINEL Left 12/21/2015    Procedure: BIOPSY NODE SENTINEL;  Surgeon: Francisco Gomez MD;  Location: [facility] OR     COLONOSCOPY  01/01/2006     EYE SURGERY  03/2020    Cataract right eye removed     HEMORRHOIDECTOMY  05/01/2006     MASTECTOMY, RECONSTRUCT BREAST, COMBINED Left 12/21/2015    Procedure: COMBINED MASTECTOMY, RECONSTRUCT BREAST;  Surgeon: Francisco Gomez MD;  Location: [facility] OR     MASTOPEXY Right  02/15/2016    Procedure: MASTOPEXY;  Surgeon: RADHA Friedman MD;  Location: [facility] OR     RECONSTRUCT BREAST Left 12/21/2015    Procedure: RECONSTRUCT BREAST;  Surgeon: Francisco Gomez MD;  Location: [facility] OR     REMOVE AND REPLACE BREAST IMPLANT PROSTHESIS Left 02/15/2016    Procedure: REMOVE AND REPLACE BREAST IMPLANT PROSTHESIS;  Surgeon: RADHA Friedman MD;  Location: [facility] OR       MEDS:  Current Outpatient Medications   Medication     acyclovir (ZOVIRAX) 800 MG tablet     calcium-vitamin D 500-125 MG-UNIT TABS     cetirizine (ZYRTEC) 10 MG tablet     letrozole (FEMARA) 2.5 MG tablet     losartan (COZAAR) 25 MG tablet     No current facility-administered medications for this visit.       ALLERGIES:  Allergies   Allergen Reactions     Bactrim [Sulfamethoxazole W/Trimethoprim]      Facial redness     Codeine      Dizziness and nausea     Vicodin [Hydrocodone-Acetaminophen] Nausea        PHYSICAL EXAM:  BP (!) 162/83   Pulse 75   Temp 98.1  F (36.7  C) (Oral)   Resp 16   Wt 70.2 kg (154 lb 11.2 oz)   LMP 01/25/2007   SpO2 100%   BMI 27.84 kg/m    General:  Well appearing adult female in NAD.  Alert and oriented.  HEENT:  Normocephalic.  Sclera anicteric.    Lymph:  No palpable cervical, supraclavicular, or axillary LAD.  Chest:  CTA bilaterally.  No wheezes or crackles.  CV:  RRR.  Nl S1 and S2.    Breast:  Right breast mastopexy.  Left breast mastectomy with implant reconstruction.  There is axillary cording left side with overlying tightness with extension of the arm.  There are no discretely palpable masses in the right breast or overlying the left reconstructed breast.  Abd:  Soft/ND.   Ext:  No pitting edema of the bilateral lower extremities.    Musculo:  Full ROM of the bilateral upper extremities.  Neuro:  Cranial nerves grossly intact.  Gait is stable.  Psych:  Mood and affect appear normal.    LABS REVIEWED THIS VISIT:  I personally reviewed the images of the following -     12/5/2022 Right  breast screening mammogram:  We reviewed the images together in clinic today.  On my view, there is no significant change when compared to prior.    12/5/2022 DEXA bone density scan:  Lowest T-score is in the right femoral neck = -0.7    IMPRESSION/PLAN: 68 yo female with a h/o T3N0M0, grade I, ER positive, MD positive, HER2 negative invasive lobular carcinoma of the left breast. She is s/p treatment with left breast mastectomy and has been on letrozole since 01/13/2016.    1.  Left breast estrogen receptor-positive cancer:  Ms. Vegas is 7 years out from excision of a left breast cancer.  She continues on letrozole.  She is tolerating the medication well.  Plan is to continue letrozole to complete a 10 year course, through 01/2026.      She is asymptomatic of disease recurrence on history taken today.   Right breast screening mammogram was reviewed in clinic today which is without new or concerning finding.  Of note, radiology read is pending and will be released to the patient when available.  Will schedule right breast mammogram and return visit in 1 year.    2.  Hypertension:  White coat hypertension is normal for her.  However, since last visit, her PCP has elected to start her on losartan.  BP is 160s/80s today.  Ongoing follow up with PCP.      3.  Bone health:  She is at increased risk of osteoporosis on letrozole. DEXA bone density score today with a lowest T-score of -0.7 which is within normal range.  Continue calcium 1200 mg and vitamin D 1000 IU daily as well as a half hour of daily weight bearing activity.      4.  Followup:    Right breast screening mammogram and visit with me in 1 year.    30 minutes spent on the date of the encounter doing chart review, review of test results, interpretation of tests, patient visit and documentation.

## 2022-12-05 ENCOUNTER — ONCOLOGY VISIT (OUTPATIENT)
Dept: ONCOLOGY | Facility: CLINIC | Age: 67
End: 2022-12-05
Attending: INTERNAL MEDICINE
Payer: COMMERCIAL

## 2022-12-05 ENCOUNTER — ANCILLARY PROCEDURE (OUTPATIENT)
Dept: MAMMOGRAPHY | Facility: CLINIC | Age: 67
End: 2022-12-05
Attending: INTERNAL MEDICINE
Payer: COMMERCIAL

## 2022-12-05 ENCOUNTER — ANCILLARY PROCEDURE (OUTPATIENT)
Dept: BONE DENSITY | Facility: CLINIC | Age: 67
End: 2022-12-05
Attending: INTERNAL MEDICINE
Payer: COMMERCIAL

## 2022-12-05 VITALS
WEIGHT: 154.7 LBS | SYSTOLIC BLOOD PRESSURE: 162 MMHG | OXYGEN SATURATION: 100 % | DIASTOLIC BLOOD PRESSURE: 83 MMHG | BODY MASS INDEX: 27.84 KG/M2 | RESPIRATION RATE: 16 BRPM | TEMPERATURE: 98.1 F | HEART RATE: 75 BPM

## 2022-12-05 DIAGNOSIS — Z12.31 VISIT FOR SCREENING MAMMOGRAM: ICD-10-CM

## 2022-12-05 DIAGNOSIS — Z79.811 LONG TERM (CURRENT) USE OF AROMATASE INHIBITORS: ICD-10-CM

## 2022-12-05 DIAGNOSIS — C50.812 MALIGNANT NEOPLASM OF OVERLAPPING SITES OF LEFT BREAST IN FEMALE, ESTROGEN RECEPTOR POSITIVE (H): Primary | ICD-10-CM

## 2022-12-05 DIAGNOSIS — M94.9 DISORDER OF BONE AND CARTILAGE: ICD-10-CM

## 2022-12-05 DIAGNOSIS — L90.5 AXILLARY WEB SYNDROME: ICD-10-CM

## 2022-12-05 DIAGNOSIS — L76.82 AXILLARY WEB SYNDROME: ICD-10-CM

## 2022-12-05 DIAGNOSIS — M89.9 DISORDER OF BONE AND CARTILAGE: ICD-10-CM

## 2022-12-05 DIAGNOSIS — Z17.0 MALIGNANT NEOPLASM OF OVERLAPPING SITES OF LEFT BREAST IN FEMALE, ESTROGEN RECEPTOR POSITIVE (H): Primary | ICD-10-CM

## 2022-12-05 PROCEDURE — G0463 HOSPITAL OUTPT CLINIC VISIT: HCPCS

## 2022-12-05 PROCEDURE — 99214 OFFICE O/P EST MOD 30 MIN: CPT | Performed by: INTERNAL MEDICINE

## 2022-12-05 PROCEDURE — 77063 BREAST TOMOSYNTHESIS BI: CPT | Mod: 52 | Performed by: RADIOLOGY

## 2022-12-05 PROCEDURE — 77080 DXA BONE DENSITY AXIAL: CPT | Performed by: INTERNAL MEDICINE

## 2022-12-05 PROCEDURE — 77067 SCR MAMMO BI INCL CAD: CPT | Mod: 52 | Performed by: RADIOLOGY

## 2022-12-05 ASSESSMENT — PAIN SCALES - GENERAL: PAINLEVEL: NO PAIN (0)

## 2022-12-05 NOTE — NURSING NOTE
"Oncology Rooming Note    December 5, 2022 10:35 AM   Esther Vegas is a 67 year old female who presents for:    Chief Complaint   Patient presents with     Oncology Clinic Visit     RTN for Breast Cancer     Initial Vitals: Blood Pressure (Abnormal) 162/83   Pulse 75   Temperature 98.1  F (36.7  C) (Oral)   Respiration 16   Weight 70.2 kg (154 lb 11.2 oz)   Last Menstrual Period 01/25/2007   Oxygen Saturation 100%   Body Mass Index 27.84 kg/m   Estimated body mass index is 27.84 kg/m  as calculated from the following:    Height as of 11/2/22: 1.588 m (5' 2.5\").    Weight as of this encounter: 70.2 kg (154 lb 11.2 oz). Body surface area is 1.76 meters squared.  No Pain (0) Comment: Data Unavailable   Patient's last menstrual period was 01/25/2007.  Allergies reviewed: Yes  Medications reviewed: Yes    Medications: Medication refills not needed today.  Pharmacy name entered into [facility]:    [id number] [address]  [id number] [address]  [facility] [address]    Clinical concerns: none       Fela Coughlin MA            "

## 2022-12-05 NOTE — LETTER
12/5/2022         RE: Esther Vegas  [address] [facility] [address]        Dear Colleague,    Thank you for referring your patient, Esther Vegas, to the [facility]. Please see a copy of my visit note below.      ONCOLOGY VISIT:    NAME: Esther Vegas       DATE: 12/5/2022    PRIMARY CARE PHYSICIAN: Yana Diaz    PATIENT ID: Grade I, stage IIb, T3N0M0, ER positive, OK positive, HER2 non-amplified invasive lobular carcinoma of the left breast. Oncotype DX recurrence score = 11.     Oncology History: Ms. Vegas is a 67 year old female with a h/o invasive lobular cancer of the left breast. Routine screening mammogram on 10/23/15 demonstrated a spiculated lesion in the upper outer left breast. Ultrasound showed a 0.6 cm mass with indistinct margins at the 1:30 position, 3 cm from the nipple and a second 0.6 cm mass at the 1:00 position, 5 cm from the nipple. Contrast enhanced mammogram then showed non-masslike clumped enhancement in a segmental distribution from the 1 to 3:00 position of the left breast measuring 7.5 cm. Biopsy of the mass at the 1:00 position showed a grade 1 invasive lobular carcinoma and classical type LCIS. Estrogen receptor staining was strongly positive in >95% of tumor cell nuclei. Progesterone receptor staining was moderately positive in 60% of tumor cell nuclei. HER2 was nonamplified by FISH with a HER2 to SAVAGE-17 ratio of 1.2.     Left axillary sentinel lymph node biopsy and left nipple-sparing mastectomy with immediate reconstruction was performed on 12/21/15. Pathology showed invasive lobular carcinoma, [place] grade 2, involving the upper outer quadrant, central breast and lower inner quadrant, size 5.4 cm. There was LCIS, classical type. Surgical margins were negative. A single sentinel LN was negative. [id number]. Oncotype dx recurrence score was 11.  Adjuvant postmastectomy chest wall radiation was not recommended based on lack  of high risk features.  She has been on adjuvant letrozole since 1/13/16.    Interim History:   Ms. Vegas comes into clinic for routine breast cancer follow up.  She is on treatment with letrozole.  She denies significant side effects from the medication.  Her health has been good over the last year.  She states she PCP started her on losartan for hypertension.  She reports a family history of hyperlipidemia and states her PCP has been monitoring this as well.  This past year, she and her spouse went on a tour and [place] that involved [place] 5 [place]  She states in the days after disembarking the boat, she had sensation that the ground was still rocking.  This sensation has since resolved.    She denies breast lumps, pain, or swelling.  She has no cough, shortness of breath, or chest pain.  She has chronic right shoulder pain.  She denies new bone or joint aches or pains.  She is leaving for a trip to [place] with her sisters tomorrow.  The remainder of a complete 12 point review of systems was reviewed with the patient and was negative with the exception of that mentioned above.    PAST MEDICAL HISTORY:   Past Medical History:   Diagnosis Date     BCC (basal cell carcinoma of skin)      Breast cancer (H) 2015    left, invasive lobular     GERD (gastroesophageal reflux disease)      HTN (hypertension) 11/2/2022     Other malignant neoplasm of skin, site unspecified      Tinnitus      Tubular adenoma of colon      Unsatisfactory cervical cytology smear 12/04/2018 12/04/18: See problem list.      Variants of migraine, not elsewhere classified, without mention of intractable migraine without mention of status migrainosus     menstrual migraines       PAST SURGICAL HISTORY:  Past Surgical History:   Procedure Laterality Date     BIOPSY NODE SENTINEL Left 12/21/2015    Procedure: BIOPSY NODE SENTINEL;  Surgeon: Francisco Gomez MD;  Location: [facility]     COLONOSCOPY  01/01/2006     EYE SURGERY   03/2020    Cataract right eye removed     HEMORRHOIDECTOMY  05/01/2006     MASTECTOMY, RECONSTRUCT BREAST, COMBINED Left 12/21/2015    Procedure: COMBINED MASTECTOMY, RECONSTRUCT BREAST;  Surgeon: Francisco Gomez MD;  Location: [facility] OR     MASTOPEXY Right 02/15/2016    Procedure: MASTOPEXY;  Surgeon: RADHA Friedman MD;  Location: [facility]     RECONSTRUCT BREAST Left 12/21/2015    Procedure: RECONSTRUCT BREAST;  Surgeon: Francisco Gomez MD;  Location: [facility] OR     REMOVE AND REPLACE BREAST IMPLANT PROSTHESIS Left 02/15/2016    Procedure: REMOVE AND REPLACE BREAST IMPLANT PROSTHESIS;  Surgeon: RADHA Friedman MD;  Location: [facility] OR       MEDS:  Current Outpatient Medications   Medication     acyclovir (ZOVIRAX) 800 MG tablet     calcium-vitamin D 500-125 MG-UNIT TABS     cetirizine (ZYRTEC) 10 MG tablet     letrozole (FEMARA) 2.5 MG tablet     losartan (COZAAR) 25 MG tablet     No current facility-administered medications for this visit.       ALLERGIES:  Allergies   Allergen Reactions     Bactrim [Sulfamethoxazole W/Trimethoprim]      Facial redness     Codeine      Dizziness and nausea     Vicodin [Hydrocodone-Acetaminophen] Nausea        PHYSICAL EXAM:  BP (!) 162/83   Pulse 75   Temp 98.1  F (36.7  C) (Oral)   Resp 16   Wt 70.2 kg (154 lb 11.2 oz)   LMP 01/25/2007   SpO2 100%   BMI 27.84 kg/m    General:  Well appearing adult female in NAD.  Alert and oriented.  HEENT:  Normocephalic.  Sclera anicteric.    Lymph:  No palpable cervical, supraclavicular, or axillary LAD.  Chest:  CTA bilaterally.  No wheezes or crackles.  CV:  RRR.  Nl S1 and S2.    Breast:  Right breast mastopexy.  Left breast mastectomy with implant reconstruction.  There is axillary cording left side with overlying tightness with extension of the arm.  There are no discretely palpable masses in the right breast or overlying the left reconstructed breast.  Abd:  Soft/ND.   Ext:  No pitting edema of the bilateral lower extremities.     Musculo:  Full ROM of the bilateral upper extremities.  Neuro:  Cranial nerves grossly intact.  Gait is stable.  Psych:  Mood and affect appear normal.    LABS REVIEWED THIS VISIT:  I personally reviewed the images of the following -     12/5/2022 Right breast screening mammogram:  We reviewed the images together in clinic today.  On my view, there is no significant change when compared to prior.    12/5/2022 DEXA bone density scan:  Lowest T-score is in the right femoral neck = -0.7    IMPRESSION/PLAN: 68 yo female with a h/o T3N0M0, grade I, ER positive, IN positive, HER2 negative invasive lobular carcinoma of the left breast. She is s/p treatment with left breast mastectomy and has been on letrozole since 01/13/2016.    1.  Left breast estrogen receptor-positive cancer:  Ms. Vegas is 7 years out from excision of a left breast cancer.  She continues on letrozole.  She is tolerating the medication well.  Plan is to continue letrozole to complete a 10 year course, through 01/2026.      She is asymptomatic of disease recurrence on history taken today.   Right breast screening mammogram was reviewed in clinic today which is without new or concerning finding.  Of note, radiology read is pending and will be released to the patient when available.  Will schedule right breast mammogram and return visit in 1 year.    2.  Hypertension:  White coat hypertension is normal for her.  However, since last visit, her PCP has elected to start her on losartan.  BP is 160s/80s today.  Ongoing follow up with PCP.      3.  Bone health:  She is at increased risk of osteoporosis on letrozole. DEXA bone density score today with a lowest T-score of -0.7 which is within normal range.  Continue calcium 1200 mg and vitamin D 1000 IU daily as well as a half hour of daily weight bearing activity.      4.  Followup:    Right breast screening mammogram and visit with me in 1 year.    30 minutes spent on the date of the encounter doing chart  review, review of test results, interpretation of tests, patient visit and documentation.    Sincerely,        Genevieve Rojas MD

## 2022-12-20 ENCOUNTER — OFFICE VISIT (OUTPATIENT)
Dept: FAMILY MEDICINE | Facility: CLINIC | Age: 67
End: 2022-12-20
Payer: COMMERCIAL

## 2022-12-20 VITALS
SYSTOLIC BLOOD PRESSURE: 158 MMHG | OXYGEN SATURATION: 98 % | DIASTOLIC BLOOD PRESSURE: 98 MMHG | WEIGHT: 155.7 LBS | HEART RATE: 73 BPM | BODY MASS INDEX: 28.02 KG/M2

## 2022-12-20 DIAGNOSIS — Z12.11 SCREEN FOR COLON CANCER: ICD-10-CM

## 2022-12-20 DIAGNOSIS — E78.2 MIXED HYPERLIPIDEMIA: ICD-10-CM

## 2022-12-20 DIAGNOSIS — I10 HYPERTENSION, UNSPECIFIED TYPE: ICD-10-CM

## 2022-12-20 PROCEDURE — 36415 COLL VENOUS BLD VENIPUNCTURE: CPT | Performed by: FAMILY MEDICINE

## 2022-12-20 PROCEDURE — 80048 BASIC METABOLIC PNL TOTAL CA: CPT | Performed by: FAMILY MEDICINE

## 2022-12-20 PROCEDURE — 99214 OFFICE O/P EST MOD 30 MIN: CPT | Mod: 25 | Performed by: FAMILY MEDICINE

## 2022-12-20 RX ORDER — LOSARTAN POTASSIUM 50 MG/1
50 TABLET ORAL DAILY
Qty: 30 TABLET | Refills: 1 | Status: CANCELLED | OUTPATIENT
Start: 2022-12-20

## 2022-12-20 NOTE — ASSESSMENT & PLAN NOTE
Statin decision aid reviewed.   tchol 249, tg 167, hdl 53, ldl 163  10 year cardiovascular risk would be reduced from 17% to 10% with a statin.     She is agreeable.   crestor 5mg po q day ordered.  1 month follow up cmp at that time planned.

## 2022-12-20 NOTE — PROGRESS NOTES
Problem List Items Addressed This Visit        Endocrine    Mixed hyperlipidemia     Statin decision aid reviewed.   tchol 249, tg 167, hdl 53, ldl 163  10 year cardiovascular risk would be reduced from 17% to 10% with a statin.     She is agreeable.   crestor 5mg po q day ordered.  1 month follow up cmp at that time planned.             Circulatory    HTN (hypertension)     Improved bp, still not optimized.   We discussed increasing dose, but she says she was not consistently taking but she has been on vacation and celebrating and eating unusual foods/ drinks. So recommend continue losartan 25 mg po q day for another month  Follow up in 1 month to check bp and bmp.         Relevant Orders    Basic metabolic panel  (Ca, Cl, CO2, Creat, Gluc, K, Na, BUN)   Other Visit Diagnoses     Screen for colon cancer        Relevant Orders    Colonoscopy Screening  Referral         Follow-up Visit   Expected date:  Jan 20, 2023 (Approximate)      Follow Up Appointment Details:     Follow-up with whom?: PCP    Follow-Up for what?: Chronic Disease f/u    Chronic Disease f/u: Hypertension    How?: In Person    Is this an as-needed follow-up?: No                     Subjective   Esther Eagle is a 67 year old who presents for the following health issues   Chief Complaint   Patient presents with     Blood Pressure Check     chol meds      History of Present Illness       Hypertension: She presents for follow up of hypertension.  She does check blood pressure  regularly outside of the clinic. Outpatient blood pressures have not been over 140/90. She does not follow a low salt diet.            Objective    BP (!) 158/98 (BP Location: Left arm, Patient Position: Left side, Cuff Size: Adult Regular)   Pulse 73   Wt 71.7 kg (158 lb)   LMP 01/25/2007   SpO2 98%   BMI 28.44 kg/m    Body mass index is 28.44 kg/m .  Physical Exam  Constitutional:       Appearance: Normal appearance.   HENT:      Head: Normocephalic and  atraumatic.   Cardiovascular:      Rate and Rhythm: Normal rate and regular rhythm.   Pulmonary:      Effort: Pulmonary effort is normal.   Musculoskeletal:         General: Normal range of motion.      Cervical back: Normal range of motion and neck supple.   Neurological:      General: No focal deficit present.      Mental Status: She is alert and oriented to person, place, and time.            This note has been dictated using voice recognition software. Any grammatical or context distortions are unintentional and inherent to the software

## 2022-12-20 NOTE — ASSESSMENT & PLAN NOTE
Improved bp, still not optimized.   We discussed increasing dose, but she says she was not consistently taking but she has been on vacation and celebrating and eating unusual foods/ drinks. So recommend continue losartan 25 mg po q day for another month  Follow up in 1 month to check bp and bmp.

## 2022-12-21 LAB
ANION GAP SERPL CALCULATED.3IONS-SCNC: 18 MMOL/L (ref 7–15)
BUN SERPL-MCNC: 15.8 MG/DL (ref 8–23)
CALCIUM SERPL-MCNC: 9.7 MG/DL (ref 8.8–10.2)
CHLORIDE SERPL-SCNC: 104 MMOL/L (ref 98–107)
CREAT SERPL-MCNC: 0.66 MG/DL (ref 0.51–0.95)
DEPRECATED HCO3 PLAS-SCNC: 20 MMOL/L (ref 22–29)
GFR SERPL CREATININE-BSD FRML MDRD: >90 ML/MIN/1.73M2
GLUCOSE SERPL-MCNC: 90 MG/DL (ref 70–99)
POTASSIUM SERPL-SCNC: 4.1 MMOL/L (ref 3.4–5.3)
SODIUM SERPL-SCNC: 142 MMOL/L (ref 136–145)

## 2023-01-03 ENCOUNTER — MYC MEDICAL ADVICE (OUTPATIENT)
Dept: FAMILY MEDICINE | Facility: CLINIC | Age: 68
End: 2023-01-03

## 2023-01-03 DIAGNOSIS — E78.2 MIXED HYPERLIPIDEMIA: Primary | ICD-10-CM

## 2023-01-04 RX ORDER — ROSUVASTATIN CALCIUM 5 MG/1
5 TABLET, COATED ORAL DAILY
Qty: 90 TABLET | Refills: 0 | Status: SHIPPED | OUTPATIENT
Start: 2023-01-04 | End: 2023-03-26

## 2023-01-26 ENCOUNTER — TRANSFERRED RECORDS (OUTPATIENT)
Dept: HEALTH INFORMATION MANAGEMENT | Facility: CLINIC | Age: 68
End: 2023-01-26
Payer: COMMERCIAL

## 2023-02-03 ENCOUNTER — OFFICE VISIT (OUTPATIENT)
Dept: FAMILY MEDICINE | Facility: CLINIC | Age: 68
End: 2023-02-03
Attending: FAMILY MEDICINE
Payer: COMMERCIAL

## 2023-02-03 VITALS
RESPIRATION RATE: 16 BRPM | DIASTOLIC BLOOD PRESSURE: 84 MMHG | TEMPERATURE: 98.2 F | HEART RATE: 71 BPM | SYSTOLIC BLOOD PRESSURE: 136 MMHG | OXYGEN SATURATION: 98 %

## 2023-02-03 DIAGNOSIS — E78.2 MIXED HYPERLIPIDEMIA: Primary | ICD-10-CM

## 2023-02-03 DIAGNOSIS — Z17.0 MALIGNANT NEOPLASM OF OVERLAPPING SITES OF LEFT BREAST IN FEMALE, ESTROGEN RECEPTOR POSITIVE (H): ICD-10-CM

## 2023-02-03 DIAGNOSIS — I10 HYPERTENSION, UNSPECIFIED TYPE: ICD-10-CM

## 2023-02-03 DIAGNOSIS — Z51.81 ENCOUNTER FOR THERAPEUTIC DRUG MONITORING: ICD-10-CM

## 2023-02-03 DIAGNOSIS — C50.812 MALIGNANT NEOPLASM OF OVERLAPPING SITES OF LEFT BREAST IN FEMALE, ESTROGEN RECEPTOR POSITIVE (H): ICD-10-CM

## 2023-02-03 PROBLEM — K57.30 DIVERTICULAR DISEASE OF LARGE INTESTINE: Status: ACTIVE | Noted: 2023-01-26

## 2023-02-03 PROBLEM — D12.5 BENIGN NEOPLASM OF SIGMOID COLON: Status: ACTIVE | Noted: 2023-01-30

## 2023-02-03 LAB
ALBUMIN SERPL BCG-MCNC: 4.2 G/DL (ref 3.5–5.2)
ALP SERPL-CCNC: 83 U/L (ref 35–104)
ALT SERPL W P-5'-P-CCNC: 23 U/L (ref 10–35)
ANION GAP SERPL CALCULATED.3IONS-SCNC: 11 MMOL/L (ref 7–15)
AST SERPL W P-5'-P-CCNC: 25 U/L (ref 10–35)
BILIRUB SERPL-MCNC: 0.2 MG/DL
BUN SERPL-MCNC: 20.5 MG/DL (ref 8–23)
CALCIUM SERPL-MCNC: 9.8 MG/DL (ref 8.8–10.2)
CHLORIDE SERPL-SCNC: 106 MMOL/L (ref 98–107)
CREAT SERPL-MCNC: 0.87 MG/DL (ref 0.51–0.95)
DEPRECATED HCO3 PLAS-SCNC: 27 MMOL/L (ref 22–29)
GFR SERPL CREATININE-BSD FRML MDRD: 72 ML/MIN/1.73M2
GLUCOSE SERPL-MCNC: 101 MG/DL (ref 70–99)
POTASSIUM SERPL-SCNC: 4.6 MMOL/L (ref 3.4–5.3)
PROT SERPL-MCNC: 7 G/DL (ref 6.4–8.3)
SODIUM SERPL-SCNC: 144 MMOL/L (ref 136–145)

## 2023-02-03 PROCEDURE — 99214 OFFICE O/P EST MOD 30 MIN: CPT | Performed by: FAMILY MEDICINE

## 2023-02-03 PROCEDURE — 80053 COMPREHEN METABOLIC PANEL: CPT | Performed by: FAMILY MEDICINE

## 2023-02-03 PROCEDURE — 36415 COLL VENOUS BLD VENIPUNCTURE: CPT | Performed by: FAMILY MEDICINE

## 2023-02-03 NOTE — ASSESSMENT & PLAN NOTE
Controlled today w bp 136/84 today and she does monitor at home.   Continue losartan 25 mg po q day.

## 2023-02-03 NOTE — PROGRESS NOTES
Problem List Items Addressed This Visit        Endocrine    Mixed hyperlipidemia - Primary     Patient has been on 1 month of Crestor.  Plan to recheck liver function tests and lipids.  Both orders are placed but she is not fasting today so she will return when she is fasting for the lipid panel.         Relevant Orders    Lipid Profile       Circulatory    HTN (hypertension)     Controlled today w bp 136/84 today and she does monitor at home.   Continue losartan 25 mg po q day.          Relevant Orders    Comprehensive metabolic panel (BMP + Alb, Alk Phos, ALT, AST, Total. Bili, TP)       Other    Malignant neoplasm of left breast (H)     Managed by oncologist Dr. Rojas        Other Visit Diagnoses     Encounter for therapeutic drug monitoring                 Subjective   Esther Eagle is a 67 year old who presents for the following health issues   Chief Complaint   Patient presents with     Follow Up     BP & labs      History of Present Illness       She eats 4 or more servings of fruits and vegetables daily.She consumes 0 sweetened beverage(s) daily.She exercises with enough effort to increase her heart rate 30 to 60 minutes per day.  She exercises with enough effort to increase her heart rate 7 days per week.   She is taking medications regularly.           Objective    /84 (BP Location: Left arm, Patient Position: Sitting, Cuff Size: Adult Large)   Pulse 71   Temp 98.2  F (36.8  C) (Oral)   Resp 16   LMP 01/25/2007   SpO2 98%   There is no height or weight on file to calculate BMI.  Physical Exam  Constitutional:       Appearance: Normal appearance.   HENT:      Head: Normocephalic and atraumatic.   Cardiovascular:      Rate and Rhythm: Normal rate and regular rhythm.   Pulmonary:      Effort: Pulmonary effort is normal.   Musculoskeletal:         General: Normal range of motion.      Cervical back: Normal range of motion and neck supple.   Neurological:      General: No focal deficit present.       Mental Status: She is alert and oriented to person, place, and time.          This note has been dictated using voice recognition software. Any grammatical or context distortions are unintentional and inherent to the software

## 2023-02-03 NOTE — ASSESSMENT & PLAN NOTE
Patient has been on 1 month of Crestor.  Plan to recheck liver function tests and lipids.  Both orders are placed but she is not fasting today so she will return when she is fasting for the lipid panel.

## 2023-03-05 DIAGNOSIS — E78.2 MIXED HYPERLIPIDEMIA: ICD-10-CM

## 2023-03-06 RX ORDER — ROSUVASTATIN CALCIUM 5 MG/1
TABLET, COATED ORAL
Qty: 90 TABLET | Refills: 0 | OUTPATIENT
Start: 2023-03-06

## 2023-03-06 NOTE — TELEPHONE ENCOUNTER
Patient is due for follow-up lipid panel. Assisted with scheduling fasting lab appointment for tomorrow 3/7/23.   Refusing refill at this time until labs result and PCP can review.

## 2023-03-07 ENCOUNTER — LAB (OUTPATIENT)
Dept: LAB | Facility: CLINIC | Age: 68
End: 2023-03-07
Payer: COMMERCIAL

## 2023-03-07 DIAGNOSIS — E78.2 MIXED HYPERLIPIDEMIA: ICD-10-CM

## 2023-03-07 LAB
CHOLEST SERPL-MCNC: 217 MG/DL
HDLC SERPL-MCNC: 60 MG/DL
LDLC SERPL CALC-MCNC: 130 MG/DL
NONHDLC SERPL-MCNC: 157 MG/DL
TRIGL SERPL-MCNC: 133 MG/DL

## 2023-03-07 PROCEDURE — 36415 COLL VENOUS BLD VENIPUNCTURE: CPT

## 2023-03-07 PROCEDURE — 80061 LIPID PANEL: CPT

## 2023-03-25 DIAGNOSIS — E78.2 MIXED HYPERLIPIDEMIA: ICD-10-CM

## 2023-03-26 RX ORDER — ROSUVASTATIN CALCIUM 5 MG/1
TABLET, COATED ORAL
Qty: 90 TABLET | Refills: 0 | Status: SHIPPED | OUTPATIENT
Start: 2023-03-26 | End: 2023-06-22

## 2023-03-26 NOTE — TELEPHONE ENCOUNTER
"Last Written Prescription Date:  1/4/2023  Last Fill Quantity: 90,  # refills: 0   Last office visit provider:  2/3/2023     Requested Prescriptions   Pending Prescriptions Disp Refills     rosuvastatin (CRESTOR) 5 MG tablet [Pharmacy Med Name: ROSUVASTATIN CALCIUM 5 MG TAB] 90 tablet 0     Sig: TAKE 1 TABLET BY MOUTH EVERY DAY       Statins Protocol Passed - 3/25/2023  4:18 PM        Passed - LDL on file in past 12 months     Recent Labs   Lab Test 03/07/23  0809   *             Passed - No abnormal creatine kinase in past 12 months     No lab results found.             Passed - Recent (12 mo) or future (30 days) visit within the authorizing provider's specialty     Patient has had an office visit with the authorizing provider or a provider within the authorizing providers department within the previous 12 mos or has a future within next 30 days. See \"Patient Info\" tab in inbasket, or \"Choose Columns\" in Meds & Orders section of the refill encounter.              Passed - Medication is active on med list        Passed - Patient is age 18 or older        Passed - No active pregnancy on record        Passed - No positive pregnancy test in past 12 months             KENY HERNÁNDEZ RN 03/26/23 10:43 AM  "

## 2023-06-14 ENCOUNTER — OFFICE VISIT (OUTPATIENT)
Dept: FAMILY MEDICINE | Facility: CLINIC | Age: 68
End: 2023-06-14
Payer: COMMERCIAL

## 2023-06-14 VITALS
RESPIRATION RATE: 16 BRPM | BODY MASS INDEX: 28.08 KG/M2 | OXYGEN SATURATION: 99 % | SYSTOLIC BLOOD PRESSURE: 186 MMHG | WEIGHT: 156 LBS | TEMPERATURE: 98.9 F | DIASTOLIC BLOOD PRESSURE: 96 MMHG | HEART RATE: 98 BPM

## 2023-06-14 DIAGNOSIS — N30.01 ACUTE CYSTITIS WITH HEMATURIA: Primary | ICD-10-CM

## 2023-06-14 LAB
ALBUMIN UR-MCNC: >=300 MG/DL
APPEARANCE UR: ABNORMAL
BACTERIA #/AREA URNS HPF: ABNORMAL /HPF
BILIRUB UR QL STRIP: ABNORMAL
COLOR UR AUTO: ABNORMAL
GLUCOSE UR STRIP-MCNC: NEGATIVE MG/DL
HGB UR QL STRIP: ABNORMAL
KETONES UR STRIP-MCNC: NEGATIVE MG/DL
LEUKOCYTE ESTERASE UR QL STRIP: ABNORMAL
NITRATE UR QL: NEGATIVE
PH UR STRIP: 7 [PH] (ref 5–8)
RBC #/AREA URNS AUTO: >100 /HPF
SP GR UR STRIP: 1.01 (ref 1–1.03)
SQUAMOUS #/AREA URNS AUTO: ABNORMAL /LPF
UROBILINOGEN UR STRIP-ACNC: 0.2 E.U./DL
WBC #/AREA URNS AUTO: >100 /HPF
WBC CLUMPS #/AREA URNS HPF: PRESENT /HPF

## 2023-06-14 PROCEDURE — 87086 URINE CULTURE/COLONY COUNT: CPT | Performed by: PHYSICIAN ASSISTANT

## 2023-06-14 PROCEDURE — 81001 URINALYSIS AUTO W/SCOPE: CPT | Performed by: PHYSICIAN ASSISTANT

## 2023-06-14 PROCEDURE — 99214 OFFICE O/P EST MOD 30 MIN: CPT | Performed by: PHYSICIAN ASSISTANT

## 2023-06-14 PROCEDURE — 87186 SC STD MICRODIL/AGAR DIL: CPT | Performed by: PHYSICIAN ASSISTANT

## 2023-06-14 RX ORDER — CEFDINIR 300 MG/1
300 CAPSULE ORAL 2 TIMES DAILY
Qty: 20 CAPSULE | Refills: 0 | Status: SHIPPED | OUTPATIENT
Start: 2023-06-14 | End: 2023-06-28

## 2023-06-14 RX ORDER — CEFDINIR 300 MG/1
300 CAPSULE ORAL 2 TIMES DAILY
Qty: 20 CAPSULE | Refills: 0 | Status: SHIPPED | OUTPATIENT
Start: 2023-06-14 | End: 2023-06-14

## 2023-06-15 NOTE — PROGRESS NOTES
Patient presents with:  Kidney Problem: Blood in urine and discomfort  started today       Clinical Decision Making:  Multiple etiologies and diagnoses were considered to include but not limited to urinary tract infection, dysuria, hyperglycemia.  Patient is treated for a long course of treatment based on her age and other inclusion criteria.  Patient is treated with antibiotic and creatinine clearance and allergies were reviewed and expected course of resolution and indication for return was gone over.        ICD-10-CM    1. Acute cystitis with hematuria  N30.01 UA Macroscopic with reflex to Microscopic and Culture     Urine Microscopic Exam     Urine Culture     cefdinir (OMNICEF) 300 MG capsule     DISCONTINUED: cefdinir (OMNICEF) 300 MG capsule          Patient Instructions   Increased fluids and rest.  Discussed signs and symptoms of ascending urinary tract infection symptoms to include pyelonephritis. Instructed to turn to clinic if there are increased fever chills night sweats fatigue abdominal pain or flank pain  Antibiotic as written.         Urinary Tract Infections in Women    Urinary tract infections (UTIs) are most often caused by bacteria (germs). These bacteria enter the urinary tract. The bacteria may come from outside the body. Or they may travel from the skin outside the rectum or vagina into the urethra. Female anatomy makes it easier for bacteria from the bowel to enter a woman s urinary tract, which is the most common source of UTI. This means women develop UTIs more often than men. Pain in or around the urinary tract is a common UTI symptom. But the only way to know for sure if you have a UTI for the health care provider to test your urine. The two tests that may be done are the urinalysis and urine culture.  Types of UTIs    Cystitis: A bladder infection (cystitis) is the most common UTI in women. You may have urgent or frequent urination. You may also have pain, burning when you urinate, and  bloody urine.    Urethritis: This is an inflamed urethra, which is the tube that carries urine from the bladder to outside the body. You may have lower stomach or back pain. You may also have urgent or frequent urination.    Pyelonephritis: This is a kidney infection. If not treated, it can be serious and damage your kidneys. In severe cases, you may be hospitalized. You may have a fever and lower back pain.  Medications to treat a UTI  Most UTIs are treated with antibiotics. These kill the bacteria. The length of time you need to take them depends on the type of infection. It may be as short as 3 days. If you have repeated UTIs, a low-dose antibiotic may be needed for several months. Take antibiotics exactly as directed. Don t stop taking them until all of the medication is gone. If you stop taking the antibiotic too soon, the infection may not go away, and you may develop a resistance to the antibiotic. This can make it much harder to treat.  Lifestyle changes to treat and prevent UTIs  The lifestyle changes below will help get rid of your UTI. They may also help prevent future UTIs.    Drink plenty of fluids. This includes water, juice, or other caffeine-free drinks. Fluids help flush bacteria out of your body.    Empty your bladder. Always empty your bladder when you feel the urge to urinate. And always urinate before going to sleep. Urine that stays in your bladder can lead to infection. Try to urinate before and after sex as well.    Practice good personal hygiene. Wipe yourself from front to back after using the toilet. This helps keep bacteria from getting into the urethra.    Use condoms during sex. These help prevent UTIs caused by sexually transmitted bacteria. Also, avoid using spermicides during sex. These can increase the risk of UTIs. Choose other forms of birth control instead. For women who tend to get UTIs after sex, a low-dose of a preventive antibiotic may be used. Be sure to discuss this option  with your health care provider.    Follow up with your health care provider as directed. He or she may test to make sure the infection has cleared. If necessary, additional treatment may be started.  Date Last Reviewed: 9/8/2014 2000-2016 [employer]. [address] [place] [address]. All rights reserved. This information is not intended as a substitute for professional medical care. Always follow your healthcare professional's instructions.                       HPI:  Esther Vegas is a 67 year old female who presents today for a one day history of a one day history of irritative voiding symptoms to include urinary hesitancy urgency frequency and dysuria.  Patient denies gross hematuria.  Denies fever chills night sweats fatigue or other red flag symptoms to include back or flank pain and no vaginal discharge.      Problem List:  2023-01: Benign neoplasm of sigmoid colon  2023-01: Diverticular disease of large intestine  2022-11: HTN (hypertension)  2022-11: Mixed hyperlipidemia  2022-11: Overweight with body mass index (BMI) of 27 to 27.9 in adult  2022-11: Encounter for preventive care  2018-12: Unsatisfactory cervical cytology smear  2015-12: S/P breast reconstruction, left  2015-12: HX: breast cancer  2015-11: Malignant neoplasm of left breast (H)  2011-11: Vaginal atrophy  2010-10: CARDIOVASCULAR SCREENING; LDL GOAL LESS THAN 160  Migraine variant  Other malignant neoplasm of skin, site unspecified      Past Medical History:   Diagnosis Date     BCC (basal cell carcinoma of skin)      Breast cancer (H) 2015    left, invasive lobular     GERD (gastroesophageal reflux disease)      HTN (hypertension) 11/2/2022     Other malignant neoplasm of skin, site unspecified      Tinnitus      Tubular adenoma of colon      Unsatisfactory cervical cytology smear 12/04/2018 12/04/18: See problem list.      Variants of migraine, not elsewhere classified, without mention of intractable migraine  without mention of status migrainosus     menstrual migraines       Social History     Tobacco Use     Smoking status: Never     Smokeless tobacco: Never   Vaping Use     Vaping status: Never Used   Substance Use Topics     Alcohol use: Yes     Alcohol/week: 2.0 - 3.0 standard drinks of alcohol     Types: 2 - 3 Standard drinks or equivalent per week     Comment: 5 times weekly       Review of Systems  As above in HPI otherwise negative.    Vitals:    06/14/23 1937   BP: (!) 186/96   BP Location: Right arm   Patient Position: Sitting   Cuff Size: Adult Regular   Pulse: 98   Resp: 16   Temp: 98.9  F (37.2  C)   TempSrc: Tympanic   SpO2: 99%   Weight: 70.8 kg (156 lb)       General: Patient is resting comfortably no acute distress is afebrile  HEENT: Head is normocephalic atraumatic   Abdomen: No CVA tenderness to percussion  No suprapubic tenderness to palpation.  Skin: Without rash non-diaphoretic    Physical Exam      Labs:  Results for orders placed or performed in visit on 06/14/23   UA Macroscopic with reflex to Microscopic and Culture     Status: Abnormal    Specimen: Urine, Clean Catch   Result Value Ref Range    Color Urine Red (A) Colorless, Straw, Light Yellow, Yellow    Appearance Urine Turbid (A) Clear    Glucose Urine Negative Negative mg/dL    Bilirubin Urine Small (A) Negative    Ketones Urine Negative Negative mg/dL    Specific Gravity Urine 1.010 1.005 - 1.030    Blood Urine Large (A) Negative    pH Urine 7.0 5.0 - 8.0    Protein Albumin Urine >=300 (A) Negative mg/dL    Urobilinogen Urine 0.2 0.2, 1.0 E.U./dL    Nitrite Urine Negative Negative    Leukocyte Esterase Urine Large (A) Negative   Urine Microscopic Exam     Status: Abnormal   Result Value Ref Range    Bacteria Urine Many (A) None Seen /HPF    RBC Urine >100 (A) 0-2 /HPF /HPF    WBC Urine >100 (A) 0-5 /HPF /HPF    Squamous Epithelials Urine Few (A) None Seen /LPF    WBC Clumps Urine Present (A) None Seen /HPF    Narrative    Multiple  visible small clots in specimen       At the end of the encounter, I discussed results, diagnosis, medications. Discussed red flags for immediate return to clinic/ER, as well as indications for follow up if no improvement. Patient understood and agreed to plan. Patient was stable for discharge.

## 2023-06-15 NOTE — PATIENT INSTRUCTIONS
Increased fluids and rest.  Discussed signs and symptoms of ascending urinary tract infection symptoms to include pyelonephritis. Instructed to turn to clinic if there are increased fever chills night sweats fatigue abdominal pain or flank pain  Antibiotic as written.         Urinary Tract Infections in Women    Urinary tract infections (UTIs) are most often caused by bacteria (germs). These bacteria enter the urinary tract. The bacteria may come from outside the body. Or they may travel from the skin outside the rectum or vagina into the urethra. Female anatomy makes it easier for bacteria from the bowel to enter a woman s urinary tract, which is the most common source of UTI. This means women develop UTIs more often than men. Pain in or around the urinary tract is a common UTI symptom. But the only way to know for sure if you have a UTI for the health care provider to test your urine. The two tests that may be done are the urinalysis and urine culture.  Types of UTIs  Cystitis: A bladder infection (cystitis) is the most common UTI in women. You may have urgent or frequent urination. You may also have pain, burning when you urinate, and bloody urine.  Urethritis: This is an inflamed urethra, which is the tube that carries urine from the bladder to outside the body. You may have lower stomach or back pain. You may also have urgent or frequent urination.  Pyelonephritis: This is a kidney infection. If not treated, it can be serious and damage your kidneys. In severe cases, you may be hospitalized. You may have a fever and lower back pain.  Medications to treat a UTI  Most UTIs are treated with antibiotics. These kill the bacteria. The length of time you need to take them depends on the type of infection. It may be as short as 3 days. If you have repeated UTIs, a low-dose antibiotic may be needed for several months. Take antibiotics exactly as directed. Don t stop taking them until all of the medication is gone. If  you stop taking the antibiotic too soon, the infection may not go away, and you may develop a resistance to the antibiotic. This can make it much harder to treat.  Lifestyle changes to treat and prevent UTIs  The lifestyle changes below will help get rid of your UTI. They may also help prevent future UTIs.  Drink plenty of fluids. This includes water, juice, or other caffeine-free drinks. Fluids help flush bacteria out of your body.  Empty your bladder. Always empty your bladder when you feel the urge to urinate. And always urinate before going to sleep. Urine that stays in your bladder can lead to infection. Try to urinate before and after sex as well.  Practice good personal hygiene. Wipe yourself from front to back after using the toilet. This helps keep bacteria from getting into the urethra.  Use condoms during sex. These help prevent UTIs caused by sexually transmitted bacteria. Also, avoid using spermicides during sex. These can increase the risk of UTIs. Choose other forms of birth control instead. For women who tend to get UTIs after sex, a low-dose of a preventive antibiotic may be used. Be sure to discuss this option with your health care provider.  Follow up with your health care provider as directed. He or she may test to make sure the infection has cleared. If necessary, additional treatment may be started.  Date Last Reviewed: 9/8/2014 2000-2016 [employer]. [address] [place] [address], [place] [address]. All rights reserved. This information is not intended as a substitute for professional medical care. Always follow your healthcare professional's instructions.

## 2023-06-16 LAB — BACTERIA UR CULT: ABNORMAL

## 2023-06-22 DIAGNOSIS — E78.2 MIXED HYPERLIPIDEMIA: ICD-10-CM

## 2023-06-22 RX ORDER — ROSUVASTATIN CALCIUM 5 MG/1
TABLET, COATED ORAL
Qty: 90 TABLET | Refills: 2 | Status: SHIPPED | OUTPATIENT
Start: 2023-06-22 | End: 2024-02-21

## 2023-06-22 NOTE — TELEPHONE ENCOUNTER
"Last Written Prescription Date:  3/26/23  Last Fill Quantity: 90 # refills: 0  Last office visit provider:  2/3/23    Requested Prescriptions   Pending Prescriptions Disp Refills     rosuvastatin (CRESTOR) 5 MG tablet [Pharmacy Med Name: ROSUVASTATIN CALCIUM 5 MG TAB] 90 tablet 0     Sig: TAKE 1 TABLET BY MOUTH EVERY DAY       Statins Protocol Passed - 6/22/2023  3:28 PM        Passed - LDL on file in past 12 months     Recent Labs   Lab Test 03/07/23  0809   *             Passed - No abnormal creatine kinase in past 12 months     No lab results found.             Passed - Recent (12 mo) or future (30 days) visit within the authorizing provider's specialty     Patient has had an office visit with the authorizing provider or a provider within the authorizing providers department within the previous 12 mos or has a future within next 30 days. See \"Patient Info\" tab in inbasket, or \"Choose Columns\" in Meds & Orders section of the refill encounter.              Passed - Medication is active on med list        Passed - Patient is age 18 or older        Passed - No active pregnancy on record        Passed - No positive pregnancy test in past 12 months             Serina Guzman RN 06/22/23 3:56 PM  "

## 2023-06-28 ENCOUNTER — TELEPHONE (OUTPATIENT)
Dept: FAMILY MEDICINE | Facility: CLINIC | Age: 68
End: 2023-06-28

## 2023-06-28 ENCOUNTER — E-VISIT (OUTPATIENT)
Dept: FAMILY MEDICINE | Facility: CLINIC | Age: 68
End: 2023-06-28
Payer: COMMERCIAL

## 2023-06-28 DIAGNOSIS — S80.869A TICK BITE OF LOWER LEG, UNSPECIFIED LATERALITY, INITIAL ENCOUNTER: Primary | ICD-10-CM
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
## 2023-06-28 PROCEDURE — 99421 OL DIG E/M SVC 5-10 MIN: CPT | Performed by: FAMILY MEDICINE

## 2023-06-28 NOTE — TELEPHONE ENCOUNTER
Symptoms    Describe your symptoms: had 2 tick bite and for sure one tick was a deer tick.  Esther Eagle is seeking medication.    Any pain: No    How long have you been having symptoms: No symptoms.    Have you been seen for this:  No    Appointment offered?: Yes: Encouraged patient to complete an eVisit and ssend the eVisit to Dr Oropeza    Triage offered?: No    Home remedies tried: pulled the ticks off and they are in a jar    Preferred Pharmacy:   [id number] - [place], [place] - [address]  [address] [place] [address] E  [facility] [id number]  Phone: [phone] Fax: [phone]    [id number] - [place] [address] AT [facility] [address]  [address]  [facility] [address]  Phone: [phone] Fax: [phone]      Could we send this information to you in [employer] or would you prefer to receive a phone call?:   No preference   Okay to leave a detailed message?: Yes at Home number on file [phone] (home)    Routing to Dr Oropeza as FYI to the eVisit patient will be completing

## 2023-07-03 ENCOUNTER — TELEPHONE (OUTPATIENT)
Dept: FAMILY MEDICINE | Facility: CLINIC | Age: 68
End: 2023-07-03
Payer: COMMERCIAL

## 2023-07-03 NOTE — TELEPHONE ENCOUNTER
Patient Quality Outreach    Patient is due for the following:   Hypertension -  Hypertension follow-up visit    Next Steps:   Unable to get a hold of patient, left voicemail    Type of outreach:    Phone, left message for patient/parent to call back.    Next Steps:  Reach out within 90 days via Phone.    Max number of attempts reached: No. Will try again in 90 days if patient still on fail list.    Questions for provider review:    None           Tian Gannon MA  Chart routed to Self.

## 2023-07-03 NOTE — LETTER
July 28, 2023      Esther Vegas  [address]  [place] [address]        Dear Esther,     We have been trying to reach you to schedule an appointment for a follow up on your blood pressure.  We were unable to get a hold of you.  Please call the clinic at [phone] to schedule an appointment or via [employer].  If you have any questions, please call or send us a [employer] message.       Sincerely,        Trinity Oropeza MD

## 2023-07-13 NOTE — TELEPHONE ENCOUNTER
Patient Quality Outreach    Patient is due for the following:   Hypertension -  Hypertension follow-up visit    Next Steps:   2nd attempt, unable to reach pt, left VM    Type of outreach:    Phone, left message for patient/parent to call back.    Next Steps:  Reach out within 90 days via Phone.    Max number of attempts reached: No. Will try again in 90 days if patient still on fail list.    Questions for provider review:    None           Tian Gannon MA  Chart routed to Self.

## 2023-07-16 DIAGNOSIS — Z17.0 MALIGNANT NEOPLASM OF OVERLAPPING SITES OF LEFT BREAST IN FEMALE, ESTROGEN RECEPTOR POSITIVE (H): ICD-10-CM

## 2023-07-16 DIAGNOSIS — C50.812 MALIGNANT NEOPLASM OF OVERLAPPING SITES OF LEFT BREAST IN FEMALE, ESTROGEN RECEPTOR POSITIVE (H): ICD-10-CM

## 2023-07-17 RX ORDER — LETROZOLE 2.5 MG/1
TABLET, FILM COATED ORAL
Qty: 90 TABLET | Refills: 3 | Status: SHIPPED | OUTPATIENT
Start: 2023-07-17 | End: 2024-07-15

## 2023-07-17 NOTE — TELEPHONE ENCOUNTER
Medication: Letrozole 2.5 mg tablet    Last prescribing provider:  07/18/22    Last clinic visit date: 12/08/22     Any missed appointments or no-shows since last clinic visit?: No    Recommendations for requested medication (if none, N/A): Copied from last OV note: She continues on letrozole.  She is tolerating the medication well.  Plan is to continue letrozole to complete a 10 year course, through 01/2026.      Next clinic visit date: 12/11/23     Any other pertinent information (if none, N/A): N/A    Pended and Routed to Provider.

## 2023-07-28 NOTE — TELEPHONE ENCOUNTER
Patient Quality Outreach    Patient is due for the following:   Hypertension -  Hypertension follow-up visit    Next Steps:   3rd attempted,     Type of outreach:    Sent letter.    Next Steps:  Reach out within 90 days via Letter.    Max number of attempts reached: Yes. Will try again in 90 days if patient still on fail list.    Questions for provider review:    None           Tian Gannon MA  Chart routed to Self.

## 2023-08-08 ENCOUNTER — OFFICE VISIT (OUTPATIENT)
Dept: FAMILY MEDICINE | Facility: CLINIC | Age: 68
End: 2023-08-08
Payer: COMMERCIAL

## 2023-08-08 VITALS
SYSTOLIC BLOOD PRESSURE: 130 MMHG | OXYGEN SATURATION: 98 % | BODY MASS INDEX: 27.36 KG/M2 | DIASTOLIC BLOOD PRESSURE: 80 MMHG | WEIGHT: 152 LBS | HEART RATE: 78 BPM

## 2023-08-08 DIAGNOSIS — I10 HYPERTENSION, UNSPECIFIED TYPE: Primary | ICD-10-CM

## 2023-08-08 DIAGNOSIS — Z23 NEED FOR SHINGLES VACCINE: ICD-10-CM

## 2023-08-08 PROCEDURE — 99213 OFFICE O/P EST LOW 20 MIN: CPT | Performed by: FAMILY MEDICINE

## 2023-08-08 RX ORDER — LOSARTAN POTASSIUM 25 MG/1
25 TABLET ORAL DAILY
Qty: 90 TABLET | Refills: 3 | Status: SHIPPED | OUTPATIENT
Start: 2023-08-08 | End: 2024-02-21

## 2023-08-08 NOTE — PROGRESS NOTES
"  Assessment & Plan   Problem List Items Addressed This Visit          Circulatory    HTN (hypertension) - Primary     Hypertension controlled  Currently on cozaar 25 mg po q day.   Sometimes at home she gets lightheaded (noted when dehydrated in the sun working) but not often, so would avoid increasing cozaar today         Relevant Medications    losartan (COZAAR) 25 MG tablet     Other Visit Diagnoses       Need for shingles vaccine                    BMI:   Estimated body mass index is 27.36 kg/m  as calculated from the following:    Height as of 11/2/22: 1.588 m (5' 2.5\").    Weight as of this encounter: 68.9 kg (152 lb).         Trinity Oropeza MD  [facility]   Esther Eagle is a 67 year old, presenting for the following health issues:  Blood Pressure Check        8/8/2023     3:58 PM   Additional Questions   Roomed by as   Accompanied by self         8/8/2023     3:58 PM   Patient Reported Additional Medications   Patient reports taking the following new medications no       History of Present Illness       Hypertension: She presents for follow up of hypertension.  She does check blood pressure  regularly outside of the clinic. Outpatient blood pressures have not been over 140/90. She does not follow a low salt diet.     She eats 4 or more servings of fruits and vegetables daily.She consumes 0 sweetened beverage(s) daily.She exercises with enough effort to increase her heart rate 30 to 60 minutes per day.  She exercises with enough effort to increase her heart rate 5 days per week.   She is taking medications regularly.           Objective    /80 (BP Location: Left arm, Patient Position: Left side, Cuff Size: Adult Large)   Pulse 78   Wt 68.9 kg (152 lb)   LMP 01/25/2007   SpO2 98%   BMI 27.36 kg/m    Body mass index is 27.36 kg/m .  Physical Exam  Constitutional:       Appearance: Normal appearance.   HENT:      Head: Normocephalic and atraumatic. "   Cardiovascular:      Rate and Rhythm: Normal rate and regular rhythm.   Pulmonary:      Effort: Pulmonary effort is normal.   Musculoskeletal:         General: Normal range of motion.      Cervical back: Normal range of motion and neck supple.   Neurological:      General: No focal deficit present.      Mental Status: She is alert and oriented to person, place, and time.

## 2023-08-08 NOTE — ASSESSMENT & PLAN NOTE
Hypertension controlled  Currently on cozaar 25 mg po q day.   Sometimes at home she gets lightheaded (noted when dehydrated in the sun working) but not often, so would avoid increasing cozaar today

## 2023-10-03 ENCOUNTER — PATIENT OUTREACH (OUTPATIENT)
Dept: CARE COORDINATION | Facility: CLINIC | Age: 68
End: 2023-10-03
Payer: COMMERCIAL

## 2023-10-17 ENCOUNTER — PATIENT OUTREACH (OUTPATIENT)
Dept: CARE COORDINATION | Facility: CLINIC | Age: 68
End: 2023-10-17
Payer: COMMERCIAL

## 2023-11-28 ENCOUNTER — PATIENT OUTREACH (OUTPATIENT)
Dept: GASTROENTEROLOGY | Facility: CLINIC | Age: 68
End: 2023-11-28
Payer: COMMERCIAL

## 2023-12-11 ENCOUNTER — ANCILLARY PROCEDURE (OUTPATIENT)
Dept: MAMMOGRAPHY | Facility: CLINIC | Age: 68
End: 2023-12-11
Payer: COMMERCIAL

## 2023-12-11 ENCOUNTER — ONCOLOGY VISIT (OUTPATIENT)
Dept: ONCOLOGY | Facility: CLINIC | Age: 68
End: 2023-12-11
Attending: INTERNAL MEDICINE
Payer: COMMERCIAL

## 2023-12-11 VITALS
OXYGEN SATURATION: 98 % | SYSTOLIC BLOOD PRESSURE: 157 MMHG | HEART RATE: 74 BPM | WEIGHT: 157.2 LBS | BODY MASS INDEX: 28.29 KG/M2 | TEMPERATURE: 98.1 F | RESPIRATION RATE: 16 BRPM | DIASTOLIC BLOOD PRESSURE: 76 MMHG

## 2023-12-11 DIAGNOSIS — Z12.31 VISIT FOR SCREENING MAMMOGRAM: ICD-10-CM

## 2023-12-11 DIAGNOSIS — M25.552 HIP PAIN, LEFT: ICD-10-CM

## 2023-12-11 DIAGNOSIS — C50.812 MALIGNANT NEOPLASM OF OVERLAPPING SITES OF LEFT BREAST IN FEMALE, ESTROGEN RECEPTOR POSITIVE (H): Primary | ICD-10-CM

## 2023-12-11 DIAGNOSIS — Z17.0 MALIGNANT NEOPLASM OF OVERLAPPING SITES OF LEFT BREAST IN FEMALE, ESTROGEN RECEPTOR POSITIVE (H): Primary | ICD-10-CM

## 2023-12-11 PROCEDURE — G0463 HOSPITAL OUTPT CLINIC VISIT: HCPCS | Performed by: INTERNAL MEDICINE

## 2023-12-11 PROCEDURE — 77067 SCR MAMMO BI INCL CAD: CPT | Mod: 52 | Performed by: RADIOLOGY

## 2023-12-11 PROCEDURE — 99214 OFFICE O/P EST MOD 30 MIN: CPT | Mod: GC | Performed by: INTERNAL MEDICINE

## 2023-12-11 PROCEDURE — 77063 BREAST TOMOSYNTHESIS BI: CPT | Mod: 52 | Performed by: RADIOLOGY

## 2023-12-11 ASSESSMENT — PAIN SCALES - GENERAL: PAINLEVEL: NO PAIN (0)

## 2023-12-11 NOTE — NURSING NOTE
"Oncology Rooming Note    December 11, 2023 9:23 AM   Esther Vegas is a 68 year old female who presents for:    Chief Complaint   Patient presents with    Oncology Clinic Visit     Malignant neoplasm of left breast     Initial Vitals: BP (!) 157/76 (BP Location: Right arm, Patient Position: Sitting, Cuff Size: Adult Regular)   Pulse 74   Temp 98.1  F (36.7  C) (Oral)   Resp 16   Wt 71.3 kg (157 lb 3.2 oz)   LMP 01/25/2007   SpO2 98%   BMI 28.29 kg/m   Estimated body mass index is 28.29 kg/m  as calculated from the following:    Height as of 11/2/22: 1.588 m (5' 2.5\").    Weight as of this encounter: 71.3 kg (157 lb 3.2 oz). Body surface area is 1.77 meters squared.  No Pain (0) Comment: Data Unavailable   Patient's last menstrual period was 01/25/2007.  Allergies reviewed: Yes  Medications reviewed: Yes    Medications: Medication refills not needed today.  Pharmacy name entered into [employer]:    [id number] [address] [id number] - [address] AT [facility] [address]    Clinical concerns: none      Radha Diaz, EMT  12/11/2023              "

## 2023-12-11 NOTE — LETTER
12/11/2023         RE: Esther Vegas  [address] [facility] [address]        Dear Colleague,    Thank you for referring your patient, Esther Vegas, to the [facility]. Please see a copy of my visit note below.      ONCOLOGY VISIT:    NAME: Esther Vegas       DATE: 12/11/2023    PRIMARY CARE PHYSICIAN: Yana Diaz    PATIENT ID: Grade I, stage IIb, T3N0M0, ER positive, IL positive, HER2 non-amplified invasive lobular carcinoma of the left breast. Oncotype DX recurrence score = 11.     Oncology History: Ms. Vegas is a 68 year old female with a h/o invasive lobular cancer of the left breast. Routine screening mammogram on 10/23/15 demonstrated a spiculated lesion in the upper outer left breast. Ultrasound showed a 0.6 cm mass with indistinct margins at the 1:30 position, 3 cm from the nipple and a second 0.6 cm mass at the 1:00 position, 5 cm from the nipple. Contrast enhanced mammogram then showed non-masslike clumped enhancement in a segmental distribution from the 1 to 3:00 position of the left breast measuring 7.5 cm. Biopsy of the mass at the 1:00 position showed a grade 1 invasive lobular carcinoma and classical type LCIS. Estrogen receptor staining was strongly positive in >95% of tumor cell nuclei. Progesterone receptor staining was moderately positive in 60% of tumor cell nuclei. HER2 was nonamplified by FISH with a HER2 to SAVAGE-17 ratio of 1.2.     Left axillary sentinel lymph node biopsy and left nipple-sparing mastectomy with immediate reconstruction was performed on 12/21/15. Pathology showed invasive lobular carcinoma, [place] grade 2, involving the upper outer quadrant, central breast and lower inner quadrant, size 5.4 cm. There was LCIS, classical type. Surgical margins were negative. A single sentinel LN was negative. [id number]. Oncotype dx recurrence score was 11.  Adjuvant postmastectomy chest wall radiation was not recommended based on  lack of high risk features.  She has been on adjuvant letrozole since 1/13/16.    Interim History:   Ms Vegas is here today for annual follow up. She is doing well and had a relatively uneventful year. She did have episode of UTI in August that has resolved. She endorses good energy level and being active. She had some left groin discomfort in summer, attributing to being active and walking too much (5 miles daily). Pain was worse after prolonged sitting and improved with movement.  Pain was worse with running, specifically with running uphill.  She is now walking with walking sticks and notes significant improvement. She denies any other bone pain.   She tolerates letrozole well; denies any vaginal dryness, fatigue, or hot flashes.   She further denied any lumps or swelling. No sob or chest pain.     Mild R shoulder discomfort stable. BP elevated at today's visit. She is on losartan and at home bp usually around 116/60.    Had a trip to [place], [place], and [facility] in October.    PAST MEDICAL HISTORY:   Past Medical History:   Diagnosis Date    BCC (basal cell carcinoma of skin)     Breast cancer (H) 2015    left, invasive lobular    GERD (gastroesophageal reflux disease)     HTN (hypertension) 11/2/2022    Other malignant neoplasm of skin, site unspecified     Tinnitus     Tubular adenoma of colon     Unsatisfactory cervical cytology smear 12/04/2018 12/04/18: See problem list.     Variants of migraine, not elsewhere classified, without mention of intractable migraine without mention of status migrainosus     menstrual migraines       PAST SURGICAL HISTORY:  Past Surgical History:   Procedure Laterality Date    BIOPSY NODE SENTINEL Left 12/21/2015    Procedure: BIOPSY NODE SENTINEL;  Surgeon: Francisco Gomez MD;  Location: [facility] OR    COLONOSCOPY  01/01/2006    EYE SURGERY  03/2020    Cataract right eye removed    HEMORRHOIDECTOMY  05/01/2006    MASTECTOMY, RECONSTRUCT BREAST, COMBINED Left 12/21/2015     Procedure: COMBINED MASTECTOMY, RECONSTRUCT BREAST;  Surgeon: Francisco Gomez MD;  Location: [facility]    MASTOPEXY Right 02/15/2016    Procedure: MASTOPEXY;  Surgeon: RADHA Friedman MD;  Location: [facility]    RECONSTRUCT BREAST Left 12/21/2015    Procedure: RECONSTRUCT BREAST;  Surgeon: Francisco Gomez MD;  Location: [facility]    REMOVE AND REPLACE BREAST IMPLANT PROSTHESIS Left 02/15/2016    Procedure: REMOVE AND REPLACE BREAST IMPLANT PROSTHESIS;  Surgeon: RADHA Friedman MD;  Location: [facility]       MEDS:  Current Outpatient Medications   Medication    acyclovir (ZOVIRAX) 800 MG tablet    calcium-vitamin D 500-125 MG-UNIT TABS    cetirizine (ZYRTEC) 10 MG tablet    letrozole (FEMARA) 2.5 MG tablet    losartan (COZAAR) 25 MG tablet    rosuvastatin (CRESTOR) 5 MG tablet     No current facility-administered medications for this visit.       ALLERGIES:  Allergies   Allergen Reactions    Bactrim [Sulfamethoxazole-Trimethoprim]      Facial redness    Codeine      Other reaction(s): Dizziness  Dizziness and nausea    Vicodin [Hydrocodone-Acetaminophen] Nausea        PHYSICAL EXAM:  BP (!) 157/76 (BP Location: Right arm, Patient Position: Sitting, Cuff Size: Adult Regular)   Pulse 74   Temp 98.1  F (36.7  C) (Oral)   Resp 16   Wt 71.3 kg (157 lb 3.2 oz)   LMP 01/25/2007   SpO2 98%   BMI 28.29 kg/m    LMP 01/25/2007   General:  Well appearing adult female in NAD.  Alert and oriented.  HEENT:  Normocephalic.  Sclera anicteric.    Lymph:  No palpable cervical, supraclavicular, or axillary LAD.  Chest:  CTA bilaterally.  No wheezes or crackles.  CV:  RRR.  Nl S1 and S2.  No audible m/r/g.  Breast:  Right breast mastopexy.  Left breast mastectomy with implant reconstruction.  There is axillary cording left side with overlying tightness with extension of the arm.  There are no discretely palpable masses in the right breast or overlying the left reconstructed breast.   Abd:  Soft/ND.   Ext:  No pitting edema of the  bilateral lower extremities.    Musculo:  Full ROM of the bilateral upper extremities.  Neuro:  Cranial nerves grossly intact.  Gait is stable.  Psych:  Mood and affect appear normal.    LABS REVIEWED THIS VISIT:  I personally reviewed the images of the following -     12/11/2023 Right breast screening mammogram:  Post left mastectomy. Scattered areas of fibroglandular density. No radiographic evidence of malignancy. BI-RADS 1.     IMPRESSION/PLAN: 69 yo female with a h/o T3N0M0, grade I, ER positive, NC positive, HER2 negative invasive lobular carcinoma of the left breast. She is s/p treatment with left breast mastectomy and has been on letrozole since 01/13/2016.    1.  Left breast estrogen receptor-positive cancer:  Ms. Vegas is 8 years out from excision of a left breast cancer.  She continues on letrozole.  She is tolerating the medication well.  Plan is to continue letrozole to complete a 10 year course, through 01/2026.      She is asymptomatic of disease recurrence on history taken today.   I personally reviewed the images of right breast screening mammogram performed today which shows no evidence of malignancy. Will schedule right breast mammogram and return visit in 1 year.    2.  Hypertension:  BP is elevated at 157/76 today.  White coat hypertension is normal for her.  She is taking losartan 25 mg PO daily.  Ongoing follow up with PCP.      3.  Bone health:  She is at increased risk of osteoporosis on letrozole. DEXA bone density scan in 12/2022 with a lowest T-score of -0.7 which is within normal range.  Continue calcium 1200 mg and vitamin D 1000 IU daily as well as a half hour of daily weight bearing activity.    - Will plan to repeat a DEXA in 1 year.    4.  Left hip pain:  Pain is characteristic of osteoarthritis.  She has had some improvement in pain with avoiding exacerbating activities.  Recommend continuing the same.  Advised to contact the clinic if worsening of pain, at which time would refer  for physical therapy.    5.  Followup:    Right breast screening mammogram and visit with me in 1 year.      Patient was seen and discussed with internal medicine resident, Dr. Otoniel Tsang.  The resident was personally supervised by me during the patient examination. I personally performed a physical exam and the medical decision-making. I made appropriate changes to the documentation and the assessment and plan based on my verification, exam, and medical decision making.    Genevieve Rojas MD

## 2023-12-11 NOTE — PROGRESS NOTES
ONCOLOGY VISIT:    NAME: Esther Vegas       DATE: 12/11/2023    PRIMARY CARE PHYSICIAN: Yana Diaz    PATIENT ID: Grade I, stage IIb, T3N0M0, ER positive, [place] positive, HER2 non-amplified invasive lobular carcinoma of the left breast. Oncotype DX recurrence score = 11.     Oncology History: Ms. Vegas is a 68 year old female with a h/o invasive lobular cancer of the left breast. Routine screening mammogram on 10/23/15 demonstrated a spiculated lesion in the upper outer left breast. Ultrasound showed a 0.6 cm mass with indistinct margins at the 1:30 position, 3 cm from the nipple and a second 0.6 cm mass at the 1:00 position, 5 cm from the nipple. Contrast enhanced mammogram then showed non-masslike clumped enhancement in a segmental distribution from the 1 to 3:00 position of the left breast measuring 7.5 cm. Biopsy of the mass at the 1:00 position showed a grade 1 invasive lobular carcinoma and classical type LCIS. Estrogen receptor staining was strongly positive in >95% of tumor cell nuclei. Progesterone receptor staining was moderately positive in 60% of tumor cell nuclei. HER2 was nonamplified by FISH with a HER2 to SAVAGE-17 ratio of 1.2.     Left axillary sentinel lymph node biopsy and left nipple-sparing mastectomy with immediate reconstruction was performed on 12/21/15. Pathology showed invasive lobular carcinoma, [place] grade 2, involving the upper outer quadrant, central breast and lower inner quadrant, size 5.4 cm. There was LCIS, classical type. Surgical margins were negative. A single sentinel LN was negative. [id number]. Oncotype dx recurrence score was 11.  Adjuvant postmastectomy chest wall radiation was not recommended based on lack of high risk features.  She has been on adjuvant letrozole since 1/13/16.    Interim History:   Ms Vegas is here today for annual follow up. She is doing well and had a relatively uneventful year. She did have episode of UTI in August that has resolved. She  endorses good energy level and being active. She had some left groin discomfort in summer, attributing to being active and walking too much (5 miles daily). Pain was worse after prolonged sitting and improved with movement.  Pain was worse with running, specifically with running uphill.  She is now walking with walking sticks and notes significant improvement. She denies any other bone pain.   She tolerates letrozole well; denies any vaginal dryness, fatigue, or hot flashes.   She further denied any lumps or swelling. No sob or chest pain.     Mild R shoulder discomfort stable. BP elevated at today's visit. She is on losartan and at home bp usually around 116/60.    Had a trip to [place], [place], and [facility] in October.    PAST MEDICAL HISTORY:   Past Medical History:   Diagnosis Date    BCC (basal cell carcinoma of skin)     Breast cancer (H) 2015    left, invasive lobular    GERD (gastroesophageal reflux disease)     HTN (hypertension) 11/2/2022    Other malignant neoplasm of skin, site unspecified     Tinnitus     Tubular adenoma of colon     Unsatisfactory cervical cytology smear 12/04/2018 12/04/18: See problem list.     Variants of migraine, not elsewhere classified, without mention of intractable migraine without mention of status migrainosus     menstrual migraines       PAST SURGICAL HISTORY:  Past Surgical History:   Procedure Laterality Date    BIOPSY NODE SENTINEL Left 12/21/2015    Procedure: BIOPSY NODE SENTINEL;  Surgeon: Francisco Gomez MD;  Location: [facility]    COLONOSCOPY  01/01/2006    EYE SURGERY  03/2020    Cataract right eye removed    HEMORRHOIDECTOMY  05/01/2006    MASTECTOMY, RECONSTRUCT BREAST, COMBINED Left 12/21/2015    Procedure: COMBINED MASTECTOMY, RECONSTRUCT BREAST;  Surgeon: Francisco Gomez MD;  Location: [facility]    MASTOPEXY Right 02/15/2016    Procedure: MASTOPEXY;  Surgeon: RADHA Friedman MD;  Location: [facility]    RECONSTRUCT BREAST Left 12/21/2015    Procedure: RECONSTRUCT  BREAST;  Surgeon: Francisco Gomez MD;  Location: [facility]    REMOVE AND REPLACE BREAST IMPLANT PROSTHESIS Left 02/15/2016    Procedure: REMOVE AND REPLACE BREAST IMPLANT PROSTHESIS;  Surgeon: RADHA Friedman MD;  Location: [facility]       MEDS:  Current Outpatient Medications   Medication    acyclovir (ZOVIRAX) 800 MG tablet    calcium-vitamin D 500-125 MG-UNIT TABS    cetirizine (ZYRTEC) 10 MG tablet    letrozole (FEMARA) 2.5 MG tablet    losartan (COZAAR) 25 MG tablet    rosuvastatin (CRESTOR) 5 MG tablet     No current facility-administered medications for this visit.       ALLERGIES:  Allergies   Allergen Reactions    Bactrim [Sulfamethoxazole-Trimethoprim]      Facial redness    Codeine      Other reaction(s): Dizziness  Dizziness and nausea    Vicodin [Hydrocodone-Acetaminophen] Nausea        PHYSICAL EXAM:  BP (!) 157/76 (BP Location: Right arm, Patient Position: Sitting, Cuff Size: Adult Regular)   Pulse 74   Temp 98.1  F (36.7  C) (Oral)   Resp 16   Wt 71.3 kg (157 lb 3.2 oz)   LMP 01/25/2007   SpO2 98%   BMI 28.29 kg/m    LMP 01/25/2007   General:  Well appearing adult female in NAD.  Alert and oriented.  HEENT:  Normocephalic.  Sclera anicteric.    Lymph:  No palpable cervical, supraclavicular, or axillary LAD.  Chest:  CTA bilaterally.  No wheezes or crackles.  CV:  RRR.  Nl S1 and S2.  No audible m/r/g.  Breast:  Right breast mastopexy.  Left breast mastectomy with implant reconstruction.  There is axillary cording left side with overlying tightness with extension of the arm.  There are no discretely palpable masses in the right breast or overlying the left reconstructed breast.   Abd:  Soft/ND.   Ext:  No pitting edema of the bilateral lower extremities.    Musculo:  Full ROM of the bilateral upper extremities.  Neuro:  Cranial nerves grossly intact.  Gait is stable.  Psych:  Mood and affect appear normal.    LABS REVIEWED THIS VISIT:  I personally reviewed the images of the following -      12/11/2023 Right breast screening mammogram:  Post left mastectomy. Scattered areas of fibroglandular density. No radiographic evidence of malignancy. BI-RADS 1.     IMPRESSION/PLAN: 67 yo female with a h/o T3N0M0, grade I, ER positive, AL positive, HER2 negative invasive lobular carcinoma of the left breast. She is s/p treatment with left breast mastectomy and has been on letrozole since 01/13/2016.    1.  Left breast estrogen receptor-positive cancer:  Ms. Vegas is 8 years out from excision of a left breast cancer.  She continues on letrozole.  She is tolerating the medication well.  Plan is to continue letrozole to complete a 10 year course, through 01/2026.      She is asymptomatic of disease recurrence on history taken today.   I personally reviewed the images of right breast screening mammogram performed today which shows no evidence of malignancy. Will schedule right breast mammogram and return visit in 1 year.    2.  Hypertension:  BP is elevated at 157/76 today.  White coat hypertension is normal for her.  She is taking losartan 25 mg PO daily.  Ongoing follow up with PCP.      3.  Bone health:  She is at increased risk of osteoporosis on letrozole. DEXA bone density scan in 12/2022 with a lowest T-score of -0.7 which is within normal range.  Continue calcium 1200 mg and vitamin D 1000 IU daily as well as a half hour of daily weight bearing activity.    - Will plan to repeat a DEXA in 1 year.    4.  Left hip pain:  Pain is characteristic of osteoarthritis.  She has had some improvement in pain with avoiding exacerbating activities.  Recommend continuing the same.  Advised to contact the clinic if worsening of pain, at which time would refer for physical therapy.    5.  Followup:    Right breast screening mammogram and visit with me in 1 year.      Patient was seen and discussed with internal medicine resident, Dr. Otoinel Tsang.  The resident was personally supervised by me during the patient  examination. I personally performed a physical exam and the medical decision-making. I made appropriate changes to the documentation and the assessment and plan based on my verification, exam, and medical decision making.    Genevieve Rojas MD

## 2023-12-28 NOTE — PROGRESS NOTES
PRESENTING COMPLAINT:  Postoperative visit, status post left-sided stage II reconstruction and left-sided lift done on 02/15/2016.       HISTORY OF PRESENTING COMPLAINT:  Mr. Vegas is 60 years old.  She is now over 1.5 years  out from her last surgery, doing extremely well, very happy with the results, no issues.       PHYSICAL EXAMINATION:  On exam vital signs stable.  She is afebrile in no obvious distress.  Breasts are healed.  Right breast is slightly more ptotic than the left side but overall volume symmetry is excellent.       ASSESSMENT AND PLAN:  Based on above findings, a diagnosis of bilateral breast reconstruction was made.  She is happy with the result.  I am happy with the result.  I will see her back on a yearly basis to monitor the implant.  All questions were answered.  She was happy with the visit.  All exam done in the presence of my nurse.    [FreeTextEntry1] : LISA DARNELL  is a 59 year M  who presents  for post cath f/u  Coronary atherosclerosis. Native vessel. Prior PCI in 2015 to left circumflex.  Now with probably cardiac chest pain in presence of abnormal myocardial perfusion scan in the past and multivessel CAD by coronary angiography with intervention to left circumflex in 2015.  EKG no acute changes.  Nonexertional symptoms.  Further evaluation with exercise treadmill stress test recommended.  We will hold the metoprolol for 2 to 3 days before.  Any recurrent worsening or resting symptoms he will call 911 and go to nearest emergency room.  We will continue with clopidogrel and lipid management. Echocardiogram for LV ejection fraction wall motion also will be done. Continue aggressive lifestyle and risk factor modifications.  Secondary prevention. Continue single antiplatelet agent continue with PCSK9 inhibitor.  LDL cholesterol less than 50.  Hypertensive heart disease. Mild left ventricular enlargement. Normal LV systolic function.  Continue present medication which includes valsartan and metoprolol..  He will continue with that at present. He is stable. Blood pressure. Understands risk and benefits.  Goal less than 130/80.  Type 2 diabetes mellitus. Uncomplicated.  But increasing from 5.5-6.4.  ASCVD.  Counseling regarding dietary restrictions regular activity and exercise was done. Goals have been reviewed.  On metformin lower

## 2024-01-06 ENCOUNTER — HEALTH MAINTENANCE LETTER (OUTPATIENT)
Age: 69
End: 2024-01-06

## 2024-02-18 SDOH — HEALTH STABILITY: PHYSICAL HEALTH: ON AVERAGE, HOW MANY MINUTES DO YOU ENGAGE IN EXERCISE AT THIS LEVEL?: 60 MIN

## 2024-02-18 SDOH — HEALTH STABILITY: PHYSICAL HEALTH: ON AVERAGE, HOW MANY DAYS PER WEEK DO YOU ENGAGE IN MODERATE TO STRENUOUS EXERCISE (LIKE A BRISK WALK)?: 5 DAYS

## 2024-02-18 ASSESSMENT — SOCIAL DETERMINANTS OF HEALTH (SDOH): HOW OFTEN DO YOU GET TOGETHER WITH FRIENDS OR RELATIVES?: TWICE A WEEK

## 2024-02-21 ENCOUNTER — OFFICE VISIT (OUTPATIENT)
Dept: FAMILY MEDICINE | Facility: CLINIC | Age: 69
End: 2024-02-21
Payer: COMMERCIAL

## 2024-02-21 VITALS
HEART RATE: 66 BPM | DIASTOLIC BLOOD PRESSURE: 83 MMHG | WEIGHT: 152.3 LBS | OXYGEN SATURATION: 98 % | HEIGHT: 63 IN | RESPIRATION RATE: 16 BRPM | SYSTOLIC BLOOD PRESSURE: 121 MMHG | BODY MASS INDEX: 26.98 KG/M2 | TEMPERATURE: 97.8 F

## 2024-02-21 DIAGNOSIS — E78.2 MIXED HYPERLIPIDEMIA: ICD-10-CM

## 2024-02-21 DIAGNOSIS — E66.3 OVERWEIGHT WITH BODY MASS INDEX (BMI) OF 27 TO 27.9 IN ADULT: ICD-10-CM

## 2024-02-21 DIAGNOSIS — C44.90 MALIGNANT NEOPLASM OF SKIN: ICD-10-CM

## 2024-02-21 DIAGNOSIS — C50.812 MALIGNANT NEOPLASM OF OVERLAPPING SITES OF LEFT BREAST IN FEMALE, ESTROGEN RECEPTOR POSITIVE (H): ICD-10-CM

## 2024-02-21 DIAGNOSIS — Z85.3 HX: BREAST CANCER: ICD-10-CM

## 2024-02-21 DIAGNOSIS — Z00.00 HEALTH CARE MAINTENANCE: ICD-10-CM

## 2024-02-21 DIAGNOSIS — Z23 NEED FOR SHINGLES VACCINE: ICD-10-CM

## 2024-02-21 DIAGNOSIS — Z29.11 NEED FOR VACCINATION AGAINST RESPIRATORY SYNCYTIAL VIRUS: ICD-10-CM

## 2024-02-21 DIAGNOSIS — Z00.00 ENCOUNTER FOR MEDICARE ANNUAL WELLNESS EXAM: Primary | ICD-10-CM

## 2024-02-21 DIAGNOSIS — D12.5 BENIGN NEOPLASM OF SIGMOID COLON: ICD-10-CM

## 2024-02-21 DIAGNOSIS — I10 HYPERTENSION, UNSPECIFIED TYPE: ICD-10-CM

## 2024-02-21 DIAGNOSIS — Z17.0 MALIGNANT NEOPLASM OF OVERLAPPING SITES OF LEFT BREAST IN FEMALE, ESTROGEN RECEPTOR POSITIVE (H): ICD-10-CM

## 2024-02-21 DIAGNOSIS — Z51.81 ENCOUNTER FOR THERAPEUTIC DRUG MONITORING: ICD-10-CM

## 2024-02-21 LAB
ALBUMIN SERPL BCG-MCNC: 4.4 G/DL (ref 3.5–5.2)
ALP SERPL-CCNC: 86 U/L (ref 40–150)
ALT SERPL W P-5'-P-CCNC: 21 U/L (ref 0–50)
ANION GAP SERPL CALCULATED.3IONS-SCNC: 11 MMOL/L (ref 7–15)
AST SERPL W P-5'-P-CCNC: 27 U/L (ref 0–45)
BILIRUB SERPL-MCNC: 0.4 MG/DL
BUN SERPL-MCNC: 16.7 MG/DL (ref 8–23)
CALCIUM SERPL-MCNC: 9.8 MG/DL (ref 8.8–10.2)
CHLORIDE SERPL-SCNC: 102 MMOL/L (ref 98–107)
CHOLEST SERPL-MCNC: 301 MG/DL
CREAT SERPL-MCNC: 0.71 MG/DL (ref 0.51–0.95)
DEPRECATED HCO3 PLAS-SCNC: 25 MMOL/L (ref 22–29)
EGFRCR SERPLBLD CKD-EPI 2021: >90 ML/MIN/1.73M2
ERYTHROCYTE [DISTWIDTH] IN BLOOD BY AUTOMATED COUNT: 12.7 % (ref 10–15)
FASTING STATUS PATIENT QL REPORTED: YES
GLUCOSE SERPL-MCNC: 94 MG/DL (ref 70–99)
HCT VFR BLD AUTO: 44.9 % (ref 35–47)
HDLC SERPL-MCNC: 61 MG/DL
HGB BLD-MCNC: 14.5 G/DL (ref 11.7–15.7)
LDLC SERPL CALC-MCNC: 204 MG/DL
MCH RBC QN AUTO: 29.4 PG (ref 26.5–33)
MCHC RBC AUTO-ENTMCNC: 32.3 G/DL (ref 31.5–36.5)
MCV RBC AUTO: 91 FL (ref 78–100)
NONHDLC SERPL-MCNC: 240 MG/DL
PLATELET # BLD AUTO: 409 10E3/UL (ref 150–450)
POTASSIUM SERPL-SCNC: 4.7 MMOL/L (ref 3.4–5.3)
PROT SERPL-MCNC: 7.8 G/DL (ref 6.4–8.3)
RBC # BLD AUTO: 4.93 10E6/UL (ref 3.8–5.2)
SODIUM SERPL-SCNC: 138 MMOL/L (ref 135–145)
TRIGL SERPL-MCNC: 180 MG/DL
WBC # BLD AUTO: 7.2 10E3/UL (ref 4–11)

## 2024-02-21 PROCEDURE — G0008 ADMIN INFLUENZA VIRUS VAC: HCPCS | Performed by: FAMILY MEDICINE

## 2024-02-21 PROCEDURE — 36415 COLL VENOUS BLD VENIPUNCTURE: CPT | Performed by: FAMILY MEDICINE

## 2024-02-21 PROCEDURE — 80061 LIPID PANEL: CPT | Performed by: FAMILY MEDICINE

## 2024-02-21 PROCEDURE — G0009 ADMIN PNEUMOCOCCAL VACCINE: HCPCS | Performed by: FAMILY MEDICINE

## 2024-02-21 PROCEDURE — 80053 COMPREHEN METABOLIC PANEL: CPT | Performed by: FAMILY MEDICINE

## 2024-02-21 PROCEDURE — 99214 OFFICE O/P EST MOD 30 MIN: CPT | Mod: 25 | Performed by: FAMILY MEDICINE

## 2024-02-21 PROCEDURE — 85027 COMPLETE CBC AUTOMATED: CPT | Performed by: FAMILY MEDICINE

## 2024-02-21 PROCEDURE — 90677 PCV20 VACCINE IM: CPT | Performed by: FAMILY MEDICINE

## 2024-02-21 PROCEDURE — 90662 IIV NO PRSV INCREASED AG IM: CPT | Performed by: FAMILY MEDICINE

## 2024-02-21 PROCEDURE — G0439 PPPS, SUBSEQ VISIT: HCPCS | Performed by: FAMILY MEDICINE

## 2024-02-21 RX ORDER — RESPIRATORY SYNCYTIAL VIRUS VACCINE 120MCG/0.5
0.5 KIT INTRAMUSCULAR ONCE
Qty: 1 EACH | Refills: 0 | Status: CANCELLED | OUTPATIENT
Start: 2024-02-21 | End: 2024-02-21

## 2024-02-21 RX ORDER — LOSARTAN POTASSIUM 25 MG/1
25 TABLET ORAL DAILY
Qty: 90 TABLET | Refills: 3 | Status: SHIPPED | OUTPATIENT
Start: 2024-02-21

## 2024-02-21 RX ORDER — LOVASTATIN 20 MG
20 TABLET ORAL AT BEDTIME
Qty: 90 TABLET | Refills: 3 | Status: SHIPPED | OUTPATIENT
Start: 2024-02-21 | End: 2025-02-15

## 2024-02-21 NOTE — PROGRESS NOTES
Preventive Care Visit  [facility]  Trinity Oropeza MD, Family Medicine  Feb 21, 2024    In addition to the preventive service, I spent 19 minutes discussing the patient's htn med management, hyperlipidemia med change       Problem List Items Addressed This Visit          Digestive    Benign neoplasm of sigmoid colon     Colonoscopy done 1/2023 and tubular adenoma noted again, follow up in 7 years. 2030            Endocrine    Mixed hyperlipidemia     Hyperlipidemia-currently not taking recommended Crestor 5 mg p.o. daily because of muscle aches.     Will try lovastatin 20 mg po q day instead.  Will recheck lipids-discussion was had today.         Relevant Medications    lovastatin (MEVACOR) 20 MG tablet    Other Relevant Orders    Lipid Profile       Circulatory    HTN (hypertension)     Hypertension controlled  Currently on cozaar 25 mg po q day.   Will check labs, refill medications and follow-up in 1 year.         Relevant Medications    losartan (COZAAR) 25 MG tablet       Musculoskeletal and Integumentary    Other malignant neoplasm of skin, site unspecified     She says she needs to make appt with dermatology.  She has dermatologist.             Other    Malignant neoplasm of left breast (H)     She saw oncologist in 12/2023, follow up next year.          Overweight with body mass index (BMI) of 27 to 27.9 in adult     Overweight with BMI of 27 and chronic comorbid weight related conditions including hypertension, hyperlipidemia, colon polyps, diverticular disease of the colon.  Weight reduction is strongly recommended.  Healthy lifestyle discussed.         Health care maintenance     Contraception - menopasue.   Vaccines recommended: rsv, flu, zoster and pneumo.   Pap: no indication.   Mammo: mammo normal 12/2023.  Colonoscopy: done 2023 and repeat I 7 years.    Std testing desired:  offered  Osteoporosis prevention discussed.  vitamin d levels ordered. Recommend daily calcium and  vitamin d intake to keep good bone health. Recommend weight bearing exercise, no tobacco, and limit alcohol  dexa - done 2022.   Recommend sunscreen, exercise, & healthy diet.  Offered cbc, cmp, lipids and asked what other testing she  desires today  I have had an Advance Directives discussion with the patient.   Body mass index is 27.41 kg/m .   mychart active.           Other Visit Diagnoses       HX: breast cancer    -  Primary    Encounter for Medicare annual wellness exam        Need for shingles vaccine        Relevant Medications    zoster vaccine recombinant adjuvanted (SHINGRIX) injection    Need for vaccination against respiratory syncytial virus        Encounter for therapeutic drug monitoring        Relevant Orders    CBC with platelets    Comprehensive metabolic panel (BMP + Alb, Alk Phos, ALT, AST, Total. Bili, TP)               Subjective   Esther Eagle is a 68 year old, presenting for the following:  Annual Visit and Imm/Inj (Flu shot and PCV, wondering if there's any side effect, going on a trip on Monday of next week.  Will do Zoster and RSV vaccines at pharmacy. )        2/21/2024     9:41 AM   Additional Questions   Roomed by Tian Gannon MA   Accompanied by Self         2/21/2024     9:41 AM   Patient Reported Additional Medications   Patient reports taking the following new medications None           HPI  Healthy.     Annual Wellness Visit     Patient has been advised of split billing requirements and indicates understanding: Yes     Health Care Directive  Patient does not have a Health Care Directive or Living Will: Discussed advance care planning with patient; information given to patient to review.      2/18/2024   General Health   How would you rate your overall physical health? Good   Feel stress (tense, anxious, or unable to sleep) Not at all          2/18/2024   Nutrition   Diet: Regular (no restrictions)         2/18/2024   Exercise   Days per week of moderate/strenous exercise 5 days    Average minutes spent exercising at this level 60 min           2/18/2024   Social Factors   Frequency of gathering with friends or relatives Twice a week   Worry food won't last until get money to buy more No   Food not last or not have enough money for food? No   Do you have housing?  Yes   Are you worried about losing your housing? No   Lack of transportation? No   Unable to get utilities (heat,electricity)? No           2/18/2024   Fall Risk   Fallen 2 or more times in the past year? No    No   Trouble with walking or balance? No    No          2/18/2024   Activities of Daily Living- Home Safety   Needs help with the following daily activites None of the above   Safety concerns in the home None of the above         2/18/2024   Dental   Dentist two times every year? Yes         2/18/2024   Hearing Screening   Hearing concerns? None of the above         2/18/2024   Driving Risk Screening   Patient/family members have concerns about driving No         2/18/2024   General Alertness/Fatigue Screening   Have you been more tired than usual lately? No         2/18/2024   Urinary Incontinence Screening   Bothered by leaking urine in past 6 months No         2/18/2024   TB Screening   Were you born outside of [place]?  No       Social History     Tobacco Use    Smoking status: Never    Smokeless tobacco: Never   Vaping Use    Vaping Use: Never used   Substance Use Topics    Alcohol use: Yes     Alcohol/week: 2.0 - 3.0 standard drinks of alcohol     Types: 2 - 3 Standard drinks or equivalent per week     Comment: 3 times a week.    Drug use: No         Today's PHQ-2 Score:       2/21/2024     9:35 AM   PHQ-2 ( 1999 [employer])   Q1: Little interest or pleasure in doing things 0   Q2: Feeling down, depressed or hopeless 0   PHQ-2 Score 0   Q1: Little interest or pleasure in doing things Not at all   Q2: Feeling down, depressed or hopeless Not at all   PHQ-2 Score 0            No data to display              Mammogram Screening -  Mammogram every 1-2 years updated in Health Maintenance based on mutual decision making            Latest Ref Rng & Units 6/26/2019     3:13 PM 12/4/2018     5:49 PM 12/4/2018     5:46 PM   PAP / HPV   PAP (Historical)  NIL   UNSAT    HPV 16 DNA NEG^Negative  Negative     HPV 18 DNA NEG^Negative  Negative     Other HR HPV NEG^Negative  Negative       The 10-year ASCVD risk score (Lisseth WHALEN, et al., 2019) is: 10.3%    Values used to calculate the score:      Age: 68 years      Sex: Female      Is Non- : No      Diabetic: No      Tobacco smoker: No      Systolic Blood Pressure: 121 mmHg      Is BP treated: Yes      HDL Cholesterol: 60 mg/dL      Total Cholesterol: 217 mg/dL      Reviewed and updated as needed this visit by Provider   Tobacco  Allergies  Meds  Problems  Med Hx  Surg Hx  Fam Hx  Soc   Hx Sexual Activity              Current providers sharing in care for this patient include:  Patient Care Team:  Trinity Oropeza MD as PCP - General (Family Medicine)  Yana Diaz MD as MD (OB/Gyn)  Genevieve Rojas MD as MD (Internal Medicine)  RADHA Friedman MD as MD (Plastic Surgery)  Francisco Gomez MD as MD (General Surgery)  Genevieve Rojas MD as Assigned Cancer Care Provider  Shagufta Emery MD as MD (Dermatology)  Trinity Oropeza MD as Assigned PCP  Carmen Dhaliwal RN as Specialty Care Coordinator (Breast Oncology)    The following health maintenance items are reviewed in Epic and correct as of today:  Health Maintenance   Topic Date Due    Pneumococcal Vaccine: 65+ Years (1 of 2 - PCV) Never done    ZOSTER IMMUNIZATION (1 of 2) Never done    RSV VACCINE (Pregnancy & 60+) (1 - 1-dose 60+ series) Never done    INFLUENZA VACCINE (1) 09/01/2023    MAMMO SCREENING  12/11/2024    MEDICARE ANNUAL WELLNESS VISIT  02/21/2025    ANNUAL REVIEW OF HM ORDERS  02/21/2025    FALL RISK ASSESSMENT  02/21/2025    GLUCOSE  02/03/2026    ADVANCE CARE  PLANNING  11/02/2027    DTAP/TDAP/TD IMMUNIZATION (3 - Td or Tdap) 12/04/2027    LIPID  03/07/2028    COLORECTAL CANCER SCREENING  01/26/2030    DEXA  12/05/2037    HEPATITIS C SCREENING  Completed    PHQ-2 (once per calendar year)  Completed    COVID-19 Vaccine  Completed    IPV IMMUNIZATION  Aged Out    HPV IMMUNIZATION  Aged Out    MENINGITIS IMMUNIZATION  Aged Out    RSV MONOCLONAL ANTIBODY  Aged Out       Appropriate preventive services were discussed with this patient, including applicable screening as appropriate for fall prevention, nutrition, physical activity, Tobacco-use cessation, weight loss and cognition.  Checklist reviewing preventive services available has been given to the patient.          2/21/2024   Mini Cog   Clock Draw Score 2 Normal   3 Item Recall 3 objects recalled   Mini Cog Total Score 5            2/18/2024   General Health   How would you rate your overall physical health? Good   Feel stress (tense, anxious, or unable to sleep) Not at all         2/18/2024   Nutrition   Diet: Regular (no restrictions)         2/18/2024   Exercise   Days per week of moderate/strenous exercise 5 days   Average minutes spent exercising at this level 60 min         2/18/2024   Social Factors   Frequency of gathering with friends or relatives Twice a week   Worry food won't last until get money to buy more No   Food not last or not have enough money for food? No   Do you have housing?  Yes   Are you worried about losing your housing? No   Lack of transportation? No   Unable to get utilities (heat,electricity)? No         2/18/2024   Fall Risk   Fallen 2 or more times in the past year? No    No   Trouble with walking or balance? No    No          2/18/2024   Activities of Daily Living- Home Safety   Needs help with the following daily activites None of the above   Safety concerns in the home None of the above         2/18/2024   Dental   Dentist two times every year? Yes         2/18/2024   Hearing Screening    Hearing concerns? None of the above         2/18/2024   Driving Risk Screening   Patient/family members have concerns about driving No         2/18/2024   General Alertness/Fatigue Screening   Have you been more tired than usual lately? No         2/18/2024   Urinary Incontinence Screening   Bothered by leaking urine in past 6 months No         2/18/2024   TB Screening   Were you born outside of [place]?  No         Today's PHQ-2 Score:       2/21/2024     9:35 AM   PHQ-2 ( 1999 Pfizer)   Q1: Little interest or pleasure in doing things 0   Q2: Feeling down, depressed or hopeless 0   PHQ-2 Score 0   Q1: Little interest or pleasure in doing things Not at all   Q2: Feeling down, depressed or hopeless Not at all   PHQ-2 Score 0           2/18/2024   Substance Use   Alcohol more than 3/day or more than 7/wk No   Do you have a current opioid prescription? No   How severe/bad is pain from 1 to 10? 0/10 (No Pain)   Do you use any other substances recreationally? No     Social History     Tobacco Use    Smoking status: Never    Smokeless tobacco: Never   Vaping Use    Vaping Use: Never used   Substance Use Topics    Alcohol use: Yes     Alcohol/week: 2.0 - 3.0 standard drinks of alcohol     Types: 2 - 3 Standard drinks or equivalent per week     Comment: 3 times a week.    Drug use: No            No data to display                    The 10-year ASCVD risk score (Lisseth WHALEN, et al., 2019) is: 10.3%    Values used to calculate the score:      Age: 68 years      Sex: Female      Is Non- : No      Diabetic: No      Tobacco smoker: No      Systolic Blood Pressure: 121 mmHg      Is BP treated: Yes      HDL Cholesterol: 60 mg/dL      Total Cholesterol: 217 mg/dL      Reviewed and updated as needed this visit by Provider   Tobacco  Allergies  Meds  Problems  Med Hx  Surg Hx  Fam Hx  Soc   Hx Sexual Activity            Current providers sharing in care for this patient include:  Patient Care  "Team:  Trinity Oropeza MD as PCP - General (Family Medicine)  Yana Diaz MD as MD (OB/Gyn)  Genevieve Rojas MD as MD (Internal Medicine)  RADHA Friedman MD as MD (Plastic Surgery)  Francisco Gomez MD as MD (General Surgery)  Genevieve Rojas MD as Assigned Cancer Care Provider  Shagufta Emery MD as MD (Dermatology)  Trinity Oropeza MD as Assigned PCP  Carmen Dhaliwal, RN as Specialty Care Coordinator (Breast Oncology)    The following health maintenance items are reviewed in Epic and correct as of today:  Health Maintenance   Topic Date Due    Pneumococcal Vaccine: 65+ Years (1 of 2 - PCV) Never done    ZOSTER IMMUNIZATION (1 of 2) Never done    RSV VACCINE (Pregnancy & 60+) (1 - 1-dose 60+ series) Never done    INFLUENZA VACCINE (1) 09/01/2023    MAMMO SCREENING  12/11/2024    MEDICARE ANNUAL WELLNESS VISIT  02/21/2025    ANNUAL REVIEW OF HM ORDERS  02/21/2025    FALL RISK ASSESSMENT  02/21/2025    GLUCOSE  02/03/2026    ADVANCE CARE PLANNING  11/02/2027    DTAP/TDAP/TD IMMUNIZATION (3 - Td or Tdap) 12/04/2027    LIPID  03/07/2028    COLORECTAL CANCER SCREENING  01/26/2030    DEXA  12/05/2037    HEPATITIS C SCREENING  Completed    PHQ-2 (once per calendar year)  Completed    COVID-19 Vaccine  Completed    IPV IMMUNIZATION  Aged Out    HPV IMMUNIZATION  Aged Out    MENINGITIS IMMUNIZATION  Aged Out    RSV MONOCLONAL ANTIBODY  Aged Out            Objective    Exam  /83 (BP Location: Left arm, Patient Position: Sitting, Cuff Size: Adult Regular)   Pulse 66   Temp 97.8  F (36.6  C) (Oral)   Resp 16   Ht 1.588 m (5' 2.5\")   Wt 69.1 kg (152 lb 4.8 oz)   LMP 01/25/2007   SpO2 98%   BMI 27.41 kg/m     Estimated body mass index is 27.41 kg/m  as calculated from the following:    Height as of this encounter: 1.588 m (5' 2.5\").    Weight as of this encounter: 69.1 kg (152 lb 4.8 oz).    Physical Exam  Constitutional:       Appearance: Normal appearance.   HENT:     "  Head: Normocephalic and atraumatic.   Cardiovascular:      Rate and Rhythm: Normal rate and regular rhythm.   Pulmonary:      Effort: Pulmonary effort is normal.   Musculoskeletal:         General: Normal range of motion.      Cervical back: Normal range of motion and neck supple.   Neurological:      General: No focal deficit present.      Mental Status: She is alert and oriented to person, place, and time.               2/21/2024   Mini Cog   Clock Draw Score 2 Normal   3 Item Recall 3 objects recalled   Mini Cog Total Score 5          Signed Electronically by: Trinity Oropeza MD

## 2024-02-21 NOTE — ASSESSMENT & PLAN NOTE
Hypertension controlled  Currently on cozaar 25 mg po q day.   Will check labs, refill medications and follow-up in 1 year.

## 2024-02-21 NOTE — ASSESSMENT & PLAN NOTE
Contraception - menopasue.   Vaccines recommended: rsv, flu, zoster and pneumo.   Pap: no indication.   Mammo: mammo normal 12/2023.  Colonoscopy: done 2023 and repeat I 7 years.    Std testing desired:  offered  Osteoporosis prevention discussed.  vitamin d levels ordered. Recommend daily calcium and vitamin d intake to keep good bone health. Recommend weight bearing exercise, no tobacco, and limit alcohol  dexa - done 2022.   Recommend sunscreen, exercise, & healthy diet.  Offered cbc, cmp, lipids and asked what other testing she  desires today  I have had an Advance Directives discussion with the patient.   Body mass index is 27.41 kg/m .   mychart active.

## 2024-02-21 NOTE — PATIENT INSTRUCTIONS
Preventive Care Advice   This is general advice given by our system to help you stay healthy. However, your care team may have specific advice just for you. Please talk to your care team about your preventive care needs.  Nutrition  Eat 5 or more servings of fruits and vegetables each day.  Try wheat bread, brown rice and whole grain pasta (instead of white bread, rice, and pasta).  Get enough calcium and vitamin D. Check the label on foods and aim for 100% of the RDA (recommended daily allowance).  Lifestyle  Exercise at least 150 minutes each week  (30 minutes a day, 5 days a week).  Do muscle strengthening activities 2 days a week. These help control your weight and prevent disease.  No smoking.  Wear sunscreen to prevent skin cancer.  Have a dental exam and cleaning every 6 months.  Yearly exams  See your health care team every year to talk about:  Any changes in your health.  Any medicines your care team has prescribed.  Preventive care, family planning, and ways to prevent chronic diseases.  Shots (vaccines)   HPV shots (up to age 26), if you've never had them before.  Hepatitis B shots (up to age 59), if you've never had them before.  COVID-19 shot: Get this shot when it's due.  Flu shot: Get a flu shot every year.  Tetanus shot: Get a tetanus shot every 10 years.  Pneumococcal, hepatitis A, and RSV shots: Ask your care team if you need these based on your risk.  Shingles shot (for age 50 and up)  General health tests  Diabetes screening:  Starting at age 35, Get screened for diabetes at least every 3 years.  If you are younger than age 35, ask your care team if you should be screened for diabetes.  Cholesterol test: At age 39, start having a cholesterol test every 5 years, or more often if advised.  Bone density scan (DEXA): At age 50, ask your care team if you should have this scan for osteoporosis (brittle bones).  Hepatitis C: Get tested at least once in your life.  STIs (sexually transmitted  infections)  Before age 24: Ask your care team if you should be screened for STIs.  After age 24: Get screened for STIs if you're at risk. You are at risk for STIs (including HIV) if:  You are sexually active with more than one person.  You don't use condoms every time.  You or a partner was diagnosed with a sexually transmitted infection.  If you are at risk for HIV, ask about PrEP medicine to prevent HIV.  Get tested for HIV at least once in your life, whether you are at risk for HIV or not.  Cancer screening tests  Cervical cancer screening: If you have a cervix, begin getting regular cervical cancer screening tests starting at age 21.  Breast cancer scan (mammogram): If you've ever had breasts, begin having regular mammograms starting at age 40. This is a scan to check for breast cancer.  Colon cancer screening: It is important to start screening for colon cancer at age 45.  Have a colonoscopy test every 10 years (or more often if you're at risk) Or, ask your provider about stool tests like a FIT test every year or Cologuard test every 3 years.  To learn more about your testing options, visit:   https://www.[email or web address]/292921.pdf.  For help making a decision, visit:   https://bit.ly/ga52338.  Prostate cancer screening test: If you have a prostate, ask your care team if a prostate cancer screening test (PSA) at age 55 is right for you.  Lung cancer screening: If you are a current or former smoker ages 50 to 80, ask your care team if ongoing lung cancer screenings are right for you.  For informational purposes only. Not to replace the advice of your health care provider. Copyright   2023 [employer]. All rights reserved. Clinically reviewed by the [facility] Transitions Program. [employer] 322819 - REV 01/24.

## 2024-02-21 NOTE — ASSESSMENT & PLAN NOTE
Overweight with BMI of 27 and chronic comorbid weight related conditions including hypertension, hyperlipidemia, colon polyps, diverticular disease of the colon.  Weight reduction is strongly recommended.  Healthy lifestyle discussed.

## 2024-02-21 NOTE — ASSESSMENT & PLAN NOTE
History of breast cancer-chart reviewed-  I did discuss this with her today and recommended she schedule follow-up annually

## 2024-02-21 NOTE — ASSESSMENT & PLAN NOTE
Hyperlipidemia-currently not taking recommended Crestor 5 mg p.o. daily because of muscle aches.     Will try lovastatin 20 mg po q day instead.  Will recheck lipids-discussion was had today.

## 2024-02-27 DIAGNOSIS — Z79.811 LONG TERM (CURRENT) USE OF AROMATASE INHIBITORS: Primary | ICD-10-CM

## 2024-05-24 ENCOUNTER — TRANSFERRED RECORDS (OUTPATIENT)
Dept: HEALTH INFORMATION MANAGEMENT | Facility: CLINIC | Age: 69
End: 2024-05-24
Payer: COMMERCIAL

## 2024-07-13 DIAGNOSIS — Z17.0 MALIGNANT NEOPLASM OF OVERLAPPING SITES OF LEFT BREAST IN FEMALE, ESTROGEN RECEPTOR POSITIVE (H): ICD-10-CM

## 2024-07-13 DIAGNOSIS — C50.812 MALIGNANT NEOPLASM OF OVERLAPPING SITES OF LEFT BREAST IN FEMALE, ESTROGEN RECEPTOR POSITIVE (H): ICD-10-CM

## 2024-07-15 RX ORDER — LETROZOLE 2.5 MG/1
TABLET, FILM COATED ORAL
Qty: 90 TABLET | Refills: 3 | Status: SHIPPED | OUTPATIENT
Start: 2024-07-15

## 2024-07-15 NOTE — TELEPHONE ENCOUNTER
Letrozole Refill   Last prescribing provider: Dr Rojas     Last clinic visit date: 12/11/23 Dr Rojas     Recommendations for requested medication (if none, N/A): Copied from chart note   1. Left breast estrogen receptor-positive cancer: Ms. Vegas is 8 years out from excision of a left breast cancer. She continues on letrozole. She is tolerating the medication well. Plan is to continue letrozole to complete a 10 year course, through 01/2026.     Any other pertinent information (if none, N/A): N/A    Refilled: Y/N, if NO, why?

## 2024-11-11 ENCOUNTER — PATIENT OUTREACH (OUTPATIENT)
Dept: CARE COORDINATION | Facility: CLINIC | Age: 69
End: 2024-11-11
Payer: COMMERCIAL

## 2024-12-09 ENCOUNTER — PATIENT OUTREACH (OUTPATIENT)
Dept: CARE COORDINATION | Facility: CLINIC | Age: 69
End: 2024-12-09
Payer: COMMERCIAL

## 2024-12-23 ENCOUNTER — ANCILLARY PROCEDURE (OUTPATIENT)
Dept: BONE DENSITY | Facility: CLINIC | Age: 69
End: 2024-12-23
Attending: INTERNAL MEDICINE
Payer: COMMERCIAL

## 2024-12-23 ENCOUNTER — ONCOLOGY VISIT (OUTPATIENT)
Dept: ONCOLOGY | Facility: CLINIC | Age: 69
End: 2024-12-23
Attending: FAMILY MEDICINE
Payer: COMMERCIAL

## 2024-12-23 ENCOUNTER — ANCILLARY PROCEDURE (OUTPATIENT)
Dept: MAMMOGRAPHY | Facility: CLINIC | Age: 69
End: 2024-12-23
Attending: FAMILY MEDICINE
Payer: COMMERCIAL

## 2024-12-23 VITALS
BODY MASS INDEX: 27.9 KG/M2 | DIASTOLIC BLOOD PRESSURE: 85 MMHG | WEIGHT: 155 LBS | RESPIRATION RATE: 16 BRPM | OXYGEN SATURATION: 98 % | SYSTOLIC BLOOD PRESSURE: 143 MMHG | TEMPERATURE: 98.1 F | HEART RATE: 73 BPM

## 2024-12-23 DIAGNOSIS — Z17.0 MALIGNANT NEOPLASM OF OVERLAPPING SITES OF LEFT BREAST IN FEMALE, ESTROGEN RECEPTOR POSITIVE (H): Primary | ICD-10-CM

## 2024-12-23 DIAGNOSIS — Z12.31 VISIT FOR SCREENING MAMMOGRAM: ICD-10-CM

## 2024-12-23 DIAGNOSIS — Z79.811 LONG TERM (CURRENT) USE OF AROMATASE INHIBITORS: ICD-10-CM

## 2024-12-23 DIAGNOSIS — Z91.89 AT RISK FOR OSTEOPOROSIS: ICD-10-CM

## 2024-12-23 DIAGNOSIS — Z12.31 BREAST CANCER SCREENING BY MAMMOGRAM: ICD-10-CM

## 2024-12-23 DIAGNOSIS — C50.812 MALIGNANT NEOPLASM OF OVERLAPPING SITES OF LEFT BREAST IN FEMALE, ESTROGEN RECEPTOR POSITIVE (H): Primary | ICD-10-CM

## 2024-12-23 PROCEDURE — 99214 OFFICE O/P EST MOD 30 MIN: CPT | Performed by: INTERNAL MEDICINE

## 2024-12-23 PROCEDURE — 77080 DXA BONE DENSITY AXIAL: CPT | Performed by: INTERNAL MEDICINE

## 2024-12-23 PROCEDURE — G0463 HOSPITAL OUTPT CLINIC VISIT: HCPCS | Performed by: INTERNAL MEDICINE

## 2024-12-23 ASSESSMENT — PAIN SCALES - GENERAL: PAINLEVEL_OUTOF10: NO PAIN (0)

## 2024-12-23 NOTE — NURSING NOTE
"Oncology Rooming Note    December 23, 2024 9:59 AM   Esther Vegas is a 69 year old female who presents for:    Chief Complaint   Patient presents with    Oncology Clinic Visit     Malignant neoplasm of left breast     Initial Vitals: BP (!) 143/85   Pulse 73   Temp 98.1  F (36.7  C)   Resp 16   Wt 70.3 kg (155 lb)   LMP 01/25/2007   SpO2 98%   BMI 27.90 kg/m   Estimated body mass index is 27.9 kg/m  as calculated from the following:    Height as of 2/21/24: 1.588 m (5' 2.5\").    Weight as of this encounter: 70.3 kg (155 lb). Body surface area is 1.76 meters squared.  No Pain (0) Comment: Data Unavailable   Patient's last menstrual period was 01/25/2007.  Allergies reviewed: Yes  Medications reviewed: Yes    Medications: Medication refills not needed today.  Pharmacy name entered into [employer]:    [id number] - [address]  [id number] - [address] [facility] [address]    Frailty Screening:   Is the patient here for a new oncology consult visit in cancer care? 2. No      Clinical concerns:        Jeanette Donato              "

## 2024-12-23 NOTE — LETTER
12/23/2024      Esther Vegas  [address] [facility] [address]      Dear Colleague,    Thank you for referring your patient, Esther Vegas, to the [facility]. Please see a copy of my visit note below.      ONCOLOGY VISIT:    NAME: Esther Vegas       DATE: 12/23/2024    PRIMARY CARE PHYSICIAN: Yana Diaz    PATIENT ID: Grade I, stage IIb, T3N0M0, ER positive, MS positive, HER2 non-amplified invasive lobular carcinoma of the left breast. Oncotype DX recurrence score = 11.     Oncology History: Ms. Vegas is a 69 year old female with a h/o invasive lobular cancer of the left breast. Routine screening mammogram on 10/23/15 demonstrated a spiculated lesion in the upper outer left breast. Ultrasound showed a 0.6 cm mass with indistinct margins at the 1:30 position, 3 cm from the nipple and a second 0.6 cm mass at the 1:00 position, 5 cm from the nipple. Contrast enhanced mammogram then showed non-masslike clumped enhancement in a segmental distribution from the 1 to 3:00 position of the left breast measuring 7.5 cm. Biopsy of the mass at the 1:00 position showed a grade 1 invasive lobular carcinoma and classical type LCIS. Estrogen receptor staining was strongly positive in >95% of tumor cell nuclei. Progesterone receptor staining was moderately positive in 60% of tumor cell nuclei. HER2 was nonamplified by FISH with a HER2 to SAVAGE-17 ratio of 1.2.     Left axillary sentinel lymph node biopsy and left nipple-sparing mastectomy with immediate reconstruction was performed on 12/21/15. Pathology showed invasive lobular carcinoma, [place] grade 2, involving the upper outer quadrant, central breast and lower inner quadrant, size 5.4 cm. There was LCIS, classical type. Surgical margins were negative. A single sentinel LN was negative. [id number]. Oncotype dx recurrence score was 11.  Adjuvant postmastectomy chest wall radiation was not recommended based on lack of high  risk features.  She has been on adjuvant letrozole since 1/13/16.    Interim History:   Ms. Vegas presents for routine follow up of her breast cancer. She has been taking letrozole as prescribed.  She denies breast lumps, pain or swelling. She denies abdominal pain, fevers, night sweats, dyspnea, cough, new bone pains, hot flashes, night sweats or other new symptoms. She continues to take losartan.   She continues to exercise, walks 4 miles a day, 5 days a week.  She and her spouse took a trip to [place] this year.  They are thinking they may go to [place] this next year.     PAST MEDICAL HISTORY:   Past Medical History:   Diagnosis Date     BCC (basal cell carcinoma of skin)      Breast cancer (H) 2015    left, invasive lobular     GERD (gastroesophageal reflux disease)      HTN (hypertension) 11/02/2022     Other malignant neoplasm of skin, site unspecified      Tinnitus      Tubular adenoma of colon      Unsatisfactory cervical cytology smear 12/04/2018 12/04/18: See problem list.      Vaginal atrophy      Variants of migraine, not elsewhere classified, without mention of intractable migraine without mention of status migrainosus     menstrual migraines       PAST SURGICAL HISTORY:  Past Surgical History:   Procedure Laterality Date     BIOPSY NODE SENTINEL Left 12/21/2015    Procedure: BIOPSY NODE SENTINEL;  Surgeon: Francisco Gomez MD;  Location: [facility] OR     COLONOSCOPY  01/01/2006     EYE SURGERY  03/2020    Cataract right eye removed     HEMORRHOIDECTOMY  05/01/2006     MASTECTOMY, RECONSTRUCT BREAST, COMBINED Left 12/21/2015    Procedure: COMBINED MASTECTOMY, RECONSTRUCT BREAST;  Surgeon: Francisco Gomez MD;  Location: [facility] OR     MASTOPEXY Right 02/15/2016    Procedure: MASTOPEXY;  Surgeon: RADHA Friedman MD;  Location: [facility] OR     RECONSTRUCT BREAST Left 12/21/2015    Procedure: RECONSTRUCT BREAST;  Surgeon: Francisco Gomez MD;  Location: [facility]     REMOVE AND REPLACE BREAST IMPLANT PROSTHESIS Left  02/15/2016    Procedure: REMOVE AND REPLACE BREAST IMPLANT PROSTHESIS;  Surgeon: RADHA Friedman MD;  Location: [facility] OR       MEDS:  Current Outpatient Medications   Medication Sig Dispense Refill     acyclovir (ZOVIRAX) 800 MG tablet as needed Only As needed for Cold Sores       calcium-vitamin D 500-125 MG-UNIT TABS Take 1 tablet by mouth every evening        cetirizine (ZYRTEC) 10 MG tablet Take 10 mg by mouth every evening        letrozole (FEMARA) 2.5 MG tablet TAKE 1 TABLET BY MOUTH EVERY DAY 90 tablet 3     losartan (COZAAR) 25 MG tablet Take 1 tablet (25 mg) by mouth daily 90 tablet 3     lovastatin (MEVACOR) 20 MG tablet Take 1 tablet (20 mg) by mouth at bedtime for 360 days 90 tablet 3     No current facility-administered medications for this visit.       ALLERGIES:  Allergies   Allergen Reactions     Bactrim [Sulfamethoxazole-Trimethoprim]      Facial redness     Codeine      Other reaction(s): Dizziness  Dizziness and nausea     Vicodin [Hydrocodone-Acetaminophen] Nausea        PHYSICAL EXAM:  BP (!) 143/85   Pulse 73   Temp 98.1  F (36.7  C)   Resp 16   Wt 70.3 kg (155 lb)   LMP 01/25/2007   SpO2 98%   BMI 27.90 kg/m    General:  Well appearing adult female in NAD.  Alert and oriented.  HEENT:  Normocephalic.  Sclera anicteric.    Lymph:  No palpable cervical, supraclavicular, or axillary LAD.  Chest:  CTA bilaterally.  No wheezes or crackles.  CV:  RRR.  Nl S1 and S2.  No audible m/r/g.  Breast:  Right breast mastopexy.  Left breast mastectomy with implant reconstruction.  There is significant left axillary, extending from the left upper outer lumpectomy scar to the shoulder.  There are no discretely palpable masses in the right breast or overlying the left reconstructed breast.   Abd:  Soft/ND.   Ext:  No pitting edema of the bilateral lower extremities.    Musculo:  Full ROM of the bilateral upper extremities.  Neuro:  Cranial nerves grossly intact.  Gait is stable.  Psych:  Mood and  affect appear normal.    LABS/IMAGING REVIEWED THIS VISIT:    12/23/2024 Right breast screening mammogram:  I personally reviewed the images in clinic today.  There are no new or concerning findings.  Specifically no concerning masses or areas or architectural distortion.    12/23/2024 DEXA bone density scan:  Lowest T-score = -0.6    IMPRESSION/PLAN: 70 yo female with a h/o T3N0M0, grade I, ER positive, AL positive, HER2 negative invasive lobular carcinoma of the left breast. She is s/p treatment with left breast mastectomy and has been on letrozole since 01/13/2016.    1.  Left breast estrogen receptor-positive cancer:  Ms. Vegas is 9 years out from excision of a left breast cancer.  She continues on adjuvant curative intent treatment with letrozole.  She is tolerating the medication well.  Plan is to continue letrozole to complete a 10 year course, through 01/2026.      She is asymptomatic of disease recurrence on history taken today.     - I personally reviewed the images of right breast screening mammogram performed today which are without new or concerning findings when compared to prior.  - Will schedule right breast mammogram and return visit in 1 year.    2.  Hypertension:  No change since last visit.  White coat hypertension is normal for her.  She is taking losartan 25 mg PO daily.  Ongoing follow up with PCP.      3.  At risk for osteoporosis:  She is at increased risk of osteoporosis on letrozole. DEXA bone density scan in 12/2022 with a lowest T-score of -0.7 which is within normal range.  Continue calcium 1200 mg and vitamin D 1000 IU daily as well as a half hour of daily weight bearing activity.    - I personally reviewed the DEXA performed today which shows a lowest T-score of -0.6, which is overall stable from prior.    4.  Followup:    Right breast screening mammogram and visit with me in 1 year.    Patient seen and discussed with Dr Rojas, please see attestation  Bill Bianchi DO    Hematology/Oncology/BMT Fellow PGY5  Pager: [phone]    Patient was seen and discussed with medical oncology fellow, Dr. Bianchi.  The oncology fellow was personally supervised by me during the patient examination. I personally verified the medical history, performed a physical exam and the medical decision-making. I made appropriate changes to the documentation and the assessment and plan based on my verification, exam, and medical decision making.    I spent 34 minutes on the date of the encounter doing chart review, review of test results, interpretation of tests, patient visit, and documentation       Genevieve Rojas MD      Again, thank you for allowing me to participate in the care of your patient.        Sincerely,        Genevieve Rojas MD

## 2024-12-23 NOTE — PROGRESS NOTES
ONCOLOGY VISIT:    NAME: Esther Vegas       DATE: 12/23/2024    PRIMARY CARE PHYSICIAN: Yana Diaz    PATIENT ID: Grade I, stage IIb, T3N0M0, ER positive, CT positive, HER2 non-amplified invasive lobular carcinoma of the left breast. Oncotype DX recurrence score = 11.     Oncology History: Ms. Vegas is a 69 year old female with a h/o invasive lobular cancer of the left breast. Routine screening mammogram on 10/23/15 demonstrated a spiculated lesion in the upper outer left breast. Ultrasound showed a 0.6 cm mass with indistinct margins at the 1:30 position, 3 cm from the nipple and a second 0.6 cm mass at the 1:00 position, 5 cm from the nipple. Contrast enhanced mammogram then showed non-masslike clumped enhancement in a segmental distribution from the 1 to 3:00 position of the left breast measuring 7.5 cm. Biopsy of the mass at the 1:00 position showed a grade 1 invasive lobular carcinoma and classical type LCIS. Estrogen receptor staining was strongly positive in >95% of tumor cell nuclei. Progesterone receptor staining was moderately positive in 60% of tumor cell nuclei. HER2 was nonamplified by FISH with a HER2 to SAVAGE-17 ratio of 1.2.     Left axillary sentinel lymph node biopsy and left nipple-sparing mastectomy with immediate reconstruction was performed on 12/21/15. Pathology showed invasive lobular carcinoma, [place] grade 2, involving the upper outer quadrant, central breast and lower inner quadrant, size 5.4 cm. There was LCIS, classical type. Surgical margins were negative. A single sentinel LN was negative. [id number]. Oncotype dx recurrence score was 11.  Adjuvant postmastectomy chest wall radiation was not recommended based on lack of high risk features.  She has been on adjuvant letrozole since 1/13/16.    Interim History:   Ms. Vegas presents for routine follow up of her breast cancer. She has been taking letrozole as prescribed.  She denies breast lumps, pain or swelling. She  denies abdominal pain, fevers, night sweats, dyspnea, cough, new bone pains, hot flashes, night sweats or other new symptoms. She continues to take losartan.   She continues to exercise, walks 4 miles a day, 5 days a week.  She and her spouse took a trip to [place] this year.  They are thinking they may go to [place] this next year.     PAST MEDICAL HISTORY:   Past Medical History:   Diagnosis Date    BCC (basal cell carcinoma of skin)     Breast cancer (H) 2015    left, invasive lobular    GERD (gastroesophageal reflux disease)     HTN (hypertension) 11/02/2022    Other malignant neoplasm of skin, site unspecified     Tinnitus     Tubular adenoma of colon     Unsatisfactory cervical cytology smear 12/04/2018 12/04/18: See problem list.     Vaginal atrophy     Variants of migraine, not elsewhere classified, without mention of intractable migraine without mention of status migrainosus     menstrual migraines       PAST SURGICAL HISTORY:  Past Surgical History:   Procedure Laterality Date    BIOPSY NODE SENTINEL Left 12/21/2015    Procedure: BIOPSY NODE SENTINEL;  Surgeon: Francisco Gomez MD;  Location: [facility]    COLONOSCOPY  01/01/2006    EYE SURGERY  03/2020    Cataract right eye removed    HEMORRHOIDECTOMY  05/01/2006    MASTECTOMY, RECONSTRUCT BREAST, COMBINED Left 12/21/2015    Procedure: COMBINED MASTECTOMY, RECONSTRUCT BREAST;  Surgeon: Francisco Gomez MD;  Location: [facility]    MASTOPEXY Right 02/15/2016    Procedure: MASTOPEXY;  Surgeon: RADHA Friedman MD;  Location: [facility]    RECONSTRUCT BREAST Left 12/21/2015    Procedure: RECONSTRUCT BREAST;  Surgeon: Francisco Gomez MD;  Location: [facility]    REMOVE AND REPLACE BREAST IMPLANT PROSTHESIS Left 02/15/2016    Procedure: REMOVE AND REPLACE BREAST IMPLANT PROSTHESIS;  Surgeon: RADHA Friedman MD;  Location: [facility] OR       MEDS:  Current Outpatient Medications   Medication Sig Dispense Refill    acyclovir (ZOVIRAX) 800 MG tablet as needed Only As needed  for Cold Sores      calcium-vitamin D 500-125 MG-UNIT TABS Take 1 tablet by mouth every evening       cetirizine (ZYRTEC) 10 MG tablet Take 10 mg by mouth every evening       letrozole (FEMARA) 2.5 MG tablet TAKE 1 TABLET BY MOUTH EVERY DAY 90 tablet 3    losartan (COZAAR) 25 MG tablet Take 1 tablet (25 mg) by mouth daily 90 tablet 3    lovastatin (MEVACOR) 20 MG tablet Take 1 tablet (20 mg) by mouth at bedtime for 360 days 90 tablet 3     No current facility-administered medications for this visit.       ALLERGIES:  Allergies   Allergen Reactions    Bactrim [Sulfamethoxazole-Trimethoprim]      Facial redness    Codeine      Other reaction(s): Dizziness  Dizziness and nausea    Vicodin [Hydrocodone-Acetaminophen] Nausea        PHYSICAL EXAM:  BP (!) 143/85   Pulse 73   Temp 98.1  F (36.7  C)   Resp 16   Wt 70.3 kg (155 lb)   LMP 01/25/2007   SpO2 98%   BMI 27.90 kg/m    General:  Well appearing adult female in NAD.  Alert and oriented.  HEENT:  Normocephalic.  Sclera anicteric.    Lymph:  No palpable cervical, supraclavicular, or axillary LAD.  Chest:  CTA bilaterally.  No wheezes or crackles.  CV:  RRR.  Nl S1 and S2.  No audible m/r/g.  Breast:  Right breast mastopexy.  Left breast mastectomy with implant reconstruction.  There is significant left axillary, extending from the left upper outer lumpectomy scar to the shoulder.  There are no discretely palpable masses in the right breast or overlying the left reconstructed breast.   Abd:  Soft/ND.   Ext:  No pitting edema of the bilateral lower extremities.    Musculo:  Full ROM of the bilateral upper extremities.  Neuro:  Cranial nerves grossly intact.  Gait is stable.  Psych:  Mood and affect appear normal.    LABS/IMAGING REVIEWED THIS VISIT:    12/23/2024 Right breast screening mammogram:  I personally reviewed the images in clinic today.  There are no new or concerning findings.  Specifically no concerning masses or areas or architectural  distortion.    12/23/2024 DEXA bone density scan:  Lowest T-score = -0.6    IMPRESSION/PLAN: 70 yo female with a h/o T3N0M0, grade I, ER positive, OR positive, HER2 negative invasive lobular carcinoma of the left breast. She is s/p treatment with left breast mastectomy and has been on letrozole since 01/13/2016.    1.  Left breast estrogen receptor-positive cancer:  Ms. Vegas is 9 years out from excision of a left breast cancer.  She continues on adjuvant curative intent treatment with letrozole.  She is tolerating the medication well.  Plan is to continue letrozole to complete a 10 year course, through 01/2026.      She is asymptomatic of disease recurrence on history taken today.     - I personally reviewed the images of right breast screening mammogram performed today which are without new or concerning findings when compared to prior.  - Will schedule right breast mammogram and return visit in 1 year.    2.  Hypertension:  No change since last visit.  White coat hypertension is normal for her.  She is taking losartan 25 mg PO daily.  Ongoing follow up with PCP.      3.  At risk for osteoporosis:  She is at increased risk of osteoporosis on letrozole. DEXA bone density scan in 12/2022 with a lowest T-score of -0.7 which is within normal range.  Continue calcium 1200 mg and vitamin D 1000 IU daily as well as a half hour of daily weight bearing activity.    - I personally reviewed the DEXA performed today which shows a lowest T-score of -0.6, which is overall stable from prior.    4.  Followup:    Right breast screening mammogram and visit with me in 1 year.    Patient seen and discussed with Dr Rojas, please see attestation  Bill Bianchi DO   Hematology/Oncology/BMT Fellow PGY5  Pager: [phone]    Patient was seen and discussed with medical oncology fellow, Dr. Bianchi.  The oncology fellow was personally supervised by me during the patient examination. I personally verified the medical history,  performed a physical exam and the medical decision-making. I made appropriate changes to the documentation and the assessment and plan based on my verification, exam, and medical decision making.    I spent 34 minutes on the date of the encounter doing chart review, review of test results, interpretation of tests, patient visit, and documentation       Genevieve Rojas MD

## 2025-02-06 DIAGNOSIS — E78.2 MIXED HYPERLIPIDEMIA: ICD-10-CM

## 2025-02-06 DIAGNOSIS — I10 HYPERTENSION, UNSPECIFIED TYPE: ICD-10-CM

## 2025-02-06 RX ORDER — LOSARTAN POTASSIUM 25 MG/1
25 TABLET ORAL DAILY
Qty: 90 TABLET | Refills: 3 | Status: SHIPPED | OUTPATIENT
Start: 2025-02-06

## 2025-02-06 RX ORDER — LOVASTATIN 20 MG/1
20 TABLET ORAL AT BEDTIME
Qty: 90 TABLET | Refills: 3 | Status: SHIPPED | OUTPATIENT
Start: 2025-02-06

## 2025-02-25 ENCOUNTER — TELEPHONE (OUTPATIENT)
Dept: FAMILY MEDICINE | Facility: CLINIC | Age: 70
End: 2025-02-25
Payer: COMMERCIAL

## 2025-02-25 NOTE — TELEPHONE ENCOUNTER
Patient Quality Outreach    Patient is due for the following:   Hypertension -  Hypertension follow-up visit  Physical Annual Wellness Visit  There are no preventive care reminders to display for this patient.    Action(s) Taken:   Patient has upcoming appointment, these items will be addressed at that time.    Type of outreach:    Chart review performed, no outreach needed.    Questions for provider review:    None           Sarah Oropeza

## 2025-03-04 SDOH — HEALTH STABILITY: PHYSICAL HEALTH: ON AVERAGE, HOW MANY DAYS PER WEEK DO YOU ENGAGE IN MODERATE TO STRENUOUS EXERCISE (LIKE A BRISK WALK)?: 5 DAYS

## 2025-03-04 ASSESSMENT — SOCIAL DETERMINANTS OF HEALTH (SDOH): HOW OFTEN DO YOU GET TOGETHER WITH FRIENDS OR RELATIVES?: TWICE A WEEK

## 2025-03-05 ENCOUNTER — OFFICE VISIT (OUTPATIENT)
Dept: FAMILY MEDICINE | Facility: CLINIC | Age: 70
End: 2025-03-05
Attending: FAMILY MEDICINE
Payer: COMMERCIAL

## 2025-03-05 VITALS
WEIGHT: 155.5 LBS | HEART RATE: 71 BPM | HEIGHT: 63 IN | TEMPERATURE: 98 F | OXYGEN SATURATION: 97 % | SYSTOLIC BLOOD PRESSURE: 145 MMHG | RESPIRATION RATE: 16 BRPM | DIASTOLIC BLOOD PRESSURE: 85 MMHG | BODY MASS INDEX: 27.55 KG/M2

## 2025-03-05 DIAGNOSIS — D12.5 BENIGN NEOPLASM OF SIGMOID COLON: ICD-10-CM

## 2025-03-05 DIAGNOSIS — Z29.11 NEED FOR VACCINATION AGAINST RESPIRATORY SYNCYTIAL VIRUS: ICD-10-CM

## 2025-03-05 DIAGNOSIS — Z00.00 HEALTH CARE MAINTENANCE: ICD-10-CM

## 2025-03-05 DIAGNOSIS — C50.912 MALIGNANT NEOPLASM OF LEFT FEMALE BREAST, UNSPECIFIED ESTROGEN RECEPTOR STATUS, UNSPECIFIED SITE OF BREAST (H): ICD-10-CM

## 2025-03-05 DIAGNOSIS — E78.2 MIXED HYPERLIPIDEMIA: ICD-10-CM

## 2025-03-05 DIAGNOSIS — M25.512 CHRONIC LEFT SHOULDER PAIN: ICD-10-CM

## 2025-03-05 DIAGNOSIS — Z00.00 ENCOUNTER FOR MEDICARE ANNUAL WELLNESS EXAM: Primary | ICD-10-CM

## 2025-03-05 DIAGNOSIS — Z51.81 ENCOUNTER FOR THERAPEUTIC DRUG MONITORING: ICD-10-CM

## 2025-03-05 DIAGNOSIS — C44.90 MALIGNANT NEOPLASM OF SKIN: ICD-10-CM

## 2025-03-05 DIAGNOSIS — G89.29 CHRONIC LEFT SHOULDER PAIN: ICD-10-CM

## 2025-03-05 DIAGNOSIS — E66.3 OVERWEIGHT WITH BODY MASS INDEX (BMI) OF 27 TO 27.9 IN ADULT: ICD-10-CM

## 2025-03-05 DIAGNOSIS — I10 HYPERTENSION, UNSPECIFIED TYPE: ICD-10-CM

## 2025-03-05 DIAGNOSIS — Z23 IMMUNIZATION DUE: ICD-10-CM

## 2025-03-05 LAB
ALBUMIN SERPL BCG-MCNC: 4.3 G/DL (ref 3.5–5.2)
ALP SERPL-CCNC: 86 U/L (ref 40–150)
ALT SERPL W P-5'-P-CCNC: 30 U/L (ref 0–50)
ANION GAP SERPL CALCULATED.3IONS-SCNC: 10 MMOL/L (ref 7–15)
AST SERPL W P-5'-P-CCNC: 32 U/L (ref 0–45)
BILIRUB SERPL-MCNC: 0.5 MG/DL
BUN SERPL-MCNC: 19.8 MG/DL (ref 8–23)
CALCIUM SERPL-MCNC: 10 MG/DL (ref 8.8–10.4)
CHLORIDE SERPL-SCNC: 105 MMOL/L (ref 98–107)
CHOLEST SERPL-MCNC: 263 MG/DL
CREAT SERPL-MCNC: 0.73 MG/DL (ref 0.51–0.95)
EGFRCR SERPLBLD CKD-EPI 2021: 88 ML/MIN/1.73M2
ERYTHROCYTE [DISTWIDTH] IN BLOOD BY AUTOMATED COUNT: 12.9 % (ref 10–15)
FASTING STATUS PATIENT QL REPORTED: YES
FASTING STATUS PATIENT QL REPORTED: YES
GLUCOSE SERPL-MCNC: 99 MG/DL (ref 70–99)
HCO3 SERPL-SCNC: 27 MMOL/L (ref 22–29)
HCT VFR BLD AUTO: 43.4 % (ref 35–47)
HDLC SERPL-MCNC: 72 MG/DL
HGB BLD-MCNC: 14.4 G/DL (ref 11.7–15.7)
LDLC SERPL CALC-MCNC: 161 MG/DL
MCH RBC QN AUTO: 29.9 PG (ref 26.5–33)
MCHC RBC AUTO-ENTMCNC: 33.2 G/DL (ref 31.5–36.5)
MCV RBC AUTO: 90 FL (ref 78–100)
NONHDLC SERPL-MCNC: 191 MG/DL
PLATELET # BLD AUTO: 335 10E3/UL (ref 150–450)
POTASSIUM SERPL-SCNC: 4.6 MMOL/L (ref 3.4–5.3)
PROT SERPL-MCNC: 7.4 G/DL (ref 6.4–8.3)
RBC # BLD AUTO: 4.81 10E6/UL (ref 3.8–5.2)
SODIUM SERPL-SCNC: 142 MMOL/L (ref 135–145)
TRIGL SERPL-MCNC: 149 MG/DL
WBC # BLD AUTO: 6.9 10E3/UL (ref 4–11)

## 2025-03-05 PROCEDURE — 80061 LIPID PANEL: CPT | Performed by: FAMILY MEDICINE

## 2025-03-05 PROCEDURE — 80053 COMPREHEN METABOLIC PANEL: CPT | Performed by: FAMILY MEDICINE

## 2025-03-05 PROCEDURE — 36415 COLL VENOUS BLD VENIPUNCTURE: CPT | Performed by: FAMILY MEDICINE

## 2025-03-05 PROCEDURE — 85027 COMPLETE CBC AUTOMATED: CPT | Performed by: FAMILY MEDICINE

## 2025-03-05 RX ORDER — LOSARTAN POTASSIUM 50 MG/1
50 TABLET ORAL DAILY
Qty: 30 TABLET | Refills: 1 | Status: SHIPPED | OUTPATIENT
Start: 2025-03-05

## 2025-03-05 RX ORDER — LOVASTATIN 20 MG/1
20 TABLET ORAL AT BEDTIME
Qty: 90 TABLET | Refills: 3 | Status: SHIPPED | OUTPATIENT
Start: 2025-03-05

## 2025-03-05 NOTE — ASSESSMENT & PLAN NOTE
Vaccine recommended: RSV  Pap: no indication  Mammo: ordered by oncology Dr. Rojas.   Colonoscopy: colonoscopy 2023 and repeat due 7 yrs 2030   Std testing desired:  offered  Osteoporosis prevention discussed.  vitamin d levels ordered. Recommend daily calcium and vitamin d intake to keep good bone health. Recommend weight bearing exercise, no tobacco, and limit alcohol  dexa - done by oncology 128/2024  Recommend sunscreen, exercise, & healthy diet.  Offered cbc, cmp, lipids and asked what other testing she  desires today  I have had an Advance Directives discussion with the patient.   Body mass index is 27.99 kg/m .   mychart offered.

## 2025-03-05 NOTE — ASSESSMENT & PLAN NOTE
Months of left shoulder stiffness. Oncology thinks this is related to scar tissue after breast cancer treatment. Physical therapy discussed she agrees.     Orders:    Physical Therapy  Referral; Future

## 2025-03-05 NOTE — ASSESSMENT & PLAN NOTE
Hyperlipidemia-continue lovastatin 20 mg p.o. daily and monitoring labs are ordered today as well.   Orders:    lovastatin (MEVACOR) 20 MG tablet; Take 1 tablet (20 mg) by mouth at bedtime.    Lipid Profile; Future

## 2025-03-05 NOTE — ASSESSMENT & PLAN NOTE
Malignant neoplasm left breast. Saw Dr. Rojas - oncology 12/2024 and follow up is recommended in 1 year.

## 2025-03-05 NOTE — PATIENT INSTRUCTIONS
Patient Education   Preventive Care Advice   This is general advice given by our system to help you stay healthy. However, your care team may have specific advice just for you. Please talk to your care team about your preventive care needs.  Nutrition  Eat 5 or more servings of fruits and vegetables each day.  Try wheat bread, brown rice and whole grain pasta (instead of white bread, rice, and pasta).  Get enough calcium and vitamin D. Check the label on foods and aim for 100% of the RDA (recommended daily allowance).  Lifestyle  Exercise at least 150 minutes each week  (30 minutes a day, 5 days a week).  Do muscle strengthening activities 2 days a week. These help control your weight and prevent disease.  No smoking.  Wear sunscreen to prevent skin cancer.  Have a dental exam and cleaning every 6 months.  Yearly exams  See your health care team every year to talk about:  Any changes in your health.  Any medicines your care team has prescribed.  Preventive care, family planning, and ways to prevent chronic diseases.  Shots (vaccines)   HPV shots (up to age 26), if you've never had them before.  Hepatitis B shots (up to age 59), if you've never had them before.  COVID-19 shot: Get this shot when it's due.  Flu shot: Get a flu shot every year.  Tetanus shot: Get a tetanus shot every 10 years.  Pneumococcal, hepatitis A, and RSV shots: Ask your care team if you need these based on your risk.  Shingles shot (for age 50 and up)  General health tests  Diabetes screening:  Starting at age 35, Get screened for diabetes at least every 3 years.  If you are younger than age 35, ask your care team if you should be screened for diabetes.  Cholesterol test: At age 39, start having a cholesterol test every 5 years, or more often if advised.  Bone density scan (DEXA): At age 50, ask your care team if you should have this scan for osteoporosis (brittle bones).  Hepatitis C: Get tested at least once in your life.  STIs (sexually  transmitted infections)  Before age 24: Ask your care team if you should be screened for STIs.  After age 24: Get screened for STIs if you're at risk. You are at risk for STIs (including HIV) if:  You are sexually active with more than one person.  You don't use condoms every time.  You or a partner was diagnosed with a sexually transmitted infection.  If you are at risk for HIV, ask about PrEP medicine to prevent HIV.  Get tested for HIV at least once in your life, whether you are at risk for HIV or not.  Cancer screening tests  Cervical cancer screening: If you have a cervix, begin getting regular cervical cancer screening tests starting at age 21.  Breast cancer scan (mammogram): If you've ever had breasts, begin having regular mammograms starting at age 40. This is a scan to check for breast cancer.  Colon cancer screening: It is important to start screening for colon cancer at age 45.  Have a colonoscopy test every 10 years (or more often if you're at risk) Or, ask your provider about stool tests like a FIT test every year or Cologuard test every 3 years.  To learn more about your testing options, visit:   .  For help making a decision, visit:   https://bit.ly/zp12620.  Prostate cancer screening test: If you have a prostate, ask your care team if a prostate cancer screening test (PSA) at age 55 is right for you.  Lung cancer screening: If you are a current or former smoker ages 50 to 80, ask your care team if ongoing lung cancer screenings are right for you.  For informational purposes only. Not to replace the advice of your health care provider. Copyright   2023 [facility]. All rights reserved. Clinically reviewed by the [facility]itions Program. Wave - Private Location App 127933 - REV 01/24.  Learning About Activities of Daily Living  What are activities of daily living?     Activities of daily living (ADLs) are the basic self-care tasks you do every day. These include eating, bathing, dressing,  and moving around.  As you age, and if you have health problems, you may find that it's harder to do some of these tasks. If so, your doctor can suggest ideas that may help.  To measure what kind of help you may need, your doctor will ask how well you are able to do ADLs. Let your doctor know if there are any tasks that you are having trouble doing. This is an important first step to getting help. And when you have the help you need, you can stay as independent as possible.  How will a doctor assess your ADLs?  Asking about ADLs is part of a routine health checkup your doctor will likely do as you age. Your health check might be done in a doctor's office, in your home, or at a hospital. The goal is to find out if you are having any problems that could make it hard to care for yourself or that make it unsafe for you to be on your own.  To measure your ADLs, your doctor will ask how hard it is for you to do routine tasks. Your doctor may also want to know if you have changed the way you do a task because of a health problem. Your doctor may watch how you:  Walk back and forth.  Keep your balance while you stand or walk.  Move from sitting to standing or from a bed to a chair.  Button or unbutton a shirt or sweater.  Remove and put on your shoes.  It's common to feel a little worried or anxious if you find you can't do all the things you used to be able to do. Talking with your doctor about ADLs is a way to make sure you're as safe as possible and able to care for yourself as well as you can. You may want to bring a caregiver, friend, or family member to your checkup. They can help you talk to your doctor.  Follow-up care is a key part of your treatment and safety. Be sure to make and go to all appointments, and call your doctor if you are having problems. It's also a good idea to know your test results and keep a list of the medicines you take.  Current as of: October 24, 2023  Content Version: 14.3    2024 [facility]  [employer].   Care instructions adapted under license by your healthcare professional. If you have questions about a medical condition or this instruction, always ask your healthcare professional. [facility] [employer] disclaims any warranty or liability for your use of this information.

## 2025-03-05 NOTE — PROGRESS NOTES
Preventive Care Visit  [facility]  Trinity Oropeza MD, Family Medicine  Mar 5, 2025    In addition to the preventive service, I spent 23 minutes discussing the patient's lovastatin, adjusting cozaar and shoulder pain problem      Assessment & Plan  Encounter for Medicare annual wellness exam         Health care maintenance  Vaccine recommended: RSV  Pap: no indication  Mammo: ordered by oncology Dr. Rojas.   Colonoscopy: colonoscopy 2023 and repeat due 7 yrs 2030   Std testing desired:  offered  Osteoporosis prevention discussed.  vitamin d levels ordered. Recommend daily calcium and vitamin d intake to keep good bone health. Recommend weight bearing exercise, no tobacco, and limit alcohol  dexa - done by oncology 128/2024  Recommend sunscreen, exercise, & healthy diet.  Offered cbc, cmp, lipids and asked what other testing she  desires today  I have had an Advance Directives discussion with the patient.   Body mass index is 27.99 kg/m .   mychart offered.         Hypertension, unspecified type  Htn, She is noted to have elevated blood pressure today of 145/85 and at her last visit in December it was also elevated.  We discussed increasing her Cozaar from 25 mg p.o. daily to 50 mg p.o. daily and she wants to do that.    HTN  Patient is asked to monitor BP at home or work, several times per month and return with written values at next office visit. Goal bp is 120/80. If staying higher than 130/85 on three occasions you should bring the values into clinic so that we can evaluate and treat if needed.    Recommend stop alcohol, caffiene, ibuprofen, carbonated drinks, sudafed & decrease salt intake. If you smoke, it is recommended that you quit. Keep weight in normal range.    Walk daily for at least 30 minutes.      Follow up in 1 month    Orders:    losartan (COZAAR) 50 MG tablet; Take 1 tablet (50 mg) by mouth daily.    Basic metabolic panel  (Ca, Cl, CO2, Creat, Gluc, K, Na, BUN);  Future    Mixed hyperlipidemia  Hyperlipidemia-continue lovastatin 20 mg p.o. daily and monitoring labs are ordered today as well.   Orders:    lovastatin (MEVACOR) 20 MG tablet; Take 1 tablet (20 mg) by mouth at bedtime.    Lipid Profile; Future    Malignant neoplasm of left female breast, unspecified estrogen receptor status, unspecified site of breast (H)  Malignant neoplasm left breast. Saw Dr. Rojas - oncology 12/2024 and follow up is recommended in 1 year.        Chronic left shoulder pain  Months of left shoulder stiffness. Oncology thinks this is related to scar tissue after breast cancer treatment. Physical therapy discussed she agrees.     Orders:    Physical Therapy  Referral; Future    Overweight with body mass index (BMI) of 27 to 27.9 in adult  Overweight with a BMI of 27.99 today.  Healthy lifestyle is discussed.         Need for vaccination against respiratory syncytial virus         Benign neoplasm of sigmoid colon  Colonoscopy done 1/2023 and tubular adenoma noted again, follow up in 7 years. 2030          Other malignant neoplasm of skin, site unspecified  Follow-up w [facility] - she is planning on this.          Encounter for therapeutic drug monitoring    Orders:    CBC with platelets; Future    Comprehensive metabolic panel (BMP + Alb, Alk Phos, ALT, AST, Total. Bili, TP); Future    Immunization due    Orders:    RSV vaccine, bivalent, ABRYSVO, injection; Inject 0.5 mLs into the muscle once for 1 dose. Pharmacist administered          Subjective   Esther Eagle is a 69 year old, presenting for the following:  Annual Visit (Discuss elevated BP. )        3/5/2025     8:26 AM   Additional Questions   Roomed by Tian Gannon MA   Accompanied by Self         3/5/2025     8:26 AM   Patient Reported Additional Medications   Patient reports taking the following new medications None         HPI  Left shoulder pain.     Advance Care Planning  Patient does not have a Health Care Directive:  Discussed advance care planning with patient; information given to patient to review.      3/4/2025   General Health   How would you rate your overall physical health? Good   Feel stress (tense, anxious, or unable to sleep) Only a little   (!) STRESS CONCERN  - declined meds/ counseling.       3/4/2025   Nutrition   Diet: Regular (no restrictions)         3/4/2025   Exercise   Days per week of moderate/strenous exercise 5 days         3/4/2025   Social Factors   Frequency of gathering with friends or relatives Twice a week   Worry food won't last until get money to buy more No   Food not last or not have enough money for food? No   Do you have housing? (Housing is defined as stable permanent housing and does not include staying ouside in a car, in a tent, in an abandoned building, in an overnight shelter, or couch-surfing.) Yes   Are you worried about losing your housing? No   Lack of transportation? No   Unable to get utilities (heat,electricity)? No         3/4/2025   Fall Risk   Fallen 2 or more times in the past year? No    No   Trouble with walking or balance? No    No       Multiple values from one day are sorted in reverse-chronological order          3/4/2025   Activities of Daily Living- Home Safety   Needs help with the following daily activites None of the above   Safety concerns in the home No grab bars in the bathroom- discussed         3/4/2025   Dental   Dentist two times every year? Yes         3/4/2025   Hearing Screening   Hearing concerns? None of the above         3/4/2025   Driving Risk Screening   Patient/family members have concerns about driving No         3/4/2025   General Alertness/Fatigue Screening   Have you been more tired than usual lately? No         3/4/2025   Urinary Incontinence Screening   Bothered by leaking urine in past 6 months No         2/18/2024   TB Screening   Were you born outside of the [place]? No         Today's PHQ-2 Score:       3/4/2025     9:56 AM   PHQ-2 ( 1999  [employer])   Q1: Little interest or pleasure in doing things 0   Q2: Feeling down, depressed or hopeless 0   PHQ-2 Score 0    Q1: Little interest or pleasure in doing things Not at all   Q2: Feeling down, depressed or hopeless Not at all   PHQ-2 Score 0       Patient-reported           3/4/2025   Substance Use   Alcohol more than 3/day or more than 7/wk No   Do you have a current opioid prescription? No   How severe/bad is pain from 1 to 10? 0/10 (No Pain)   Do you use any other substances recreationally? No     Social History     Tobacco Use    Smoking status: Never    Smokeless tobacco: Never   Vaping Use    Vaping status: Never Used   Substance Use Topics    Alcohol use: Yes     Alcohol/week: 2.0 - 3.0 standard drinks of alcohol     Types: 2 - 3 Standard drinks or equivalent per week     Comment: 5 times weekly    Drug use: No           12/23/2024   LAST FHS-7 RESULTS   1st degree relative breast or ovarian cancer Yes   Any relative bilateral breast cancer No   Any male have breast cancer No   Any ONE woman have BOTH breast AND ovarian cancer No   Any woman with breast cancer before 50yrs No   2 or more relatives with breast AND/OR ovarian cancer Yes   2 or more relatives with breast AND/OR bowel cancer Yes       Hx breast cancer - mammograms per oncology       History of abnormal Pap smear: No - age 65 or older with adequate negative prior screening test results (3 consecutive negative cytology results, 2 consecutive negative cotesting results, or 2 consecutive negative HrHPV test results within 10 years, with the most recent test occurring within the recommended screening interval for the test used)        Latest Ref Rng & Units 6/26/2019     3:13 PM 12/4/2018     5:49 PM 12/4/2018     5:46 PM   PAP / HPV   PAP (Historical)  NIL   UNSAT    HPV 16 DNA NEG^Negative  Negative     HPV 18 DNA NEG^Negative  Negative     Other HR HPV NEG^Negative  Negative       ASCVD Risk   The 10-year ASCVD risk score (Lisseth  KOBY, et al., 2019) is: 17.5%    Values used to calculate the score:      Age: 69 years      Sex: Female      Is Non- : No      Diabetic: No      Tobacco smoker: No      Systolic Blood Pressure: 145 mmHg      Is BP treated: Yes      HDL Cholesterol: 61 mg/dL      Total Cholesterol: 301 mg/dL      Reviewed and updated as needed this visit by Provider   Tobacco  Allergies  Meds  Problems  Med Hx  Surg Hx  Fam Hx              Current providers sharing in care for this patient include:  Patient Care Team:  Trinity Oropeza MD as PCP - General (Family Medicine)  Yana Diaz MD as MD (OB/Gyn)  Genevieve Rojas MD as MD (Internal Medicine)  RADHA Friedman MD as MD (Plastic Surgery)  Francisco Gomez MD as MD (General Surgery)  Genevieve Rojas MD as Assigned Cancer Care Provider  Shagufta Emery MD as MD (Dermatology)  Trinity Oropeza MD as Assigned PCP  Faith Dasilva, RN as Specialty Care Coordinator (Hematology & Oncology)    The following health maintenance items are reviewed in Epic and correct as of today:  Health Maintenance   Topic Date Due    RSV VACCINE (1 - Risk 60-74 years 1-dose series) Never done    BMP  02/21/2025    LIPID  02/21/2025    COVID-19 Vaccine (8 - 2024-25 season) 04/07/2025    MAMMO SCREENING  12/23/2025    MEDICARE ANNUAL WELLNESS VISIT  03/05/2026    ANNUAL REVIEW OF HM ORDERS  03/05/2026    FALL RISK ASSESSMENT  03/05/2026    GLUCOSE  02/21/2027    DTAP/TDAP/TD IMMUNIZATION (3 - Td or Tdap) 12/04/2027    COLORECTAL CANCER SCREENING  01/26/2030    ADVANCE CARE PLANNING  03/05/2030    DEXA  12/23/2039    HEPATITIS C SCREENING  Completed    PHQ-2 (once per calendar year)  Completed    INFLUENZA VACCINE  Completed    Pneumococcal Vaccine: 50+ Years  Completed    ZOSTER IMMUNIZATION  Completed    HPV IMMUNIZATION  Aged Out    MENINGITIS IMMUNIZATION  Aged Out            Objective    Exam  BP (!) 145/85 (BP Location: Left arm,  "Patient Position: Sitting, Cuff Size: Adult Regular)   Pulse 71   Temp 98  F (36.7  C) (Oral)   Resp 16   Ht 1.588 m (5' 2.5\")   Wt 70.5 kg (155 lb 8 oz)   LMP 01/25/2007   SpO2 97%   BMI 27.99 kg/m     Estimated body mass index is 27.99 kg/m  as calculated from the following:    Height as of this encounter: 1.588 m (5' 2.5\").    Weight as of this encounter: 70.5 kg (155 lb 8 oz).    Physical Exam  Constitutional:       Appearance: Normal appearance.   HENT:      Head: Normocephalic and atraumatic.      Right Ear: Tympanic membrane, ear canal and external ear normal.      Left Ear: Tympanic membrane, ear canal and external ear normal.      Nose: Nose normal.      Mouth/Throat:      Mouth: Mucous membranes are dry.      Pharynx: Oropharynx is clear.   Eyes:      Extraocular Movements: Extraocular movements intact.      Conjunctiva/sclera: Conjunctivae normal.      Pupils: Pupils are equal, round, and reactive to light.   Cardiovascular:      Rate and Rhythm: Normal rate and regular rhythm.      Heart sounds: Normal heart sounds.   Pulmonary:      Effort: Pulmonary effort is normal.      Breath sounds: Normal breath sounds.   Abdominal:      General: Bowel sounds are normal.      Palpations: Abdomen is soft.   Musculoskeletal:         General: Normal range of motion.      Cervical back: Normal range of motion and neck supple.   Skin:     General: Skin is warm and dry.      Capillary Refill: Capillary refill takes less than 2 seconds.   Neurological:      General: No focal deficit present.      Mental Status: She is alert and oriented to person, place, and time.   Psychiatric:         Mood and Affect: Mood normal.         Behavior: Behavior normal.         Thought Content: Thought content normal.         Judgment: Judgment normal.               3/5/2025   Mini Cog   Clock Draw Score 2 Normal   3 Item Recall 3 objects recalled   Mini Cog Total Score 5            Signed Electronically by: Trinity Oropeza MD    "

## 2025-03-05 NOTE — ASSESSMENT & PLAN NOTE
Htn, She is noted to have elevated blood pressure today of 145/85 and at her last visit in December it was also elevated.  We discussed increasing her Cozaar from 25 mg p.o. daily to 50 mg p.o. daily and she wants to do that.    HTN  Patient is asked to monitor BP at home or work, several times per month and return with written values at next office visit. Goal bp is 120/80. If staying higher than 130/85 on three occasions you should bring the values into clinic so that we can evaluate and treat if needed.    Recommend stop alcohol, caffiene, ibuprofen, carbonated drinks, sudafed & decrease salt intake. If you smoke, it is recommended that you quit. Keep weight in normal range.    Walk daily for at least 30 minutes.      Follow up in 1 month    Orders:    losartan (COZAAR) 50 MG tablet; Take 1 tablet (50 mg) by mouth daily.    Basic metabolic panel  (Ca, Cl, CO2, Creat, Gluc, K, Na, BUN); Future

## 2025-03-05 NOTE — ASSESSMENT & PLAN NOTE
Hyperlipidemia-continue lovastatin 20 mg p.o. daily and monitoring labs are ordered today as well.

## 2025-03-10 ENCOUNTER — PATIENT OUTREACH (OUTPATIENT)
Dept: CARE COORDINATION | Facility: CLINIC | Age: 70
End: 2025-03-10
Payer: COMMERCIAL

## 2025-03-25 ENCOUNTER — TELEPHONE (OUTPATIENT)
Dept: FAMILY MEDICINE | Facility: CLINIC | Age: 70
End: 2025-03-25
Payer: COMMERCIAL

## 2025-03-25 NOTE — TELEPHONE ENCOUNTER
Patient Quality Outreach    Patient is due for the following:   Hypertension -  BP check    Action(s) Taken:   Patient has upcoming appointment, these items will be addressed at that time.    Type of outreach:    Chart review performed, no outreach needed.    Questions for provider review:    None         Kelly Tirado  Chart routed to Care Team.

## 2025-05-13 ENCOUNTER — RESULTS FOLLOW-UP (OUTPATIENT)
Dept: FAMILY MEDICINE | Facility: CLINIC | Age: 70
End: 2025-05-13

## 2025-05-27 ENCOUNTER — TRANSFERRED RECORDS (OUTPATIENT)
Dept: HEALTH INFORMATION MANAGEMENT | Facility: CLINIC | Age: 70
End: 2025-05-27
Payer: COMMERCIAL

## 2025-07-20 DIAGNOSIS — Z17.0 MALIGNANT NEOPLASM OF OVERLAPPING SITES OF LEFT BREAST IN FEMALE, ESTROGEN RECEPTOR POSITIVE (H): ICD-10-CM

## 2025-07-20 DIAGNOSIS — C50.812 MALIGNANT NEOPLASM OF OVERLAPPING SITES OF LEFT BREAST IN FEMALE, ESTROGEN RECEPTOR POSITIVE (H): ICD-10-CM

## 2025-07-22 RX ORDER — LETROZOLE 2.5 MG/1
1 TABLET, FILM COATED ORAL DAILY
Qty: 90 TABLET | Refills: 3 | Status: SHIPPED | OUTPATIENT
Start: 2025-07-22

## 2025-07-22 NOTE — TELEPHONE ENCOUNTER
Letrozole Refill   Last prescribing provider: Dr Rojas     Last clinic visit date: 12/23/24 Dr Rojas     Recommendations for requested medication (if none, N/A): Copied from chart note   1. Left breast estrogen receptor-positive cancer: Ms. Vegas is 9 years out from excision of a left breast cancer. She continues on adjuvant curative intent treatment with letrozole. She is tolerating the medication well. Plan is to continue letrozole to complete a 10 year course, through 01/2026.     Any other pertinent information (if none, N/A): N/A    Refilled: Y/N, if NO, why?

## 2025-07-31 ENCOUNTER — PATIENT OUTREACH (OUTPATIENT)
Dept: ONCOLOGY | Facility: CLINIC | Age: 70
End: 2025-07-31
Payer: COMMERCIAL

## 2025-07-31 NOTE — PROGRESS NOTES
[facility]: Cancer Care                                                                                         Completed chart audit to assign Oncology Care Coordination enrollment status.      Agata Dasilva MSN, RN, OCN   RN Care Coordinator   [facility]

## (undated) RX ORDER — GLYCOPYRROLATE 0.2 MG/ML
INJECTION, SOLUTION INTRAMUSCULAR; INTRAVENOUS
Status: DISPENSED
Start: 2017-11-10

## (undated) RX ORDER — LIDOCAINE HYDROCHLORIDE 10 MG/ML
INJECTION, SOLUTION EPIDURAL; INFILTRATION; INTRACAUDAL; PERINEURAL
Status: DISPENSED
Start: 2017-11-10